# Patient Record
Sex: FEMALE | Race: BLACK OR AFRICAN AMERICAN | NOT HISPANIC OR LATINO | Employment: FULL TIME | URBAN - METROPOLITAN AREA
[De-identification: names, ages, dates, MRNs, and addresses within clinical notes are randomized per-mention and may not be internally consistent; named-entity substitution may affect disease eponyms.]

---

## 2017-01-13 ENCOUNTER — ALLSCRIPTS OFFICE VISIT (OUTPATIENT)
Dept: OTHER | Facility: OTHER | Age: 22
End: 2017-01-13

## 2017-01-17 ENCOUNTER — GENERIC CONVERSION - ENCOUNTER (OUTPATIENT)
Dept: OTHER | Facility: OTHER | Age: 22
End: 2017-01-17

## 2017-01-20 ENCOUNTER — HOSPITAL ENCOUNTER (EMERGENCY)
Facility: HOSPITAL | Age: 22
Discharge: HOME | End: 2017-01-21
Attending: EMERGENCY MEDICINE

## 2017-01-20 VITALS
HEART RATE: 74 BPM | DIASTOLIC BLOOD PRESSURE: 58 MMHG | BODY MASS INDEX: 21.35 KG/M2 | TEMPERATURE: 98.8 F | OXYGEN SATURATION: 100 % | WEIGHT: 136 LBS | HEIGHT: 67 IN | SYSTOLIC BLOOD PRESSURE: 118 MMHG | RESPIRATION RATE: 16 BRPM

## 2017-01-20 DIAGNOSIS — R10.2 VAGINAL PAIN: Primary | ICD-10-CM

## 2017-01-20 LAB
BILIRUB UR QL STRIP: NEGATIVE
CLARITY UR: CLEAR
COLOR UR: YELLOW
COLOR, POC: YELLOW
GLUCOSE UR STRIP-MCNC: NEGATIVE MG/DL
HCG UR QL: NEGATIVE
HGB UR QL STRIP.AUTO: NEGATIVE
KETONES UR STRIP-MCNC: NEGATIVE MG/DL
LEUKOCYTE ESTERASE UR QL STRIP: NEGATIVE
NITRITE UR QL STRIP: NEGATIVE
PH UR STRIP.AUTO: 6 [PH] (ref 4.5–8)
PROT UR STRIP-MCNC: NEGATIVE MG/DL
SP GR UR STRIP.AUTO: >=1.03 (ref 1–1.03)
UROBILINOGEN UR QL STRIP.AUTO: 1 E.U./DL

## 2017-01-20 PROCEDURE — 81025 URINE PREGNANCY TEST: CPT | Performed by: EMERGENCY MEDICINE

## 2017-01-20 PROCEDURE — 81003 URINALYSIS AUTO W/O SCOPE: CPT

## 2017-01-20 PROCEDURE — 81002 URINALYSIS NONAUTO W/O SCOPE: CPT | Performed by: EMERGENCY MEDICINE

## 2017-01-20 RX ORDER — ACYCLOVIR 200 MG/1
400 CAPSULE ORAL 3 TIMES DAILY
Qty: 60 CAPSULE | Refills: 0 | Status: SHIPPED | OUTPATIENT
Start: 2017-01-20 | End: 2018-08-27 | Stop reason: HOSPADM

## 2017-01-20 RX ORDER — LIDOCAINE 40 MG/G
CREAM TOPICAL ONCE
Status: COMPLETED | OUTPATIENT
Start: 2017-01-20 | End: 2017-01-20

## 2017-01-20 RX ORDER — FERROUS SULFATE 325(65) MG
325 TABLET ORAL
COMMUNITY
End: 2021-01-20 | Stop reason: ALTCHOICE

## 2017-01-20 RX ADMIN — LIDOCAINE 1 APPLICATION: 40 CREAM TOPICAL at 21:30

## 2017-01-21 PROCEDURE — 99284 EMERGENCY DEPT VISIT MOD MDM: CPT

## 2017-01-21 RX ORDER — NAPROXEN 500 MG/1
500 TABLET ORAL 2 TIMES DAILY WITH MEALS
Qty: 14 TABLET | Refills: 0 | Status: SHIPPED | OUTPATIENT
Start: 2017-01-21 | End: 2018-08-27 | Stop reason: HOSPADM

## 2017-01-21 RX ORDER — LIDOCAINE 40 MG/G
1 CREAM TOPICAL 2 TIMES DAILY
Qty: 30 G | Refills: 0 | Status: SHIPPED | OUTPATIENT
Start: 2017-01-21 | End: 2018-08-27 | Stop reason: HOSPADM

## 2017-03-08 ENCOUNTER — ALLSCRIPTS OFFICE VISIT (OUTPATIENT)
Dept: OTHER | Facility: OTHER | Age: 22
End: 2017-03-08

## 2017-03-08 DIAGNOSIS — R53.83 OTHER FATIGUE: ICD-10-CM

## 2017-03-08 DIAGNOSIS — D64.9 ANEMIA: ICD-10-CM

## 2017-03-10 ENCOUNTER — TRANSCRIBE ORDERS (OUTPATIENT)
Dept: ADMINISTRATIVE | Facility: HOSPITAL | Age: 22
End: 2017-03-10

## 2017-03-10 DIAGNOSIS — R53.81 OTHER MALAISE AND FATIGUE: Primary | ICD-10-CM

## 2017-03-10 DIAGNOSIS — R53.83 OTHER MALAISE AND FATIGUE: Primary | ICD-10-CM

## 2017-03-10 DIAGNOSIS — R06.83 SNORING: ICD-10-CM

## 2018-01-12 VITALS
HEART RATE: 72 BPM | WEIGHT: 133.38 LBS | SYSTOLIC BLOOD PRESSURE: 110 MMHG | RESPIRATION RATE: 16 BRPM | TEMPERATURE: 98.2 F | HEIGHT: 68 IN | BODY MASS INDEX: 20.21 KG/M2 | DIASTOLIC BLOOD PRESSURE: 56 MMHG

## 2018-01-12 VITALS
WEIGHT: 135 LBS | DIASTOLIC BLOOD PRESSURE: 58 MMHG | TEMPERATURE: 98.7 F | HEIGHT: 65 IN | HEART RATE: 60 BPM | SYSTOLIC BLOOD PRESSURE: 102 MMHG | BODY MASS INDEX: 22.49 KG/M2 | RESPIRATION RATE: 16 BRPM

## 2018-01-12 NOTE — PROGRESS NOTES
Assessment    1  Fatigue (780 79) (R53 83)   2  Anemia (285 9) (D64 9)   3  Snores (786 09) (R06 83)    Plan  Anemia    · (1) IRON SATURATION %, TIBC; Status:Active; Requested for:08Mar2017;    · (1) IRON; Status:Active; Requested for:08Mar2017; Anemia, Fatigue    · (1) CBC/PLT/DIFF; Status:Active; Requested for:08Mar2017;    · (1) COMPREHENSIVE METABOLIC PANEL; Status:Active; Requested for:08Mar2017;    · (1) TSH WITH FT4 REFLEX; Status:Active; Requested for:08Mar2017;   Fatigue, Snores    · *Polysomnography, Sleep Study, Diagnostic; Status:Need Information - Financial  Authorization; Requested for:08Mar2017;   there any other medical conditions or medications that would explain these      symptoms? : No  is the sleep disturbance affecting the patient's ability to function? : moderate  History/Symptoms: : snore  Study Only or Consult : Sleep Study and Consult and F/U with Sleep Specialist  Health Maintenance    · *VB-Depression Screening; Status:Complete;   Done: [id number] 04:34PM    Discussion/Summary    Will check labs ASAP  Will do sleep study  Will call with results  Possible side effects of new medications were reviewed with the patient/guardian today  The treatment plan was reviewed with the patient/guardian  The patient/guardian understands and agrees with the treatment plan      Chief Complaint  patient is feeling fatigue      History of Present Illness  HPI: Pt is here by herself  c/o fatigue for 2 months  Feels very tired during the day  Hard to focus  Sleep 8 hours at night  Snore at night  Hx of anemia  2 years ago Hg 6, got Iron IV and blood transfusion at that time  Was on depo but she did not like it  LMP 3/1/2017, last 4 days  Not bleeding heavy  She takes iron pills during menstrual period  Sometimes lightheaded  Sometimes palpitation  Denies fever, cough, SOB, CP, n/v/abd pain/diarrhea  Denies depression or anxiety  Smoke 1-2 cig per day for 5 years     Social alcohol  Denies drugs  Review of Systems    Constitutional: No fever, no chills, feels well, no tiredness, no recent weight gain or loss  Cardiovascular: no complaints of slow or fast heart rate, no chest pain, no palpitations, no leg claudication or lower extremity edema  Respiratory: no complaints of shortness of breath, no wheezing, no dyspnea on exertion, no orthopnea or PND  Gastrointestinal: no complaints of abdominal pain, no constipation, no nausea or diarrhea, no vomiting, no bloody stools  Musculoskeletal: no complaints of arthralgia, no myalgia, no joint swelling or stiffness, no limb pain or swelling  Active Problems    1  Acute sinus infection (461 9) (J01 90)   2  Anemia (285 9) (D64 9)   3  Contraceptives (V25 02)   4  Deviated nasal septum (470) (J34 2)   5  Menorrhagia (626 2) (N92 0)   6  Urinary frequency (788 41) (R35 0)    Past Medical History    1  History of syphilis (V12 09) (Z86 19)    Family History  Mother    1  Family history of Asthma (V17 5)   2  Family history of Atherosclerosis (V17 49)   3  Family history of Hypertension (V17 49)   4  Family history of Vasovagal Syncope  Maternal Grandmother    5  Family history of Diabetes Mellitus (V18 0)   6  Family history of Hypertension (V17 49)  Maternal Grandfather    7  Family history of Stroke Syndrome (V17 1)    Social History    · Current every day smoker (305 1) (F17 200)   · Social alcohol use (Z78 9)    Surgical History    1  Contraceptives (V25 02)   2  History of Umbilical Hernia Repair    Current Meds   1  Amoxicillin-Pot Clavulanate 875-125 MG Oral Tablet; TAKE 1 TABLET EVERY 12   HOURS DAILY; Therapy: 94VFT3424 to ([facility]:[id number])  Requested for: 01LEL3102; Last   Rx:13Jan2017 Ordered   2  Ferrous Sulfate 325 (65 Fe) MG Oral Tablet; Take 1 tab  Daily; Therapy: 23SUK6668 to (Last Rx:13Jan2017)  Requested for: 35YEL5193 Ordered   3   Promethazine-DM 6 25-15 MG/5ML Oral Syrup; TAKE 5 ML EVERY 4 TO 6 HOURS AS   NEEDED FOR COUGH; Therapy: 16ACD2407 to (Last Rx:13Jan2017)  Requested for: 21WAI3739 Ordered    Allergies    1  No Known Drug Allergies    Vitals   Recorded: 04JWU2750 04:15PM   Temperature 98 7 F   Heart Rate 60   Respiration 16   Systolic 153   Diastolic 58   Height 5 ft 5 in   Weight 135 lb    BMI Calculated 22 47   BSA Calculated 1 67     Physical Exam    Constitutional   General appearance: No acute distress, well appearing and well nourished  Ears, Nose, Mouth, and Throat   External inspection of ears and nose: Normal     Otoscopic examination: Tympanic membranes translucent with normal light reflex  Canals patent without erythema  Nasal mucosa, septum, and turbinates: Normal without edema or erythema  Oropharynx: Normal with no erythema, edema, exudate or lesions  Pulmonary   Respiratory effort: No increased work of breathing or signs of respiratory distress  Auscultation of lungs: Clear to auscultation  Cardiovascular   Auscultation of heart: Normal rate and rhythm, normal S1 and S2, without murmurs  Examination of extremities for edema and/or varicosities: Normal     Carotid pulses: Normal     Abdomen   Abdomen: Non-tender, no masses  Liver and spleen: No hepatomegaly or splenomegaly  Lymphatic   Palpation of lymph nodes in neck: No lymphadenopathy  Musculoskeletal   Gait and station: Normal          Signatures   Electronically signed by :  Baldev Singleton MD; Mar  8 2017  4:35PM EST                       (Author)

## 2018-01-14 NOTE — MISCELLANEOUS
Provider Comments  Provider Comments:   Patient had an appt today 01/17/16 and did not show up  A call was made to pt letting her know of the missed appt  Pt stated that she decided to changed doctor  Signatures   Electronically signed by :  JAKY Siegel; Jan 17 2017 12:01PM EST                       (Author)

## 2018-02-23 ENCOUNTER — TELEPHONE (OUTPATIENT)
Dept: PSYCHIATRY | Facility: CLINIC | Age: 23
End: 2018-02-23

## 2018-08-21 ENCOUNTER — APPOINTMENT (EMERGENCY)
Dept: RADIOLOGY | Facility: HOSPITAL | Age: 23
End: 2018-08-21
Payer: COMMERCIAL

## 2018-08-21 ENCOUNTER — HOSPITAL ENCOUNTER (OUTPATIENT)
Facility: HOSPITAL | Age: 23
Setting detail: OBSERVATION
Discharge: HOME | End: 2018-08-22
Attending: SURGERY | Admitting: SURGERY
Payer: COMMERCIAL

## 2018-08-21 DIAGNOSIS — S00.81XA ABRASION OF FACE, INITIAL ENCOUNTER: ICD-10-CM

## 2018-08-21 DIAGNOSIS — S02.2XXA CLOSED FRACTURE OF NASAL BONE, INITIAL ENCOUNTER: ICD-10-CM

## 2018-08-21 DIAGNOSIS — D64.9 ANEMIA, UNSPECIFIED TYPE: Primary | ICD-10-CM

## 2018-08-21 LAB
BASE EXCESS BLDA CALC-SCNC: -4 MMOL/L (ref -2–3)
BASOPHILS # BLD AUTO: 0.1 THOUSANDS/ΜL (ref 0–0.1)
BASOPHILS NFR BLD AUTO: 1 % (ref 0–1)
CA-I BLD-SCNC: 1.1 MMOL/L (ref 1.12–1.32)
EOSINOPHIL # BLD AUTO: 0.17 THOUSAND/ΜL (ref 0–0.61)
EOSINOPHIL NFR BLD AUTO: 2 % (ref 0–6)
ERYTHROCYTE [DISTWIDTH] IN BLOOD BY AUTOMATED COUNT: 23.2 % (ref 11.6–15.1)
GLUCOSE SERPL-MCNC: 112 MG/DL (ref 65–140)
HCO3 BLDA-SCNC: 20.3 MMOL/L (ref 24–30)
HCT VFR BLD AUTO: 24.8 % (ref 34.8–46.1)
HCT VFR BLD CALC: 27 % (ref 34.8–46.1)
HGB BLD-MCNC: 6.1 G/DL (ref 11.5–15.4)
HGB BLDA-MCNC: 9.2 G/DL (ref 11.5–15.4)
IMM GRANULOCYTES # BLD AUTO: 0.05 THOUSAND/UL (ref 0–0.2)
IMM GRANULOCYTES NFR BLD AUTO: 1 % (ref 0–2)
LYMPHOCYTES # BLD AUTO: 2.73 THOUSANDS/ΜL (ref 0.6–4.47)
LYMPHOCYTES NFR BLD AUTO: 31 % (ref 14–44)
MCH RBC QN AUTO: 15.8 PG (ref 26.8–34.3)
MCHC RBC AUTO-ENTMCNC: 24.6 G/DL (ref 31.4–37.4)
MCV RBC AUTO: 64 FL (ref 82–98)
MONOCYTES # BLD AUTO: 1.03 THOUSAND/ΜL (ref 0.17–1.22)
MONOCYTES NFR BLD AUTO: 12 % (ref 4–12)
NEUTROPHILS # BLD AUTO: 4.73 THOUSANDS/ΜL (ref 1.85–7.62)
NEUTS SEG NFR BLD AUTO: 53 % (ref 43–75)
NRBC BLD AUTO-RTO: 0 /100 WBCS
PCO2 BLD: 21 MMOL/L (ref 21–32)
PCO2 BLD: 34.8 MM HG (ref 42–50)
PH BLD: 7.38 [PH] (ref 7.3–7.4)
PLATELET # BLD AUTO: 747 THOUSANDS/UL (ref 149–390)
PMV BLD AUTO: 10.7 FL (ref 8.9–12.7)
PO2 BLD: 27 MM HG (ref 35–45)
POTASSIUM BLD-SCNC: 3.5 MMOL/L (ref 3.5–5.3)
RBC # BLD AUTO: 3.87 MILLION/UL (ref 3.81–5.12)
SAO2 % BLD FROM PO2: 50 % (ref 95–98)
SODIUM BLD-SCNC: 144 MMOL/L (ref 136–145)
SPECIMEN SOURCE: ABNORMAL
WBC # BLD AUTO: 8.81 THOUSAND/UL (ref 4.31–10.16)

## 2018-08-21 PROCEDURE — 84295 ASSAY OF SERUM SODIUM: CPT

## 2018-08-21 PROCEDURE — 85014 HEMATOCRIT: CPT

## 2018-08-21 PROCEDURE — 99219 PR INITIAL OBSERVATION CARE/DAY 50 MINUTES: CPT | Performed by: SURGERY

## 2018-08-21 PROCEDURE — 70486 CT MAXILLOFACIAL W/O DYE: CPT

## 2018-08-21 PROCEDURE — 82947 ASSAY GLUCOSE BLOOD QUANT: CPT

## 2018-08-21 PROCEDURE — 70450 CT HEAD/BRAIN W/O DYE: CPT

## 2018-08-21 PROCEDURE — 73564 X-RAY EXAM KNEE 4 OR MORE: CPT

## 2018-08-21 PROCEDURE — 85025 COMPLETE CBC W/AUTO DIFF WBC: CPT | Performed by: SURGERY

## 2018-08-21 PROCEDURE — 85730 THROMBOPLASTIN TIME PARTIAL: CPT | Performed by: SURGERY

## 2018-08-21 PROCEDURE — 80048 BASIC METABOLIC PNL TOTAL CA: CPT | Performed by: SURGERY

## 2018-08-21 PROCEDURE — 36415 COLL VENOUS BLD VENIPUNCTURE: CPT | Performed by: ORTHOPAEDIC SURGERY

## 2018-08-21 PROCEDURE — 85610 PROTHROMBIN TIME: CPT | Performed by: SURGERY

## 2018-08-21 PROCEDURE — 82330 ASSAY OF CALCIUM: CPT

## 2018-08-21 PROCEDURE — 84132 ASSAY OF SERUM POTASSIUM: CPT

## 2018-08-21 PROCEDURE — 82803 BLOOD GASES ANY COMBINATION: CPT

## 2018-08-21 PROCEDURE — 72125 CT NECK SPINE W/O DYE: CPT

## 2018-08-21 PROCEDURE — 74177 CT ABD & PELVIS W/CONTRAST: CPT

## 2018-08-21 RX ORDER — OXYCODONE HYDROCHLORIDE 5 MG/1
5 TABLET ORAL EVERY 4 HOURS PRN
Status: DISCONTINUED | OUTPATIENT
Start: 2018-08-21 | End: 2018-08-22 | Stop reason: HOSPADM

## 2018-08-21 RX ORDER — ACETAMINOPHEN 325 MG/1
650 TABLET ORAL EVERY 6 HOURS PRN
Status: DISCONTINUED | OUTPATIENT
Start: 2018-08-21 | End: 2018-08-22 | Stop reason: HOSPADM

## 2018-08-21 RX ADMIN — IOHEXOL 100 ML: 350 INJECTION, SOLUTION INTRAVENOUS at 23:44

## 2018-08-22 ENCOUNTER — TELEPHONE (OUTPATIENT)
Dept: HEMATOLOGY ONCOLOGY | Facility: CLINIC | Age: 23
End: 2018-08-22

## 2018-08-22 VITALS
BODY MASS INDEX: 22.94 KG/M2 | OXYGEN SATURATION: 98 % | HEART RATE: 64 BPM | HEIGHT: 67 IN | TEMPERATURE: 98 F | WEIGHT: 146.16 LBS | SYSTOLIC BLOOD PRESSURE: 127 MMHG | DIASTOLIC BLOOD PRESSURE: 56 MMHG | RESPIRATION RATE: 18 BRPM

## 2018-08-22 DIAGNOSIS — D64.9 ANEMIA, UNSPECIFIED TYPE: Primary | ICD-10-CM

## 2018-08-22 PROBLEM — S00.81XA FACIAL ABRASION: Status: ACTIVE | Noted: 2018-08-22

## 2018-08-22 PROBLEM — M25.562 LEFT KNEE PAIN: Status: ACTIVE | Noted: 2018-08-22

## 2018-08-22 PROBLEM — D50.9 IRON DEFICIENCY ANEMIA: Status: ACTIVE | Noted: 2018-08-22

## 2018-08-22 PROBLEM — S02.2XXA NASAL FRACTURE: Status: ACTIVE | Noted: 2018-08-22

## 2018-08-22 LAB
ABO GROUP BLD: NORMAL
AMPHETAMINES SERPL QL SCN: NEGATIVE
ANION GAP SERPL CALCULATED.3IONS-SCNC: 12 MMOL/L (ref 4–13)
APTT PPP: 30 SECONDS (ref 24–36)
BACTERIA UR QL AUTO: NORMAL /HPF
BARBITURATES UR QL: NEGATIVE
BENZODIAZ UR QL: NEGATIVE
BILIRUB UR QL STRIP: NEGATIVE
BLD GP AB SCN SERPL QL: NEGATIVE
BUN SERPL-MCNC: 8 MG/DL (ref 5–25)
CALCIUM SERPL-MCNC: 8.4 MG/DL (ref 8.3–10.1)
CHLORIDE SERPL-SCNC: 109 MMOL/L (ref 100–108)
CLARITY UR: CLEAR
CO2 SERPL-SCNC: 20 MMOL/L (ref 21–32)
COCAINE UR QL: NEGATIVE
COLOR UR: YELLOW
CREAT SERPL-MCNC: 0.88 MG/DL (ref 0.6–1.3)
ERYTHROCYTE [DISTWIDTH] IN BLOOD BY AUTOMATED COUNT: 22.9 % (ref 11.6–15.1)
FERRITIN SERPL-MCNC: <1 NG/ML (ref 8–388)
GFR SERPL CREATININE-BSD FRML MDRD: 106 ML/MIN/1.73SQ M
GLUCOSE SERPL-MCNC: 113 MG/DL (ref 65–140)
GLUCOSE UR STRIP-MCNC: NEGATIVE MG/DL
HCT VFR BLD AUTO: 23 % (ref 34.8–46.1)
HCT VFR BLD AUTO: 24 % (ref 34.8–46.1)
HCT VFR BLD AUTO: 31.3 % (ref 34.8–46.1)
HGB BLD-MCNC: 5.6 G/DL (ref 11.5–15.4)
HGB BLD-MCNC: 5.9 G/DL (ref 11.5–15.4)
HGB BLD-MCNC: 8.7 G/DL (ref 11.5–15.4)
HGB UR QL STRIP.AUTO: ABNORMAL
INR PPP: 1.16 (ref 0.86–1.17)
IRON SATN MFR SERPL: <1 %
IRON SERPL-MCNC: <5 UG/DL (ref 50–170)
KETONES UR STRIP-MCNC: NEGATIVE MG/DL
LEUKOCYTE ESTERASE UR QL STRIP: NEGATIVE
MCH RBC QN AUTO: 15.7 PG (ref 26.8–34.3)
MCHC RBC AUTO-ENTMCNC: 24.3 G/DL (ref 31.4–37.4)
MCV RBC AUTO: 64 FL (ref 82–98)
METHADONE UR QL: NEGATIVE
NITRITE UR QL STRIP: NEGATIVE
NON-SQ EPI CELLS URNS QL MICRO: NORMAL /HPF
OPIATES UR QL SCN: NEGATIVE
PCP UR QL: NEGATIVE
PH UR STRIP.AUTO: 6.5 [PH] (ref 4.5–8)
PLATELET # BLD AUTO: 572 THOUSANDS/UL (ref 149–390)
PMV BLD AUTO: 10.4 FL (ref 8.9–12.7)
POTASSIUM SERPL-SCNC: 3.4 MMOL/L (ref 3.5–5.3)
PROT UR STRIP-MCNC: NEGATIVE MG/DL
PROTHROMBIN TIME: 14.9 SECONDS (ref 11.8–14.2)
RBC # BLD AUTO: 3.57 MILLION/UL (ref 3.81–5.12)
RBC #/AREA URNS AUTO: NORMAL /HPF
RH BLD: POSITIVE
SODIUM SERPL-SCNC: 141 MMOL/L (ref 136–145)
SP GR UR STRIP.AUTO: <=1.005 (ref 1–1.03)
SPECIMEN EXPIRATION DATE: NORMAL
THC UR QL: NEGATIVE
TIBC SERPL-MCNC: 439 UG/DL (ref 250–450)
UROBILINOGEN UR QL STRIP.AUTO: 0.2 E.U./DL
WBC # BLD AUTO: 7.11 THOUSAND/UL (ref 4.31–10.16)
WBC #/AREA URNS AUTO: NORMAL /HPF

## 2018-08-22 PROCEDURE — 90471 IMMUNIZATION ADMIN: CPT

## 2018-08-22 PROCEDURE — 36415 COLL VENOUS BLD VENIPUNCTURE: CPT | Performed by: ORTHOPAEDIC SURGERY

## 2018-08-22 PROCEDURE — 86920 COMPATIBILITY TEST SPIN: CPT

## 2018-08-22 PROCEDURE — 86850 RBC ANTIBODY SCREEN: CPT | Performed by: SURGERY

## 2018-08-22 PROCEDURE — 85027 COMPLETE CBC AUTOMATED: CPT | Performed by: ORTHOPAEDIC SURGERY

## 2018-08-22 PROCEDURE — 90715 TDAP VACCINE 7 YRS/> IM: CPT | Performed by: ORTHOPAEDIC SURGERY

## 2018-08-22 PROCEDURE — 83550 IRON BINDING TEST: CPT | Performed by: INTERNAL MEDICINE

## 2018-08-22 PROCEDURE — P9021 RED BLOOD CELLS UNIT: HCPCS

## 2018-08-22 PROCEDURE — 86900 BLOOD TYPING SEROLOGIC ABO: CPT | Performed by: SURGERY

## 2018-08-22 PROCEDURE — 80307 DRUG TEST PRSMV CHEM ANLYZR: CPT | Performed by: ORTHOPAEDIC SURGERY

## 2018-08-22 PROCEDURE — 83540 ASSAY OF IRON: CPT | Performed by: INTERNAL MEDICINE

## 2018-08-22 PROCEDURE — 85018 HEMOGLOBIN: CPT | Performed by: SURGERY

## 2018-08-22 PROCEDURE — 85014 HEMATOCRIT: CPT | Performed by: ORTHOPAEDIC SURGERY

## 2018-08-22 PROCEDURE — 99243 OFF/OP CNSLTJ NEW/EST LOW 30: CPT | Performed by: INTERNAL MEDICINE

## 2018-08-22 PROCEDURE — 82728 ASSAY OF FERRITIN: CPT | Performed by: INTERNAL MEDICINE

## 2018-08-22 PROCEDURE — 85014 HEMATOCRIT: CPT | Performed by: SURGERY

## 2018-08-22 PROCEDURE — 99217 PR OBSERVATION CARE DISCHARGE MANAGEMENT: CPT | Performed by: EMERGENCY MEDICINE

## 2018-08-22 PROCEDURE — 85018 HEMOGLOBIN: CPT | Performed by: ORTHOPAEDIC SURGERY

## 2018-08-22 PROCEDURE — 81001 URINALYSIS AUTO W/SCOPE: CPT

## 2018-08-22 PROCEDURE — 86901 BLOOD TYPING SEROLOGIC RH(D): CPT | Performed by: SURGERY

## 2018-08-22 PROCEDURE — 99285 EMERGENCY DEPT VISIT HI MDM: CPT

## 2018-08-22 RX ORDER — OXYCODONE HYDROCHLORIDE 5 MG/1
5 TABLET ORAL EVERY 4 HOURS PRN
Qty: 5 TABLET | Refills: 0 | Status: SHIPPED | OUTPATIENT
Start: 2018-08-22 | End: 2018-08-27 | Stop reason: HOSPADM

## 2018-08-22 RX ORDER — GINSENG 100 MG
1 CAPSULE ORAL 2 TIMES DAILY
Status: DISCONTINUED | OUTPATIENT
Start: 2018-08-22 | End: 2018-08-22 | Stop reason: HOSPADM

## 2018-08-22 RX ADMIN — BACITRACIN ZINC 1 SMALL APPLICATION: 500 OINTMENT TOPICAL at 09:28

## 2018-08-22 RX ADMIN — OXYCODONE HYDROCHLORIDE 5 MG: 5 TABLET ORAL at 14:30

## 2018-08-22 RX ADMIN — ACETAMINOPHEN 650 MG: 325 TABLET, FILM COATED ORAL at 06:42

## 2018-08-22 RX ADMIN — TETANUS TOXOID, REDUCED DIPHTHERIA TOXOID AND ACELLULAR PERTUSSIS VACCINE, ADSORBED 0.5 ML: 5; 2.5; 8; 8; 2.5 SUSPENSION INTRAMUSCULAR at 00:31

## 2018-08-22 RX ADMIN — OXYCODONE HYDROCHLORIDE 5 MG: 5 TABLET ORAL at 09:28

## 2018-08-22 RX ADMIN — BACITRACIN ZINC 1 SMALL APPLICATION: 500 OINTMENT TOPICAL at 19:24

## 2018-08-22 RX ADMIN — IRON SUCROSE 300 MG: 20 INJECTION, SOLUTION INTRAVENOUS at 10:48
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
## 2018-08-22 NOTE — DISCHARGE INSTRUCTIONS
Trauma Discharge Instructions    A Referral has been placed to the Oral Maxillary Facial Doctors on your behalf  - They should contact you to set up an appointment  - If you do not hear from them in the next two days please call there office on Friday to get an appointment  Follow up with our Trauma team is on an as needed basis only  Activity:  - You may resume activity as tolerated  Return to work:    - You are clear to return to work on discharge  Diet:    - You may resume your normal diet  Medications:  - You may resume all of your regular medications, including blood thinners and aspirin, after going home unless otherwise instructed  Please refer to your discharge medication list for further details  - You are encouraged to use non-narcotic pain medications  You may alternate Tylenol and Ibuprofen as instructed on the bottles  Additional Instructions:  - May shower daily and/or bathe normally  - If you have any questions or concerns after discharge please call the office   - Call office or return to ER if fever greater than 101, chills, persistent nausea/vomiting, and/or worsening/uncontrollable pain  Wound care instructions  Cleanse facial abrasions daily with gentle soap and water  Keep wounds moist with antibiotic ointment one to two times daily and as needed      Apply ice to face for swelling and pain as needed
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
## 2018-08-22 NOTE — ASSESSMENT & PLAN NOTE
- Known to patient and has been worked up in past  - Hgb 5 6 without signs of active bleeding   - Transfuse 2u PRBC today  - Hematology consult
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
## 2018-08-22 NOTE — PLAN OF CARE
Problem: DISCHARGE PLANNING  Goal: Discharge to home or other facility with appropriate resources  INTERVENTIONS:  - Identify barriers to discharge w/patient and caregiver  - Arrange for needed discharge resources and transportation as appropriate  - Identify discharge learning needs (meds, wound care, etc )  -     - Refer to Case Management Department for coordinating discharge planning if the patient needs post-hospital services based on physician/advanced practitioner order or complex needs related to functional status, cognitive ability, or social support system  Outcome: Progressing

## 2018-08-22 NOTE — PLAN OF CARE
Problem: SAFETY ADULT  Goal: Patient will remain free of falls  INTERVENTIONS:  - Assess patient frequently for physical needs  -  Identify cognitive and physical deficits and behaviors that affect risk of falls    -  [facility] fall precautions as indicated by assessment   - Educate patient/family on patient safety including physical limitations  - Instruct patient to call for assistance with activity based on assessment  - Modify environment to reduce risk of injury  - Consider OT/PT consult to assist with strengthening/mobility  Outcome: Progressing

## 2018-08-22 NOTE — TELEPHONE ENCOUNTER
Received call from Lan Ghotra in the hospital  This patient is ready to be discharged  She called to start the process of ordering this patient's outpatient Venofer  She confirmed the first dose of Venofer was already given in the hospital   Per Dr Curry Martin note:    "will initiate Venofer 300 mg IV daily x2 days otherwise the patient is being discharged today she will receive the 1st dose here as inpatient and will arrange for outpatient every week for total of 4 doses  3  I ordered iron studies to confirm iron deficiency anemia  4  Follow up as an outpatient in about 4-6 weeks with CBC and iron studies no need for additional workup at this time"    Please schedule the above and call patient with the schedule

## 2018-08-22 NOTE — TELEPHONE ENCOUNTER
PLEASE SET UP PATIENT WITH VENOFER 300 MG WEEKLY TIMES 4 AND F/U WITH DR Riya Nails THEREAFTER WITH CBC AND IRON STUDIES  THANKS
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
## 2018-08-22 NOTE — PROGRESS NOTES
Repeat HGB was 5 9  Repeat FAST exam was negative  Will admit for observation, repeat CBC in the AM, and Hematology consult        Blanche Blanco MD  Surgery, PGY-2

## 2018-08-23 ENCOUNTER — TRANSITIONAL CARE MANAGEMENT (OUTPATIENT)
Dept: FAMILY MEDICINE CLINIC | Facility: CLINIC | Age: 23
End: 2018-08-23

## 2018-08-23 LAB
ABO GROUP BLD BPU: NORMAL
ABO GROUP BLD BPU: NORMAL
BPU ID: NORMAL
BPU ID: NORMAL
CROSSMATCH: NORMAL
CROSSMATCH: NORMAL
UNIT DISPENSE STATUS: NORMAL
UNIT DISPENSE STATUS: NORMAL
UNIT PRODUCT CODE: NORMAL
UNIT PRODUCT CODE: NORMAL
UNIT RH: NORMAL
UNIT RH: NORMAL

## 2018-08-27 ENCOUNTER — OFFICE VISIT (OUTPATIENT)
Dept: FAMILY MEDICINE CLINIC | Facility: CLINIC | Age: 23
End: 2018-08-27
Payer: COMMERCIAL

## 2018-08-27 VITALS
BODY MASS INDEX: 21.63 KG/M2 | RESPIRATION RATE: 16 BRPM | HEIGHT: 67 IN | TEMPERATURE: 98.3 F | WEIGHT: 137.8 LBS | SYSTOLIC BLOOD PRESSURE: 108 MMHG | HEART RATE: 84 BPM | DIASTOLIC BLOOD PRESSURE: 72 MMHG

## 2018-08-27 DIAGNOSIS — Z09 HOSPITAL DISCHARGE FOLLOW-UP: Primary | ICD-10-CM

## 2018-08-27 DIAGNOSIS — T14.8XXA MUSCLE STRAIN: ICD-10-CM

## 2018-08-27 DIAGNOSIS — F32.A DEPRESSION, UNSPECIFIED DEPRESSION TYPE: ICD-10-CM
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
## 2018-08-27 DIAGNOSIS — M25.562 ACUTE PAIN OF LEFT KNEE: ICD-10-CM

## 2018-08-27 DIAGNOSIS — D50.9 IRON DEFICIENCY ANEMIA, UNSPECIFIED IRON DEFICIENCY ANEMIA TYPE: ICD-10-CM

## 2018-08-27 PROBLEM — D64.9 ANEMIA: Status: ACTIVE | Noted: 2017-01-13

## 2018-08-27 PROBLEM — J34.2 DEVIATED NASAL SEPTUM: Status: ACTIVE | Noted: 2017-01-13

## 2018-08-27 PROBLEM — R53.83 FATIGUE: Status: ACTIVE | Noted: 2017-03-08

## 2018-08-27 PROBLEM — R06.83 SNORES: Status: ACTIVE | Noted: 2017-03-08

## 2018-08-27 PROCEDURE — 96127 BRIEF EMOTIONAL/BEHAV ASSMT: CPT | Performed by: FAMILY MEDICINE

## 2018-08-27 PROCEDURE — 99214 OFFICE O/P EST MOD 30 MIN: CPT | Performed by: FAMILY MEDICINE
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
## 2018-09-06 RX ORDER — SODIUM CHLORIDE 9 MG/ML
20 INJECTION, SOLUTION INTRAVENOUS ONCE
Status: COMPLETED | OUTPATIENT
Start: 2018-09-07 | End: 2018-09-07

## 2018-09-07 ENCOUNTER — HOSPITAL ENCOUNTER (OUTPATIENT)
Dept: INFUSION CENTER | Facility: HOSPITAL | Age: 23
Discharge: HOME | End: 2018-09-07
Payer: COMMERCIAL

## 2018-09-07 VITALS
BODY MASS INDEX: 22.21 KG/M2 | HEIGHT: 67 IN | HEART RATE: 59 BPM | WEIGHT: 141.54 LBS | RESPIRATION RATE: 16 BRPM | TEMPERATURE: 98.2 F | SYSTOLIC BLOOD PRESSURE: 122 MMHG | DIASTOLIC BLOOD PRESSURE: 56 MMHG

## 2018-09-07 PROCEDURE — 96365 THER/PROPH/DIAG IV INF INIT: CPT

## 2018-09-07 PROCEDURE — 96366 THER/PROPH/DIAG IV INF ADDON: CPT

## 2018-09-07 RX ADMIN — SODIUM CHLORIDE 20 ML/HR: 0.9 INJECTION, SOLUTION INTRAVENOUS at 13:59

## 2018-09-07 RX ADMIN — IRON SUCROSE 300 MG: 20 INJECTION, SOLUTION INTRAVENOUS at 14:02

## 2018-09-13 RX ORDER — SODIUM CHLORIDE 9 MG/ML
20 INJECTION, SOLUTION INTRAVENOUS ONCE
Status: COMPLETED | OUTPATIENT
Start: 2018-09-15 | End: 2018-09-15

## 2018-09-15 ENCOUNTER — HOSPITAL ENCOUNTER (OUTPATIENT)
Dept: INFUSION CENTER | Facility: HOSPITAL | Age: 23
Discharge: HOME | End: 2018-09-15
Payer: COMMERCIAL

## 2018-09-15 VITALS
HEART RATE: 62 BPM | DIASTOLIC BLOOD PRESSURE: 62 MMHG | SYSTOLIC BLOOD PRESSURE: 98 MMHG | TEMPERATURE: 98.4 F | RESPIRATION RATE: 18 BRPM

## 2018-09-15 PROCEDURE — 96365 THER/PROPH/DIAG IV INF INIT: CPT

## 2018-09-15 PROCEDURE — 96366 THER/PROPH/DIAG IV INF ADDON: CPT

## 2018-09-15 RX ADMIN — IRON SUCROSE 300 MG: 20 INJECTION, SOLUTION INTRAVENOUS at 10:30

## 2018-09-15 RX ADMIN — SODIUM CHLORIDE 20 ML/HR: 0.9 INJECTION, SOLUTION INTRAVENOUS at 10:30

## 2018-09-15 NOTE — PLAN OF CARE
Problem: Potential for Falls  Goal: Patient will remain free of falls  INTERVENTIONS:  - Assess patient frequently for physical needs  -  Identify cognitive and physical deficits and behaviors that affect risk of falls    -  [place] fall precautions as indicated by assessment   - Educate patient/family on patient safety including physical limitations  - Instruct patient to call for assistance with activity based on assessment  - Modify environment to reduce risk of injury  - Consider OT/PT consult to assist with strengthening/mobility   Outcome: Progressing

## 2018-09-20 ENCOUNTER — HOSPITAL ENCOUNTER (OUTPATIENT)
Dept: INFUSION CENTER | Facility: HOSPITAL | Age: 23
Discharge: HOME | End: 2018-09-20

## 2018-09-20 RX ORDER — SODIUM CHLORIDE 9 MG/ML
20 INJECTION, SOLUTION INTRAVENOUS ONCE
Status: COMPLETED | OUTPATIENT
Start: 2018-09-22 | End: 2018-09-22

## 2018-09-22 ENCOUNTER — HOSPITAL ENCOUNTER (OUTPATIENT)
Dept: INFUSION CENTER | Facility: HOSPITAL | Age: 23
Discharge: HOME | End: 2018-09-22
Payer: COMMERCIAL

## 2018-09-22 VITALS
SYSTOLIC BLOOD PRESSURE: 119 MMHG | RESPIRATION RATE: 16 BRPM | DIASTOLIC BLOOD PRESSURE: 62 MMHG | HEART RATE: 62 BPM | TEMPERATURE: 97.6 F

## 2018-09-22 PROCEDURE — 96365 THER/PROPH/DIAG IV INF INIT: CPT

## 2018-09-22 PROCEDURE — 96366 THER/PROPH/DIAG IV INF ADDON: CPT

## 2018-09-22 RX ADMIN — SODIUM CHLORIDE 20 ML/HR: 0.9 INJECTION, SOLUTION INTRAVENOUS at 10:20

## 2018-09-22 RX ADMIN — IRON SUCROSE 300 MG: 20 INJECTION, SOLUTION INTRAVENOUS at 10:20

## 2018-09-22 NOTE — PLAN OF CARE
Problem: Potential for Falls  Goal: Patient will remain free of falls  INTERVENTIONS:  - Assess patient frequently for physical needs  -  Identify cognitive and physical deficits and behaviors that affect risk of falls    -  [place] fall precautions as indicated by assessment   - Educate patient/family on patient safety including physical limitations  - Instruct patient to call for assistance with activity based on assessment  - Modify environment to reduce risk of injury  - Consider OT/PT consult to assist with strengthening/mobility   Outcome: Progressing

## 2018-09-27 RX ORDER — SODIUM CHLORIDE 9 MG/ML
20 INJECTION, SOLUTION INTRAVENOUS ONCE
Status: COMPLETED | OUTPATIENT
Start: 2018-09-29 | End: 2018-09-29

## 2018-09-29 ENCOUNTER — HOSPITAL ENCOUNTER (OUTPATIENT)
Dept: INFUSION CENTER | Facility: HOSPITAL | Age: 23
Discharge: HOME | End: 2018-09-29
Payer: COMMERCIAL

## 2018-09-29 VITALS
RESPIRATION RATE: 16 BRPM | HEART RATE: 73 BPM | SYSTOLIC BLOOD PRESSURE: 118 MMHG | DIASTOLIC BLOOD PRESSURE: 64 MMHG | TEMPERATURE: 97.6 F

## 2018-09-29 PROCEDURE — 96366 THER/PROPH/DIAG IV INF ADDON: CPT

## 2018-09-29 PROCEDURE — 96365 THER/PROPH/DIAG IV INF INIT: CPT

## 2018-09-29 RX ADMIN — SODIUM CHLORIDE 20 ML/HR: 0.9 INJECTION, SOLUTION INTRAVENOUS at 10:59

## 2018-09-29 RX ADMIN — IRON SUCROSE 300 MG: 20 INJECTION, SOLUTION INTRAVENOUS at 10:59

## 2018-10-25 ENCOUNTER — TELEPHONE (OUTPATIENT)
Dept: HEMATOLOGY ONCOLOGY | Facility: CLINIC | Age: 23
End: 2018-10-25

## 2018-10-26 NOTE — TELEPHONE ENCOUNTER
I attempted to reach pt, I don't recall ever speaking to this pt regarding her not having insurance  But I did leave a message asking her to call back at [phone] due to her no showing her apt today, and acknowledging her call yesterday

## 2021-01-15 ENCOUNTER — HOSPITAL ENCOUNTER (EMERGENCY)
Facility: HOSPITAL | Age: 26
Discharge: HOME | End: 2021-01-16
Attending: EMERGENCY MEDICINE | Admitting: EMERGENCY MEDICINE

## 2021-01-15 DIAGNOSIS — D62 ACUTE BLOOD LOSS ANEMIA: Primary | ICD-10-CM

## 2021-01-15 DIAGNOSIS — N93.8 DUB (DYSFUNCTIONAL UTERINE BLEEDING): ICD-10-CM

## 2021-01-15 LAB
ABO GROUP BLD: NORMAL
ANION GAP SERPL CALCULATED.3IONS-SCNC: 5 MMOL/L (ref 4–13)
BASOPHILS # BLD AUTO: 0.07 THOUSANDS/ΜL (ref 0–0.1)
BASOPHILS NFR BLD AUTO: 1 % (ref 0–1)
BILIRUB UR QL STRIP: NEGATIVE
BLD GP AB SCN SERPL QL: NEGATIVE
BUN SERPL-MCNC: 8 MG/DL (ref 5–25)
CALCIUM SERPL-MCNC: 8.9 MG/DL (ref 8.3–10.1)
CHLORIDE SERPL-SCNC: 109 MMOL/L (ref 100–108)
CLARITY UR: CLEAR
CO2 SERPL-SCNC: 24 MMOL/L (ref 21–32)
COLOR UR: YELLOW
CREAT SERPL-MCNC: 0.79 MG/DL (ref 0.6–1.3)
EOSINOPHIL # BLD AUTO: 0.08 THOUSAND/ΜL (ref 0–0.61)
EOSINOPHIL NFR BLD AUTO: 1 % (ref 0–6)
ERYTHROCYTE [DISTWIDTH] IN BLOOD BY AUTOMATED COUNT: 28.6 % (ref 11.6–15.1)
EXT PREG TEST URINE: NEGATIVE
EXT. CONTROL ED NAV: NORMAL
GFR SERPL CREATININE-BSD FRML MDRD: 119 ML/MIN/1.73SQ M
GLUCOSE SERPL-MCNC: 86 MG/DL (ref 65–140)
GLUCOSE UR STRIP-MCNC: NEGATIVE MG/DL
HCT VFR BLD AUTO: 22.1 % (ref 34.8–46.1)
HCT VFR BLD AUTO: 23.8 % (ref 34.8–46.1)
HGB BLD-MCNC: 5.4 G/DL (ref 11.5–15.4)
HGB BLD-MCNC: 6.3 G/DL (ref 11.5–15.4)
HGB UR QL STRIP.AUTO: NEGATIVE
IMM GRANULOCYTES # BLD AUTO: 0.02 THOUSAND/UL (ref 0–0.2)
IMM GRANULOCYTES NFR BLD AUTO: 0 % (ref 0–2)
KETONES UR STRIP-MCNC: NEGATIVE MG/DL
LEUKOCYTE ESTERASE UR QL STRIP: NEGATIVE
LYMPHOCYTES # BLD AUTO: 1.92 THOUSANDS/ΜL (ref 0.6–4.47)
LYMPHOCYTES NFR BLD AUTO: 23 % (ref 14–44)
MCH RBC QN AUTO: 16.2 PG (ref 26.8–34.3)
MCHC RBC AUTO-ENTMCNC: 24.4 G/DL (ref 31.4–37.4)
MCV RBC AUTO: 66 FL (ref 82–98)
MONOCYTES # BLD AUTO: 0.87 THOUSAND/ΜL (ref 0.17–1.22)
MONOCYTES NFR BLD AUTO: 10 % (ref 4–12)
NEUTROPHILS # BLD AUTO: 5.43 THOUSANDS/ΜL (ref 1.85–7.62)
NEUTS SEG NFR BLD AUTO: 65 % (ref 43–75)
NITRITE UR QL STRIP: NEGATIVE
NRBC BLD AUTO-RTO: 0 /100 WBCS
PH UR STRIP.AUTO: 6 [PH] (ref 4.5–8)
PLATELET # BLD AUTO: 825 THOUSANDS/UL (ref 149–390)
PMV BLD AUTO: 10.1 FL (ref 8.9–12.7)
POTASSIUM SERPL-SCNC: 3.7 MMOL/L (ref 3.5–5.3)
PROT UR STRIP-MCNC: NEGATIVE MG/DL
RBC # BLD AUTO: 3.33 MILLION/UL (ref 3.81–5.12)
RH BLD: POSITIVE
SODIUM SERPL-SCNC: 138 MMOL/L (ref 136–145)
SP GR UR STRIP.AUTO: 1.01 (ref 1–1.03)
SPECIMEN EXPIRATION DATE: NORMAL
UROBILINOGEN UR QL STRIP.AUTO: 0.2 E.U./DL
WBC # BLD AUTO: 8.39 THOUSAND/UL (ref 4.31–10.16)

## 2021-01-15 PROCEDURE — 99284 EMERGENCY DEPT VISIT MOD MDM: CPT

## 2021-01-15 PROCEDURE — 86850 RBC ANTIBODY SCREEN: CPT | Performed by: EMERGENCY MEDICINE

## 2021-01-15 PROCEDURE — 85025 COMPLETE CBC W/AUTO DIFF WBC: CPT | Performed by: EMERGENCY MEDICINE

## 2021-01-15 PROCEDURE — 81025 URINE PREGNANCY TEST: CPT | Performed by: EMERGENCY MEDICINE

## 2021-01-15 PROCEDURE — 85018 HEMOGLOBIN: CPT | Performed by: EMERGENCY MEDICINE

## 2021-01-15 PROCEDURE — 80048 BASIC METABOLIC PNL TOTAL CA: CPT | Performed by: EMERGENCY MEDICINE

## 2021-01-15 PROCEDURE — 99285 EMERGENCY DEPT VISIT HI MDM: CPT | Performed by: EMERGENCY MEDICINE

## 2021-01-15 PROCEDURE — 85014 HEMATOCRIT: CPT | Performed by: EMERGENCY MEDICINE

## 2021-01-15 PROCEDURE — 93005 ELECTROCARDIOGRAM TRACING: CPT

## 2021-01-15 PROCEDURE — 86920 COMPATIBILITY TEST SPIN: CPT

## 2021-01-15 PROCEDURE — P9016 RBC LEUKOCYTES REDUCED: HCPCS

## 2021-01-15 PROCEDURE — 86900 BLOOD TYPING SEROLOGIC ABO: CPT | Performed by: EMERGENCY MEDICINE

## 2021-01-15 PROCEDURE — 36430 TRANSFUSION BLD/BLD COMPNT: CPT

## 2021-01-15 PROCEDURE — 36415 COLL VENOUS BLD VENIPUNCTURE: CPT | Performed by: EMERGENCY MEDICINE

## 2021-01-15 PROCEDURE — 81003 URINALYSIS AUTO W/O SCOPE: CPT

## 2021-01-15 PROCEDURE — 86901 BLOOD TYPING SEROLOGIC RH(D): CPT | Performed by: EMERGENCY MEDICINE

## 2021-01-15 NOTE — ED ATTENDING ATTESTATION
1/15/2021  I, Niurka Salazar MD, saw and evaluated the patient  I have discussed the patient with the resident/non-physician practitioner and agree with the resident's/non-physician practitioner's findings, Plan of Care, and MDM as documented in the resident's/non-physician practitioner's note, except where noted  All available labs and Radiology studies were reviewed  I was present for key portions of any procedure(s) performed by the resident/non-physician practitioner and I was immediately available to provide assistance  At this point I agree with the current assessment done in the Emergency Department    I have conducted an independent evaluation of this patient a history and physical is as follows:  Pt concerned she has anemia      On bcp   Has had daily bleeding vaginal for 3 weeks but this has stopped   fatique and weakness  No cp no dysuria   Exam nad  patient's conjunctiva are pink she does not appear pale  Lungs clear heart rrr no m abd soft nt pos bs   Extremities normal  Impression fatigue rule out anemia  ED Course         Critical Care Time  Procedures

## 2021-01-15 NOTE — Clinical Note
Lia Octjordan was seen and treated in our emergency department on 1/15/2021  Diagnosis: Ryan Vinson  may return to school on return date  She may return on this date: 01/17/2021         If you have any questions or concerns, please don't hesitate to call        Ben Reyes,     ______________________________           _______________          _______________  Hospital Representative                              Date                                Time

## 2021-01-15 NOTE — ED PROVIDER NOTES
History  Chief Complaint   Patient presents with    Vaginal Bleeding     pt c/o vaginal bleeding since her last period at the end of december  denies pain, denies known pregnancy  Patient is a 42-year-old female with a past medical history of anemia presents for evaluation of fatigue in the setting of prolonged vaginal bleeding  Patient states that she had been on her menstrual period for the past 3 weeks due to recent medication change  Patient states that today, she started to feel fatigued and lightheaded, and noticed that she was short of breath after walking up stairs  Patient states these are similar to symptoms she had when she was anemic in the past   Patient had required iron transfusions, but does not believe that she required blood transfusions previously  Patient states that she is no longer bleeding now  Denies any chest pain  Denies any recent fevers, chills, chest pain, abdominal pain, nausea or vomiting  History provided by:  Patient   used: No        Prior to Admission Medications   Prescriptions Last Dose Informant Patient Reported? Taking?    Cyanocobalamin (B-12 PO) 1/15/2021 at Unknown time Self Yes Yes   Sig: Take 1 tablet by mouth daily   Multiple Vitamins-Minerals (MULTIVITAL PO) 1/15/2021 at Unknown time Self Yes Yes   Sig: Take 1 tablet by mouth daily   ferrous sulfate 325 (65 Fe) mg tablet 1/15/2021 at Unknown time Self Yes Yes   Sig: Take 325 mg by mouth daily with breakfast      Facility-Administered Medications: None       Past Medical History:   Diagnosis Date    Anemia     Herpes     Syphilis     resolved       Past Surgical History:   Procedure Laterality Date    HERNIA REPAIR      HERNIA REPAIR  2838    umbilical hernia repair - resolved       Family History   Problem Relation Age of Onset    Asthma Mother     Coronary artery disease Mother     Hypertension Mother     Diabetes Maternal Grandmother     Hypertension Maternal Grandmother  Stroke Maternal Grandfather         syndrome     I have reviewed and agree with the history as documented  E-Cigarette/Vaping     E-Cigarette/Vaping Substances     Social History     Tobacco Use    Smoking status: Former Smoker     Packs/day: 0 25     Types: Cigarettes    Smokeless tobacco: Never Used   Substance Use Topics    Alcohol use: Yes     Comment: social    Drug use: No        Review of Systems   Constitutional: Positive for fatigue  Negative for chills and fever  HENT: Negative for congestion  Eyes: Negative for visual disturbance  Respiratory: Negative for cough and shortness of breath  Cardiovascular: Negative for chest pain  Gastrointestinal: Negative for abdominal pain, nausea and vomiting  Genitourinary: Positive for menstrual problem  Negative for dysuria and hematuria  Musculoskeletal: Negative for myalgias  Skin: Negative for wound  Neurological: Positive for light-headedness  All other systems reviewed and are negative  Physical Exam  ED Triage Vitals [01/15/21 1734]   Temperature Pulse Respirations Blood Pressure SpO2   98 4 °F (36 9 °C) 68 16 121/56 100 %      Temp Source Heart Rate Source Patient Position - Orthostatic VS BP Location FiO2 (%)   Oral Monitor Lying Left arm --      Pain Score       5             Orthostatic Vital Signs  Vitals:    01/15/21 2221 01/15/21 2336 01/15/21 2351 01/16/21 0105   BP: 142/58 142/58 136/60 140/61   Pulse: 63 68 70 71   Patient Position - Orthostatic VS: Lying Lying  Sitting       Physical Exam  Vitals signs and nursing note reviewed  Constitutional:       General: She is not in acute distress  Appearance: She is not ill-appearing or toxic-appearing  HENT:      Head: Normocephalic and atraumatic  Mouth/Throat:      Lips: Pink  Mouth: Mucous membranes are moist       Comments: Gums are pink  Eyes:      General: Lids are normal  Vision grossly intact        Extraocular Movements: Extraocular movements intact  Comments: Conjunctiva are pink, not pale   Neck:      Musculoskeletal: Full passive range of motion without pain and neck supple  Cardiovascular:      Rate and Rhythm: Normal rate and regular rhythm  Heart sounds: S1 normal and S2 normal    Pulmonary:      Effort: Pulmonary effort is normal  No respiratory distress  Breath sounds: Normal breath sounds  No wheezing, rhonchi or rales  Abdominal:      Palpations: Abdomen is soft  Tenderness: There is no abdominal tenderness  Skin:     General: Skin is warm and dry  Coloration: Skin is not pale  Neurological:      General: No focal deficit present  Mental Status: She is alert and oriented to person, place, and time           ED Medications  Medications - No data to display    Diagnostic Studies  Results Reviewed     Procedure Component Value Units Date/Time    POCT pregnancy, urine [[id number]]  (Normal) Resulted: 01/15/21 1810    Lab Status: Final result Updated: 01/15/21 2341     EXT PREG TEST UR (Ref: Negative) negative     Control valid    Hemoglobin and hematocrit, blood [[id number]]  (Abnormal) Collected: 01/15/21 2244    Lab Status: Final result Specimen: Blood from Arm, Right Updated: 01/15/21 2310     Hemoglobin 6 3 g/dL      Hematocrit 23 8 %     CBC and differential [[id number]]  (Abnormal) Collected: 01/15/21 1802    Lab Status: Final result Specimen: Blood from Arm, Left Updated: 01/15/21 1829     WBC 8 39 Thousand/uL      RBC 3 33 Million/uL      Hemoglobin 5 4 g/dL      Hematocrit 22 1 %      MCV 66 fL      MCH 16 2 pg      MCHC 24 4 g/dL      RDW 28 6 %      MPV 10 1 fL      Platelets 286 Thousands/uL      nRBC 0 /100 WBCs      Neutrophils Relative 65 %      Immat GRANS % 0 %      Lymphocytes Relative 23 %      Monocytes Relative 10 %      Eosinophils Relative 1 %      Basophils Relative 1 %      Neutrophils Absolute 5 43 Thousands/µL      Immature Grans Absolute 0 02 Thousand/uL      Lymphocytes Absolute 1 92 Thousands/µL      Monocytes Absolute 0 87 Thousand/µL      Eosinophils Absolute 0 08 Thousand/µL      Basophils Absolute 0 07 Thousands/µL     Basic metabolic panel [[id number]]  (Abnormal) Collected: 01/15/21 1802    Lab Status: Final result Specimen: Blood from Arm, Left Updated: 01/15/21 1824     Sodium 138 mmol/L      Potassium 3 7 mmol/L      Chloride 109 mmol/L      CO2 24 mmol/L      ANION GAP 5 mmol/L      BUN 8 mg/dL      Creatinine 0 79 mg/dL      Glucose 86 mg/dL      Calcium 8 9 mg/dL      eGFR 119 ml/min/1 73sq m     Narrative:      [facility] guidelines for Chronic Kidney Disease (CKD):     Stage 1 with normal or high GFR (GFR > 90 mL/min/1 73 square meters)    Stage 2 Mild CKD (GFR = 60-89 mL/min/1 73 square meters)    Stage 3A Moderate CKD (GFR = 45-59 mL/min/1 73 square meters)    Stage 3B Moderate CKD (GFR = 30-44 mL/min/1 73 square meters)    Stage 4 Severe CKD (GFR = 15-29 mL/min/1 73 square meters)    Stage 5 End Stage CKD (GFR <15 mL/min/1 73 square meters)  Note: GFR calculation is accurate only with a steady state creatinine    Urine Macroscopic, POC [[id number]] Collected: 01/15/21 1809    Lab Status: Final result Specimen: Urine Updated: 01/15/21 1810     Color, UA Yellow     Clarity, UA Clear     pH, UA 6 0     Leukocytes, UA Negative     Nitrite, UA Negative     Protein, UA Negative mg/dl      Glucose, UA Negative mg/dl      Ketones, UA Negative mg/dl      Urobilinogen, UA 0 2 E U /dl      Bilirubin, UA Negative     Blood, UA Negative     Specific Gravity, UA 1 010    Narrative:      [facility] RESULT                 No orders to display         Procedures  Procedures      ED Course  ED Course as of Jose Daniel 17 2205   Fri Jose Daniel 15, 2021   1830 Hemoglobin(!!): 5 4   1843 2 units of PRBC's ordered at this time      2314 Will transfuse second unit   Hemoglobin(!!): 6 3   2352 PREGNANCY TEST URINE: negative                             SBIRT 20yo+      Most Recent Value SBIRT (23 yo +)   In order to provide better care to our patients, we are screening all of our patients for alcohol and drug use  Would it be okay to ask you these screening questions? Yes Filed at: 01/16/2021 0141   Initial Alcohol Screen: US AUDIT-C    1  How often do you have a drink containing alcohol?  0 Filed at: 01/16/2021 0141   2  How many drinks containing alcohol do you have on a typical day you are drinking? 0 Filed at: 01/16/2021 0141   3b  FEMALE Any Age, or MALE 65+: How often do you have 4 or more drinks on one occassion? 0 Filed at: 01/16/2021 0141   Audit-C Score  0 Filed at: 01/16/2021 0141   JENNIE: How many times in the past year have you    Used an illegal drug or used a prescription medication for non-medical reasons? Never Filed at: 01/16/2021 0141                MDM  Number of Diagnoses or Management Options  Acute blood loss anemia: new and requires workup  DUB (dysfunctional uterine bleeding): established and improving  Diagnosis management comments: 58-year-old female presents for evaluation of fatigue and lightheadedness after a heavy menstrual period lasting 3 weeks  Patient states that she is no longer bleeding at this time, but did notice today that she felt lightheaded, fatigued, and was short of breath with walking up stairs  Patient states these are similar symptoms to what she experienced last time she was anemic, so she would like to be evaluated  On exam, patient's vitals are stable, and mucous membranes are still pink  Patient does not appear pale at this time  Will evaluate patient with CBC, BMP, and urine pregnancy given prolonged recent uterine bleeding  Patient's initial CBC revealed a hemoglobin of 5 4  At this time, type and screen was sent and 2 units of blood were ordered  Patient's BMP within normal limits, urine pregnancy test was negative  No blood seen on UA  Patient was given 1 unit of packed red blood cells, and an H&H was re-evaluated    Patient is still anemic with a hemoglobin of 6 3  Second unit of packed red blood cells was administered at this time  Of note, patient did complain of burning with infusion of 1st unit of blood  Infusion was stopped, IVs were assessed  Second IV was placed, patient still continued to complain of burning  This infusion was again stopped  A 3rd IV was started in the patient's other arm, patient no longer complaining of burning  Patient showed no signs of anaphylaxis or other concerning symptoms of transfusion reaction  Patient tolerated the infusion without difficulty  Patient feels much better after receiving 2 units of packed red blood cells  Patient was discharged in stable condition with instructions to follow-up with her PCP  Patient given strict ED return precautions  Amount and/or Complexity of Data Reviewed  Clinical lab tests: ordered and reviewed    Patient Progress  Patient progress: stable      Disposition  Final diagnoses:   DUB (dysfunctional uterine bleeding)   Acute blood loss anemia     Time reflects when diagnosis was documented in both MDM as applicable and the Disposition within this note     Time User Action Codes Description Comment    1/15/2021 11:58 PM Ayanna Fairy Add [D64 9] Anemia     1/16/2021  1:06 AM Dimas ClipperDonovanswick Remove [D64 9] Anemia     1/16/2021  1:06 AM Ayanna Fairy Add [N93 8] DUB (dysfunctional uterine bleeding)     1/16/2021  1:07 AM Dimas Clipper Donovan [place] Add [D62] Acute blood loss anemia     1/16/2021  1:07 AM Ayanna Fairy Modify [N93 8] DUB (dysfunctional uterine bleeding)     1/16/2021  1:07 AM Ayanna Fairy Modify [D62] Acute blood loss anemia       ED Disposition     ED Disposition Condition Date/Time Comment    Discharge Stable Sat Jan 16, 2021  1:18 AM [address] discharge to home/self care              Follow-up Information     Follow up With Specialties Details Why Contact Info Additional Information    Hannah Orozco MD Family Medicine Schedule an appointment as soon as possible for a visit in 1 week As needed Norma [address] [facility]  [phone]       [facility] [place] [facility] Emergency [facility] Emergency Medicine Go to  If symptoms worsen [address]  [address] [facility] 64 [facility] Emergency Department, [address], [place], [place], [facility] 108          Discharge Medication List as of 1/16/2021  1:18 AM      CONTINUE these medications which have NOT CHANGED    Details   Cyanocobalamin (B-12 PO) Take 1 tablet by mouth daily, Historical Med      ferrous sulfate 325 (65 Fe) mg tablet Take 325 mg by mouth daily with breakfast, Historical Med      Multiple Vitamins-Minerals (MULTIVITAL PO) Take 1 tablet by mouth daily, Historical Med           No discharge procedures on file  PDMP Review     None           ED Provider  Attending physically available and evaluated [facility]  I managed the patient along with the ED Attending      Electronically Signed by         Cm Cavazos DO  01/17/21 0415

## 2021-01-16 VITALS
DIASTOLIC BLOOD PRESSURE: 61 MMHG | OXYGEN SATURATION: 100 % | RESPIRATION RATE: 16 BRPM | WEIGHT: 145 LBS | TEMPERATURE: 98 F | HEIGHT: 67 IN | BODY MASS INDEX: 22.76 KG/M2 | HEART RATE: 71 BPM | SYSTOLIC BLOOD PRESSURE: 140 MMHG

## 2021-01-20 ENCOUNTER — OFFICE VISIT (OUTPATIENT)
Dept: FAMILY MEDICINE CLINIC | Facility: CLINIC | Age: 26
End: 2021-01-20

## 2021-01-20 VITALS
SYSTOLIC BLOOD PRESSURE: 110 MMHG | WEIGHT: 143 LBS | HEIGHT: 67 IN | RESPIRATION RATE: 14 BRPM | TEMPERATURE: 97.5 F | DIASTOLIC BLOOD PRESSURE: 68 MMHG | BODY MASS INDEX: 22.44 KG/M2

## 2021-01-20 DIAGNOSIS — D64.9 ANEMIA, UNSPECIFIED TYPE: Primary | ICD-10-CM

## 2021-01-20 DIAGNOSIS — N92.0 MENORRHAGIA WITH REGULAR CYCLE: ICD-10-CM

## 2021-01-20 LAB
ATRIAL RATE: 69 BPM
P AXIS: 67 DEGREES
PR INTERVAL: 152 MS
QRS AXIS: 73 DEGREES
QRSD INTERVAL: 98 MS
QT INTERVAL: 408 MS
QTC INTERVAL: 437 MS
T WAVE AXIS: 57 DEGREES
VENTRICULAR RATE: 69 BPM

## 2021-01-20 PROCEDURE — 99214 OFFICE O/P EST MOD 30 MIN: CPT | Performed by: FAMILY MEDICINE

## 2021-01-20 PROCEDURE — 93010 ELECTROCARDIOGRAM REPORT: CPT | Performed by: INTERNAL MEDICINE

## 2021-01-20 NOTE — PROGRESS NOTES
Chief Complaint   Patient presents with    Follow-up     1/15/2021 Acute blood loss anemia      Health Maintenance   Topic Date Due    HIV Screening  04/03/2009    Annual Physical  04/03/2012    Cervical Cancer Screening  04/03/2015    Influenza Vaccine (1) 09/01/2020    Depression Screening PHQ  01/20/2022    BMI: Adult  01/20/2022    DTaP,Tdap,and Td Vaccines (7 - Td) 08/22/2028    HIB Vaccine  Completed    Hepatitis B Vaccine  Completed    IPV Vaccine  Completed    HPV Vaccine  Completed    Pneumococcal Vaccine: Pediatrics (0 to 5 Years) and At-Risk Patients (6 to 59 Years)  Aged Out    Hepatitis A Vaccine  Aged Out    Meningococcal ACWY Vaccine  Aged Out      Assessment/Plan:    Menorrhagia  Refer to [employer]  Iron deficiency anemia  Refer to hem/onc  Diagnoses and all orders for this visit:    Anemia, unspecified type  Comments:  Reviewed ER record  Orders:  -     CBC; Future  -     Ambulatory referral to Hematology / Oncology; Future    Menorrhagia with regular cycle  -     Ambulatory referral to Obstetrics / Gynecology; Future  -     CBC; Future    Other orders  -     Cancel: HIV 1/2 Antigen/Antibody (4th Generation) w Reflex [facility]; Future  -     Cancel: Ambulatory referral to Obstetrics / Gynecology; Future  -     Cancel: influenza vaccine, quadrivalent, 0 5 mL, preservative-free, for adult and pediatric patients 6 mos+ (AFLURIA, FLUARIX, FLULAVAL, FLUZONE)          Subjective:      Patient ID: Raven Hernandez is a 31 y o  female  HPI    Pt is here by herself  Pt states she got heavy period for a while  She has no insurance, so she got birth control pill from an frank  Her last period last for 2 weeks  Bleeding stopped on 1/4/2020  She felt tired and dizzy  Went to ER on 1/15/2021  Hg was 5 4  Got 2u PRBC  Pt felt better and discharged home  Pt had hx of iron deficiency anemia and cannot tolerate oral iron  She had IV iron before          The following portions of the patient's history were reviewed and updated as appropriate: allergies, current medications, past family history, past medical history, past social history, past surgical history and problem list     Review of Systems   Constitutional: Negative for appetite change, chills and fever  HENT: Negative for congestion, ear pain, sinus pain and sore throat  Eyes: Negative for discharge and itching  Respiratory: Negative for apnea, cough, chest tightness, shortness of breath and wheezing  Cardiovascular: Negative for chest pain, palpitations and leg swelling  Gastrointestinal: Negative for abdominal pain, anal bleeding, constipation, diarrhea, nausea and vomiting  Endocrine: Negative for cold intolerance, heat intolerance and polyuria  Genitourinary: Positive for menstrual problem  Negative for difficulty urinating and dysuria  Musculoskeletal: Negative for arthralgias, back pain and myalgias  Skin: Negative for rash  Neurological: Negative for dizziness and headaches  Psychiatric/Behavioral: Negative for agitation  Objective:      /68 (BP Location: Left arm, Patient Position: Sitting, Cuff Size: Adult)   Temp 97 5 °F (36 4 °C) (Tympanic)   Resp 14   Ht 5' 7" (1 702 m)   Wt 64 9 kg (143 lb)   LMP 12/24/2020   BMI 22 40 kg/m²          Physical Exam  Constitutional:       General: She is not in acute distress  Appearance: She is well-developed  HENT:      Head: Normocephalic  Eyes:      General:         Right eye: No discharge  Left eye: No discharge  Conjunctiva/sclera: Conjunctivae normal    Neck:      Musculoskeletal: Normal range of motion  Thyroid: No thyromegaly  Cardiovascular:      Rate and Rhythm: Normal rate and regular rhythm  Heart sounds: Normal heart sounds  No murmur  No friction rub  No gallop  Pulmonary:      Effort: Pulmonary effort is normal  No respiratory distress  Breath sounds: Normal breath sounds   No wheezing or rales  Chest:      Chest wall: No tenderness  Abdominal:      General: Bowel sounds are normal  There is no distension  Palpations: Abdomen is soft  There is no mass  Tenderness: There is no abdominal tenderness  There is no guarding or rebound  Musculoskeletal: Normal range of motion  General: No tenderness or deformity  Lymphadenopathy:      Cervical: No cervical adenopathy  Neurological:      Mental Status: She is alert

## 2021-01-21 ENCOUNTER — TELEPHONE (OUTPATIENT)
Dept: HEMATOLOGY ONCOLOGY | Facility: CLINIC | Age: 26
End: 2021-01-21

## 2021-01-21 NOTE — TELEPHONE ENCOUNTER
New Patient Encounter    New Patient Intake Form   Patient Details:  Tracey Simms  1995  [id number]    Background Information:  [address] starts by opening a telephone encounter and gathering the following information   Who is calling to schedule? If not self, relationship to patient? self   Referring Provider Nelsy Prince   What is the diagnosis? fe def anemia   Is this diagnosis confirmed? Yes   When was the diagnosis? 2018   Is there a confirmed diagnosis from a biopsy/tissue reviewed by pathology? Were outside slides requested? Is patient aware of diagnosis? Yes   Is there a personal history and what kind? Is there a family history and what kind? Reason for visit? History Of   Have you had any imaging or labs done? If so: when, where? yes  [facility]   Are records in [facility]? yes   If patient has a prior history of breast cancer were old records obtained? NA   Was the patient told to bring a disk? no   Does the patient smoke or Vape? no   If yes, how many packs or cartridges per day? Scheduling Information:   Preferred [place]:  [place]     Are there any dates/time the patient cannot be seen? Miscellaneous: pt was seen by Dr Sola Morales as inpt 2018  Multiple cancellations and no show to f/u at that time  Pt was in ER on 1/15/21 had blood transfusion, she will have f/u CBC prior to this appt  PT states she is working with [place] [facility]   After completing the above information, please route to Financial Counselor and the appropriate Nurse Navigator for review

## 2021-02-07 NOTE — PROGRESS NOTES
Assessment/Plan:  Reviewed notes from ER visit and PCP visit   Complete previously ordered CBC  Thyroid study ordered  Follow up with hematologist as ordered  Pelvic ultrasound ordered  Negative UPT today  Start nuva ring today  Insert today and remove in 3 weeks for one week  Repeat  Return to office in 3 months for birth control follow up and annual visit  Diagnoses and all orders for this visit:    Encounter for initial prescription of vaginal ring hormonal contraceptive  -     etonogestrel-ethinyl estradiol (NUVARING) 0 12-0 015 MG/24HR vaginal ring; Insert vaginally and leave in place for 3 consecutive weeks, then remove for 1 week  Possible exposure to STD  -     Chlamydia/GC amplified DNA by PCR  -     Cancel: Chlamydia/GC amplified DNA by PCR    Irregular menses  -     US pelvis complete non OB; Future  -     TSH, 3rd generation; Future  -     T4, free; Future  -     POCT urine HCG  -     etonogestrel-ethinyl estradiol (NUVARING) 0 12-0 015 MG/24HR vaginal ring; Insert vaginally and leave in place for 3 consecutive weeks, then remove for 1 week  Subjective:      Patient ID: Neena Vernon is a 31 y o  female  Neena Vernon is a 31 y o  female who is here today for a problem visit   Patient had a 3 week menses that ended 1/4/2021  She then bled x 2 days around 1/28/ 2021 with light flow  She was evaluated in the ER on 1/15/2021  UPT negative  Initial H/H was 5 4 / 22 1  She was transfused with 1 unit of PRBC's with H/H increasing to 6 3/23  8  Transfused with second unit of PRBC's  Improvement of symptoms and discharged home  Followed with PCP Dr Alex Jiménez on 1/21/2021  She was referred here and to heme/onc  Due for repeat CBC  Typically monthly menses x 5-7 days with mod to heavy flow  Birth control pill started 6/2020 x 6 months  Irregular menses while on RAFFI  Complaint with RAFFI   Discontinued on own as she felt her menses "was lasting too long" and wanted a trial off to see what would happen  Orlando Treadwell is sexually active with male partner of 4 months  "Sometimes" condom use  Pregnancy would be acceptable  Normal vaginal discharge  [facility] STI testing  Last coitus 1/29/2021  The following portions of the patient's history were reviewed and updated as appropriate: allergies, current medications, past family history, past medical history, past social history, past surgical history and problem list     Review of Systems   Constitutional: Negative  Eyes: Negative for visual disturbance  Respiratory: Negative for chest tightness and shortness of breath  Cardiovascular: Negative for chest pain, palpitations and leg swelling  Gastrointestinal: Negative for abdominal pain, constipation, diarrhea, nausea and vomiting  Genitourinary: Positive for menstrual problem  Negative for difficulty urinating, dyspareunia, dysuria, frequency, pelvic pain, vaginal bleeding, vaginal discharge and vaginal pain  Musculoskeletal: Negative for back pain  Skin: Negative  Neurological: Negative for weakness, light-headedness and headaches  Hematological: Does not bruise/bleed easily  Psychiatric/Behavioral: Negative  All other systems reviewed and are negative  Objective:      /80 (BP Location: Left arm, Patient Position: Sitting, Cuff Size: Standard)   Ht 5' 7" (1 702 m)   Wt 64 9 kg (143 lb)   LMP 12/19/2020 Comment: lasted until January 8th  BMI 22 40 kg/m²          Physical Exam  Vitals signs and nursing note reviewed  Exam conducted with a chaperone present  Constitutional:       Appearance: Normal appearance  She is well-developed  Genitourinary:     General: Normal vulva  Labia:         Right: No rash, tenderness, lesion or injury  Left: No rash, tenderness, lesion or injury  Urethra: No prolapse, urethral pain, urethral swelling or urethral lesion  Vagina: No signs of injury and foreign body   Bleeding (light) present  No vaginal discharge, erythema, tenderness, lesions or prolapsed vaginal walls  Cervix: No cervical motion tenderness, discharge or friability  Uterus: Normal        Adnexa: Right adnexa normal and left adnexa normal         Right: No mass, tenderness or fullness  Left: No mass, tenderness or fullness  Rectum: No external hemorrhoid  Musculoskeletal: Normal range of motion  Lymphadenopathy:      Lower Body: No right inguinal adenopathy  No left inguinal adenopathy  Skin:     General: Skin is warm and dry  Neurological:      Mental Status: She is alert and oriented to person, place, and time

## 2021-02-08 ENCOUNTER — OFFICE VISIT (OUTPATIENT)
Dept: OBGYN CLINIC | Facility: CLINIC | Age: 26
End: 2021-02-08

## 2021-02-08 VITALS
SYSTOLIC BLOOD PRESSURE: 128 MMHG | DIASTOLIC BLOOD PRESSURE: 80 MMHG | HEIGHT: 67 IN | WEIGHT: 143 LBS | BODY MASS INDEX: 22.44 KG/M2

## 2021-02-08 DIAGNOSIS — N92.6 IRREGULAR MENSES: ICD-10-CM

## 2021-02-08 DIAGNOSIS — Z20.2 POSSIBLE EXPOSURE TO STD: ICD-10-CM

## 2021-02-08 DIAGNOSIS — Z30.015 ENCOUNTER FOR INITIAL PRESCRIPTION OF VAGINAL RING HORMONAL CONTRACEPTIVE: Primary | ICD-10-CM

## 2021-02-08 LAB — SL AMB POCT URINE HCG: NORMAL

## 2021-02-08 PROCEDURE — 81025 URINE PREGNANCY TEST: CPT | Performed by: NURSE PRACTITIONER

## 2021-02-08 PROCEDURE — 87491 CHLMYD TRACH DNA AMP PROBE: CPT | Performed by: NURSE PRACTITIONER

## 2021-02-08 PROCEDURE — 87591 N.GONORRHOEAE DNA AMP PROB: CPT | Performed by: NURSE PRACTITIONER

## 2021-02-08 PROCEDURE — 99203 OFFICE O/P NEW LOW 30 MIN: CPT | Performed by: NURSE PRACTITIONER

## 2021-02-08 RX ORDER — ETONOGESTREL AND ETHINYL ESTRADIOL 11.7; 2.7 MG/1; MG/1
INSERT, EXTENDED RELEASE VAGINAL
Qty: 1 EACH | Refills: 2 | Status: SHIPPED | OUTPATIENT
Start: 2021-02-08

## 2021-02-08 NOTE — PATIENT INSTRUCTIONS
Reviewed notes from ER visit and PCP visit   Complete previously ordered CBC  Thyroid study ordered  Follow up with hematologist as ordered  Pelvic ultrasound ordered  Negative UPT today  Start nuva ring today  Insert today and remove in 3 weeks for one week  Repeat  Return to office in 3 months for birth control follow up and annual visit

## 2021-02-09 ENCOUNTER — DOCUMENTATION (OUTPATIENT)
Dept: HEMATOLOGY ONCOLOGY | Facility: MEDICAL CENTER | Age: 26
End: 2021-02-09

## 2021-02-10 ENCOUNTER — OFFICE VISIT (OUTPATIENT)
Dept: HEMATOLOGY ONCOLOGY | Facility: CLINIC | Age: 26
End: 2021-02-10

## 2021-02-10 VITALS
BODY MASS INDEX: 22.66 KG/M2 | RESPIRATION RATE: 16 BRPM | HEIGHT: 67 IN | HEART RATE: 46 BPM | WEIGHT: 144.4 LBS | DIASTOLIC BLOOD PRESSURE: 72 MMHG | OXYGEN SATURATION: 99 % | SYSTOLIC BLOOD PRESSURE: 114 MMHG | TEMPERATURE: 98.6 F

## 2021-02-10 DIAGNOSIS — D64.9 ANEMIA, UNSPECIFIED TYPE: Primary | ICD-10-CM

## 2021-02-10 DIAGNOSIS — D50.9 IRON DEFICIENCY ANEMIA, UNSPECIFIED IRON DEFICIENCY ANEMIA TYPE: ICD-10-CM

## 2021-02-10 LAB
C TRACH DNA SPEC QL NAA+PROBE: NEGATIVE
N GONORRHOEA DNA SPEC QL NAA+PROBE: NEGATIVE

## 2021-02-10 PROCEDURE — 99244 OFF/OP CNSLTJ NEW/EST MOD 40: CPT | Performed by: PHYSICIAN ASSISTANT

## 2021-02-10 RX ORDER — SODIUM CHLORIDE 9 MG/ML
20 INJECTION, SOLUTION INTRAVENOUS ONCE
Status: CANCELLED | OUTPATIENT
Start: 2021-02-17
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
## 2021-02-22 ENCOUNTER — DOCUMENTATION (OUTPATIENT)
Dept: HEMATOLOGY ONCOLOGY | Facility: CLINIC | Age: 26
End: 2021-02-22

## 2021-02-22 ENCOUNTER — HOSPITAL ENCOUNTER (OUTPATIENT)
Dept: INFUSION CENTER | Facility: HOSPITAL | Age: 26
Discharge: HOME | End: 2021-02-22
Attending: INTERNAL MEDICINE

## 2021-02-22 NOTE — PROGRESS NOTES
recvd email from [employer] that pt has no ins  cked promise shows inactive called pt to see if she has ins got her voicemail left her a message to call me back

## 2021-02-22 NOTE — PROGRESS NOTES
Pt called me back & sd that she applied for ma  She submitted the last bit of info that they requested about 3 weeks ago   I told her about the plan we are par with & she thanked me for the info

## 2021-02-25 ENCOUNTER — HOSPITAL ENCOUNTER (OUTPATIENT)
Dept: INFUSION CENTER | Facility: HOSPITAL | Age: 26
Discharge: HOME | End: 2021-02-25
Attending: INTERNAL MEDICINE

## 2021-02-25 VITALS
OXYGEN SATURATION: 99 % | SYSTOLIC BLOOD PRESSURE: 110 MMHG | TEMPERATURE: 97.7 F | RESPIRATION RATE: 18 BRPM | HEART RATE: 71 BPM | DIASTOLIC BLOOD PRESSURE: 60 MMHG

## 2021-02-25 DIAGNOSIS — D50.9 IRON DEFICIENCY ANEMIA, UNSPECIFIED IRON DEFICIENCY ANEMIA TYPE: Primary | ICD-10-CM

## 2021-02-25 LAB
FERRITIN SERPL-MCNC: 3 NG/ML (ref 8–388)
IRON SATN MFR SERPL: 4 %
IRON SERPL-MCNC: 15 UG/DL (ref 50–170)
TIBC SERPL-MCNC: 410 UG/DL (ref 250–450)

## 2021-02-25 PROCEDURE — 82728 ASSAY OF FERRITIN: CPT

## 2021-02-25 PROCEDURE — 36415 COLL VENOUS BLD VENIPUNCTURE: CPT

## 2021-02-25 PROCEDURE — 96366 THER/PROPH/DIAG IV INF ADDON: CPT

## 2021-02-25 PROCEDURE — 83540 ASSAY OF IRON: CPT

## 2021-02-25 PROCEDURE — 83550 IRON BINDING TEST: CPT

## 2021-02-25 PROCEDURE — 96365 THER/PROPH/DIAG IV INF INIT: CPT

## 2021-02-25 RX ORDER — SODIUM CHLORIDE 9 MG/ML
20 INJECTION, SOLUTION INTRAVENOUS ONCE
Status: COMPLETED | OUTPATIENT
Start: 2021-02-25 | End: 2021-02-25

## 2021-02-25 RX ORDER — SODIUM CHLORIDE 9 MG/ML
20 INJECTION, SOLUTION INTRAVENOUS ONCE
Status: CANCELLED | OUTPATIENT
Start: 2021-03-01

## 2021-02-25 RX ADMIN — IRON SUCROSE 300 MG: 20 INJECTION, SOLUTION INTRAVENOUS at 08:59

## 2021-02-25 RX ADMIN — SODIUM CHLORIDE 20 ML/HR: 0.9 INJECTION, SOLUTION INTRAVENOUS at 08:59

## 2021-02-25 NOTE — PLAN OF CARE
Problem: Potential for Falls  Goal: Patient will remain free of falls  Description: INTERVENTIONS:  - Assess patient frequently for physical needs  -  Identify cognitive and physical deficits and behaviors that affect risk of falls    -  [place] fall precautions as indicated by assessment   - Educate patient/family on patient safety including physical limitations  - Instruct patient to call for assistance with activity based on assessment  - Modify environment to reduce risk of injury  - Consider OT/PT consult to assist with strengthening/mobility  Outcome: Progressing

## 2021-03-01 ENCOUNTER — HOSPITAL ENCOUNTER (OUTPATIENT)
Dept: INFUSION CENTER | Facility: HOSPITAL | Age: 26
Discharge: HOME | End: 2021-03-01
Attending: INTERNAL MEDICINE

## 2021-03-01 VITALS — HEART RATE: 61 BPM | SYSTOLIC BLOOD PRESSURE: 105 MMHG | RESPIRATION RATE: 18 BRPM | DIASTOLIC BLOOD PRESSURE: 48 MMHG

## 2021-03-01 DIAGNOSIS — D50.9 IRON DEFICIENCY ANEMIA, UNSPECIFIED IRON DEFICIENCY ANEMIA TYPE: Primary | ICD-10-CM

## 2021-03-01 PROCEDURE — 96365 THER/PROPH/DIAG IV INF INIT: CPT

## 2021-03-01 PROCEDURE — 96366 THER/PROPH/DIAG IV INF ADDON: CPT

## 2021-03-01 RX ORDER — SODIUM CHLORIDE 9 MG/ML
20 INJECTION, SOLUTION INTRAVENOUS ONCE
Status: CANCELLED | OUTPATIENT
Start: 2021-03-08

## 2021-03-01 RX ORDER — SODIUM CHLORIDE 9 MG/ML
20 INJECTION, SOLUTION INTRAVENOUS ONCE
Status: COMPLETED | OUTPATIENT
Start: 2021-03-01 | End: 2021-03-01

## 2021-03-01 RX ADMIN — SODIUM CHLORIDE 20 ML/HR: 0.9 INJECTION, SOLUTION INTRAVENOUS at 13:57

## 2021-03-01 RX ADMIN — IRON SUCROSE 300 MG: 20 INJECTION, SOLUTION INTRAVENOUS at 13:57

## 2021-03-01 NOTE — PLAN OF CARE
Problem: Potential for Falls  Goal: Patient will remain free of falls  Description: INTERVENTIONS:  - Assess patient frequently for physical needs  -  Identify cognitive and physical deficits and behaviors that affect risk of falls    -  [place] fall precautions as indicated by assessment   - Educate patient/family on patient safety including physical limitations  - Instruct patient to call for assistance with activity based on assessment  - Modify environment to reduce risk of injury  - Consider OT/PT consult to assist with strengthening/mobility  Outcome: Progressing

## 2021-03-08 ENCOUNTER — HOSPITAL ENCOUNTER (OUTPATIENT)
Dept: INFUSION CENTER | Facility: HOSPITAL | Age: 26
End: 2021-03-08
Attending: INTERNAL MEDICINE

## 2021-03-15 ENCOUNTER — HOSPITAL ENCOUNTER (OUTPATIENT)
Dept: INFUSION CENTER | Facility: HOSPITAL | Age: 26
Discharge: HOME | End: 2021-03-15
Attending: INTERNAL MEDICINE

## 2021-03-15 VITALS
RESPIRATION RATE: 18 BRPM | SYSTOLIC BLOOD PRESSURE: 117 MMHG | HEART RATE: 92 BPM | DIASTOLIC BLOOD PRESSURE: 66 MMHG | TEMPERATURE: 97.2 F

## 2021-03-15 DIAGNOSIS — D50.9 IRON DEFICIENCY ANEMIA, UNSPECIFIED IRON DEFICIENCY ANEMIA TYPE: Primary | ICD-10-CM

## 2021-03-15 PROCEDURE — 96366 THER/PROPH/DIAG IV INF ADDON: CPT

## 2021-03-15 PROCEDURE — 96365 THER/PROPH/DIAG IV INF INIT: CPT

## 2021-03-15 RX ORDER — SODIUM CHLORIDE 9 MG/ML
20 INJECTION, SOLUTION INTRAVENOUS ONCE
Status: COMPLETED | OUTPATIENT
Start: 2021-03-15 | End: 2021-03-15

## 2021-03-15 RX ORDER — SODIUM CHLORIDE 9 MG/ML
20 INJECTION, SOLUTION INTRAVENOUS ONCE
Status: CANCELLED | OUTPATIENT
Start: 2021-03-16

## 2021-03-15 RX ADMIN — IRON SUCROSE 300 MG: 20 INJECTION, SOLUTION INTRAVENOUS at 10:48

## 2021-03-15 RX ADMIN — SODIUM CHLORIDE 20 ML/HR: 0.9 INJECTION, SOLUTION INTRAVENOUS at 10:48

## 2021-03-15 NOTE — PLAN OF CARE
Problem: Potential for Falls  Goal: Patient will remain free of falls  Description: INTERVENTIONS:  - Assess patient frequently for physical needs  -  Identify cognitive and physical deficits and behaviors that affect risk of falls    -  [place] fall precautions as indicated by assessment   - Educate patient/family on patient safety including physical limitations  - Instruct patient to call for assistance with activity based on assessment  - Modify environment to reduce risk of injury  - Consider OT/PT consult to assist with strengthening/mobility  Outcome: Progressing

## 2021-03-29 ENCOUNTER — HOSPITAL ENCOUNTER (OUTPATIENT)
Dept: INFUSION CENTER | Facility: HOSPITAL | Age: 26
Discharge: HOME | End: 2021-03-29
Attending: INTERNAL MEDICINE

## 2021-03-29 VITALS
SYSTOLIC BLOOD PRESSURE: 128 MMHG | TEMPERATURE: 98.2 F | DIASTOLIC BLOOD PRESSURE: 56 MMHG | HEART RATE: 64 BPM | OXYGEN SATURATION: 99 % | RESPIRATION RATE: 18 BRPM

## 2021-03-29 DIAGNOSIS — D50.9 IRON DEFICIENCY ANEMIA, UNSPECIFIED IRON DEFICIENCY ANEMIA TYPE: Primary | ICD-10-CM

## 2021-03-29 PROCEDURE — 96365 THER/PROPH/DIAG IV INF INIT: CPT

## 2021-03-29 RX ORDER — SODIUM CHLORIDE 9 MG/ML
20 INJECTION, SOLUTION INTRAVENOUS ONCE
Status: COMPLETED | OUTPATIENT
Start: 2021-03-29 | End: 2021-03-29

## 2021-03-29 RX ORDER — SODIUM CHLORIDE 9 MG/ML
20 INJECTION, SOLUTION INTRAVENOUS ONCE
Status: CANCELLED | OUTPATIENT
Start: 2021-03-30

## 2021-03-29 RX ADMIN — SODIUM CHLORIDE 20 ML/HR: 9 INJECTION, SOLUTION INTRAVENOUS at 14:45

## 2021-03-29 RX ADMIN — IRON SUCROSE 300 MG: 20 INJECTION, SOLUTION INTRAVENOUS at 14:46

## 2021-04-19 ENCOUNTER — TELEPHONE (OUTPATIENT)
Dept: HEMATOLOGY ONCOLOGY | Facility: CLINIC | Age: 26
End: 2021-04-19

## 2021-04-19 NOTE — TELEPHONE ENCOUNTER
Patient states that she was to have 8 iron infusions but state she only had 4 and would luke to be scheduled for the remaining 4 treatments   Best call back  [phone]

## 2021-04-20 DIAGNOSIS — D50.9 IRON DEFICIENCY ANEMIA, UNSPECIFIED IRON DEFICIENCY ANEMIA TYPE: Primary | ICD-10-CM

## 2021-04-20 RX ORDER — SODIUM CHLORIDE 9 MG/ML
20 INJECTION, SOLUTION INTRAVENOUS ONCE
Status: CANCELLED | OUTPATIENT
Start: 2021-04-26

## 2021-04-20 NOTE — TELEPHONE ENCOUNTER
Reviewed message with Joycelyn she will place new orders for patient  Lm on vm advising patient on new orders also provided her with infusion phone number [phone] to schedule for days and times that are best for her

## 2021-04-23 ENCOUNTER — DOCUMENTATION (OUTPATIENT)
Dept: HEMATOLOGY ONCOLOGY | Facility: CLINIC | Age: 26
End: 2021-04-23

## 2021-04-23 NOTE — PROGRESS NOTES
recvd email from [employer] wanting to know if this pt has any ins coverage     Checked promise shows inactive  Called pt to find out if she ever applied for ma & I got her voicemail left her a message to call me back  Emailed this to Inder

## 2021-04-26 DIAGNOSIS — D50.9 IRON DEFICIENCY ANEMIA, UNSPECIFIED IRON DEFICIENCY ANEMIA TYPE: Primary | ICD-10-CM

## 2021-04-26 RX ORDER — SODIUM CHLORIDE 9 MG/ML
20 INJECTION, SOLUTION INTRAVENOUS ONCE
Status: CANCELLED | OUTPATIENT
Start: 2021-04-28

## 2021-04-28 ENCOUNTER — HOSPITAL ENCOUNTER (OUTPATIENT)
Dept: INFUSION CENTER | Facility: HOSPITAL | Age: 26
Discharge: HOME | End: 2021-04-28
Attending: INTERNAL MEDICINE

## 2021-04-28 VITALS
DIASTOLIC BLOOD PRESSURE: 51 MMHG | TEMPERATURE: 98.7 F | SYSTOLIC BLOOD PRESSURE: 115 MMHG | RESPIRATION RATE: 18 BRPM | HEART RATE: 57 BPM

## 2021-04-28 DIAGNOSIS — D50.9 IRON DEFICIENCY ANEMIA, UNSPECIFIED IRON DEFICIENCY ANEMIA TYPE: Primary | ICD-10-CM

## 2021-04-28 PROCEDURE — 96366 THER/PROPH/DIAG IV INF ADDON: CPT

## 2021-04-28 PROCEDURE — 96365 THER/PROPH/DIAG IV INF INIT: CPT

## 2021-04-28 RX ORDER — SODIUM CHLORIDE 9 MG/ML
20 INJECTION, SOLUTION INTRAVENOUS ONCE
Status: COMPLETED | OUTPATIENT
Start: 2021-04-28 | End: 2021-04-28

## 2021-04-28 RX ORDER — SODIUM CHLORIDE 9 MG/ML
20 INJECTION, SOLUTION INTRAVENOUS ONCE
Status: CANCELLED | OUTPATIENT
Start: 2021-05-05

## 2021-04-28 RX ADMIN — IRON SUCROSE 300 MG: 20 INJECTION, SOLUTION INTRAVENOUS at 12:56

## 2021-04-28 RX ADMIN — SODIUM CHLORIDE 20 ML/HR: 0.9 INJECTION, SOLUTION INTRAVENOUS at 12:55

## 2021-04-28 NOTE — PLAN OF CARE
Problem: Potential for Falls  Goal: Patient will remain free of falls  Description: INTERVENTIONS:  - Assess patient frequently for physical needs  -  Identify cognitive and physical deficits and behaviors that affect risk of falls    -  [place] fall precautions as indicated by assessment   - Educate patient/family on patient safety including physical limitations  - Instruct patient to call for assistance with activity based on assessment  - Modify environment to reduce risk of injury  - Consider OT/PT consult to assist with strengthening/mobility  Outcome: Progressing

## 2021-05-05 ENCOUNTER — HOSPITAL ENCOUNTER (OUTPATIENT)
Dept: INFUSION CENTER | Facility: HOSPITAL | Age: 26
Discharge: HOME | End: 2021-05-05
Attending: INTERNAL MEDICINE

## 2021-05-05 VITALS — TEMPERATURE: 98.3 F

## 2021-05-05 DIAGNOSIS — D50.9 IRON DEFICIENCY ANEMIA, UNSPECIFIED IRON DEFICIENCY ANEMIA TYPE: Primary | ICD-10-CM

## 2021-05-05 PROCEDURE — 96366 THER/PROPH/DIAG IV INF ADDON: CPT

## 2021-05-05 PROCEDURE — 96365 THER/PROPH/DIAG IV INF INIT: CPT

## 2021-05-05 RX ORDER — SODIUM CHLORIDE 9 MG/ML
20 INJECTION, SOLUTION INTRAVENOUS ONCE
Status: CANCELLED | OUTPATIENT
Start: 2021-05-12

## 2021-05-05 RX ORDER — SODIUM CHLORIDE 9 MG/ML
20 INJECTION, SOLUTION INTRAVENOUS ONCE
Status: COMPLETED | OUTPATIENT
Start: 2021-05-05 | End: 2021-05-05

## 2021-05-05 RX ADMIN — IRON SUCROSE 300 MG: 20 INJECTION, SOLUTION INTRAVENOUS at 13:21

## 2021-05-05 RX ADMIN — SODIUM CHLORIDE 20 ML/HR: 0.9 INJECTION, SOLUTION INTRAVENOUS at 13:21

## 2021-05-05 NOTE — PLAN OF CARE
Problem: Potential for Falls  Goal: Patient will remain free of falls  Description: INTERVENTIONS:  - Assess patient frequently for physical needs  -  Identify cognitive and physical deficits and behaviors that affect risk of falls    -  [place] fall precautions as indicated by assessment   - Educate patient/family on patient safety including physical limitations  - Instruct patient to call for assistance with activity based on assessment  - Modify environment to reduce risk of injury  - Consider OT/PT consult to assist with strengthening/mobility  Outcome: Progressing

## 2021-05-17 DIAGNOSIS — D50.9 IRON DEFICIENCY ANEMIA, UNSPECIFIED IRON DEFICIENCY ANEMIA TYPE: Primary | ICD-10-CM

## 2021-05-17 RX ORDER — SODIUM CHLORIDE 9 MG/ML
20 INJECTION, SOLUTION INTRAVENOUS ONCE
Status: CANCELLED | OUTPATIENT
Start: 2021-05-19

## 2021-05-19 ENCOUNTER — HOSPITAL ENCOUNTER (OUTPATIENT)
Dept: INFUSION CENTER | Facility: HOSPITAL | Age: 26
Discharge: HOME | End: 2021-05-19
Attending: INTERNAL MEDICINE

## 2021-05-19 VITALS — TEMPERATURE: 98 F

## 2021-05-19 DIAGNOSIS — D50.9 IRON DEFICIENCY ANEMIA, UNSPECIFIED IRON DEFICIENCY ANEMIA TYPE: Primary | ICD-10-CM

## 2021-05-19 PROCEDURE — 96365 THER/PROPH/DIAG IV INF INIT: CPT

## 2021-05-19 PROCEDURE — 96366 THER/PROPH/DIAG IV INF ADDON: CPT

## 2021-05-19 RX ORDER — SODIUM CHLORIDE 9 MG/ML
20 INJECTION, SOLUTION INTRAVENOUS ONCE
Status: CANCELLED | OUTPATIENT
Start: 2021-05-26

## 2021-05-19 RX ORDER — SODIUM CHLORIDE 9 MG/ML
20 INJECTION, SOLUTION INTRAVENOUS ONCE
Status: COMPLETED | OUTPATIENT
Start: 2021-05-19 | End: 2021-05-19

## 2021-05-19 RX ADMIN — SODIUM CHLORIDE 20 ML/HR: 0.9 INJECTION, SOLUTION INTRAVENOUS at 13:10

## 2021-05-19 RX ADMIN — IRON SUCROSE 300 MG: 20 INJECTION, SOLUTION INTRAVENOUS at 13:13

## 2021-05-19 NOTE — PLAN OF CARE
Problem: Potential for Falls  Goal: Patient will remain free of falls  Description: INTERVENTIONS:  - Assess patient frequently for physical needs  -  Identify cognitive and physical deficits and behaviors that affect risk of falls    -  [place] fall precautions as indicated by assessment   - Educate patient/family on patient safety including physical limitations  - Instruct patient to call for assistance with activity based on assessment  - Modify environment to reduce risk of injury  - Consider OT/PT consult to assist with strengthening/mobility  Outcome: Progressing

## 2021-06-08 ENCOUNTER — TELEPHONE (OUTPATIENT)
Dept: HEMATOLOGY ONCOLOGY | Facility: CLINIC | Age: 26
End: 2021-06-08

## 2021-06-09 ENCOUNTER — TELEPHONE (OUTPATIENT)
Dept: HEMATOLOGY ONCOLOGY | Facility: CLINIC | Age: 26
End: 2021-06-09

## 2022-10-06 ENCOUNTER — HOSPITAL ENCOUNTER (OUTPATIENT)
Facility: HOSPITAL | Age: 27
Setting detail: OBSERVATION
Discharge: HOME | End: 2022-10-07
Attending: EMERGENCY MEDICINE | Admitting: OBSTETRICS & GYNECOLOGY
Payer: COMMERCIAL

## 2022-10-06 DIAGNOSIS — D64.9 ANEMIA: Primary | ICD-10-CM

## 2022-10-06 DIAGNOSIS — N92.0 MENORRHAGIA: ICD-10-CM

## 2022-10-06 DIAGNOSIS — N83.201 RIGHT OVARIAN CYST: ICD-10-CM

## 2022-10-06 DIAGNOSIS — D50.9 IRON DEFICIENCY ANEMIA: ICD-10-CM

## 2022-10-06 LAB
ABO GROUP BLD: NORMAL
ALBUMIN SERPL BCP-MCNC: 4.2 G/DL (ref 3.5–5)
ALP SERPL-CCNC: 47 U/L (ref 34–104)
ALT SERPL W P-5'-P-CCNC: 8 U/L (ref 7–52)
ANION GAP SERPL CALCULATED.3IONS-SCNC: 7 MMOL/L (ref 4–13)
APTT PPP: 29 SECONDS (ref 23–37)
AST SERPL W P-5'-P-CCNC: 15 U/L (ref 13–39)
BASOPHILS # BLD AUTO: 0.07 THOUSANDS/ΜL (ref 0–0.1)
BASOPHILS NFR BLD AUTO: 1 % (ref 0–1)
BILIRUB SERPL-MCNC: 0.39 MG/DL (ref 0.2–1)
BLD GP AB SCN SERPL QL: NEGATIVE
BUN SERPL-MCNC: 10 MG/DL (ref 5–25)
CALCIUM SERPL-MCNC: 8.9 MG/DL (ref 8.4–10.2)
CARDIAC TROPONIN I PNL SERPL HS: <2 NG/L
CHLORIDE SERPL-SCNC: 107 MMOL/L (ref 96–108)
CO2 SERPL-SCNC: 24 MMOL/L (ref 21–32)
CREAT SERPL-MCNC: 0.73 MG/DL (ref 0.6–1.3)
EOSINOPHIL # BLD AUTO: 0.11 THOUSAND/ΜL (ref 0–0.61)
EOSINOPHIL NFR BLD AUTO: 2 % (ref 0–6)
ERYTHROCYTE [DISTWIDTH] IN BLOOD BY AUTOMATED COUNT: 26.3 % (ref 11.6–15.1)
GFR SERPL CREATININE-BSD FRML MDRD: 112 ML/MIN/1.73SQ M
GLUCOSE SERPL-MCNC: 115 MG/DL (ref 65–140)
HCT VFR BLD AUTO: 19.4 % (ref 34.8–46.1)
HGB BLD-MCNC: 4.7 G/DL (ref 11.5–15.4)
IMM GRANULOCYTES # BLD AUTO: 0.03 THOUSAND/UL (ref 0–0.2)
IMM GRANULOCYTES NFR BLD AUTO: 1 % (ref 0–2)
INR PPP: 1.13 (ref 0.84–1.19)
LDH SERPL-CCNC: 193 U/L (ref 140–271)
LYMPHOCYTES # BLD AUTO: 1.58 THOUSANDS/ΜL (ref 0.6–4.47)
LYMPHOCYTES NFR BLD AUTO: 25 % (ref 14–44)
MCH RBC QN AUTO: 15.3 PG (ref 26.8–34.3)
MCHC RBC AUTO-ENTMCNC: 24.2 G/DL (ref 31.4–37.4)
MCV RBC AUTO: 63 FL (ref 82–98)
MONOCYTES # BLD AUTO: 0.52 THOUSAND/ΜL (ref 0.17–1.22)
MONOCYTES NFR BLD AUTO: 8 % (ref 4–12)
NEUTROPHILS # BLD AUTO: 4.01 THOUSANDS/ΜL (ref 1.85–7.62)
NEUTS SEG NFR BLD AUTO: 63 % (ref 43–75)
NRBC BLD AUTO-RTO: 1 /100 WBCS
PLATELET # BLD AUTO: 732 THOUSANDS/UL (ref 149–390)
PMV BLD AUTO: 10.2 FL (ref 8.9–12.7)
POTASSIUM SERPL-SCNC: 3.6 MMOL/L (ref 3.5–5.3)
PROT SERPL-MCNC: 7.2 G/DL (ref 6.4–8.4)
PROTHROMBIN TIME: 14.8 SECONDS (ref 11.6–14.5)
RBC # BLD AUTO: 3.08 MILLION/UL (ref 3.81–5.12)
RH BLD: POSITIVE
SODIUM SERPL-SCNC: 138 MMOL/L (ref 135–147)
SPECIMEN EXPIRATION DATE: NORMAL
WBC # BLD AUTO: 6.32 THOUSAND/UL (ref 4.31–10.16)

## 2022-10-06 PROCEDURE — 86920 COMPATIBILITY TEST SPIN: CPT

## 2022-10-06 PROCEDURE — 85610 PROTHROMBIN TIME: CPT

## 2022-10-06 PROCEDURE — 36415 COLL VENOUS BLD VENIPUNCTURE: CPT

## 2022-10-06 PROCEDURE — 93005 ELECTROCARDIOGRAM TRACING: CPT

## 2022-10-06 PROCEDURE — 86901 BLOOD TYPING SEROLOGIC RH(D): CPT

## 2022-10-06 PROCEDURE — 36430 TRANSFUSION BLD/BLD COMPNT: CPT

## 2022-10-06 PROCEDURE — 86850 RBC ANTIBODY SCREEN: CPT

## 2022-10-06 PROCEDURE — 85025 COMPLETE CBC W/AUTO DIFF WBC: CPT | Performed by: EMERGENCY MEDICINE

## 2022-10-06 PROCEDURE — 85730 THROMBOPLASTIN TIME PARTIAL: CPT

## 2022-10-06 PROCEDURE — 99285 EMERGENCY DEPT VISIT HI MDM: CPT | Performed by: EMERGENCY MEDICINE

## 2022-10-06 PROCEDURE — 86900 BLOOD TYPING SEROLOGIC ABO: CPT

## 2022-10-06 PROCEDURE — P9016 RBC LEUKOCYTES REDUCED: HCPCS

## 2022-10-06 PROCEDURE — 83615 LACTATE (LD) (LDH) ENZYME: CPT

## 2022-10-06 PROCEDURE — 80053 COMPREHEN METABOLIC PANEL: CPT | Performed by: EMERGENCY MEDICINE

## 2022-10-06 PROCEDURE — NC001 PR NO CHARGE: Performed by: OBSTETRICS & GYNECOLOGY

## 2022-10-06 PROCEDURE — 99285 EMERGENCY DEPT VISIT HI MDM: CPT

## 2022-10-06 PROCEDURE — 84484 ASSAY OF TROPONIN QUANT: CPT | Performed by: EMERGENCY MEDICINE

## 2022-10-06 RX ORDER — FAMOTIDINE 10 MG/ML
20 INJECTION, SOLUTION INTRAVENOUS ONCE
Status: COMPLETED | OUTPATIENT
Start: 2022-10-06 | End: 2022-10-06

## 2022-10-06 RX ADMIN — FAMOTIDINE 20 MG: 10 INJECTION, SOLUTION INTRAVENOUS at 22:31

## 2022-10-06 NOTE — LETTER
[facility]  [address]  [address] [address]  Dept: [phone]    October 7, 2022     Patient: Stefani Santizo   YOB: 1995   Date of Visit: 10/6/2022       To Whom it May Concern:    Stefani Santizo is under my professional care  She was seen in the hospital from 10/6/2022   to 10/07/22  She may return to school on 10/9/2022 without limitations  If you have any questions or concerns, please don't hesitate to call           Sincerely,          Roxanna Gu MD

## 2022-10-06 NOTE — LETTER
[facility]  [address]  [address] [facility] [address]  Dept: [phone]    October 7, 2022     Patient: Malcolm Morley   YOB: 1995   Date of Visit: 10/6/2022       To Whom it May Concern:    Malcolm Morley is under my professional care  She was seen in the hospital from 10/6/2022   to 10/07/22  She may return to work on 10/9/22 without limitations  If you have any questions or concerns, please don't hesitate to call           Sincerely,          Brandon Mcqueen MD

## 2022-10-07 ENCOUNTER — APPOINTMENT (OUTPATIENT)
Dept: ULTRASOUND IMAGING | Facility: HOSPITAL | Age: 27
End: 2022-10-07
Payer: COMMERCIAL

## 2022-10-07 VITALS
DIASTOLIC BLOOD PRESSURE: 68 MMHG | TEMPERATURE: 98.4 F | SYSTOLIC BLOOD PRESSURE: 110 MMHG | OXYGEN SATURATION: 98 % | HEART RATE: 73 BPM | RESPIRATION RATE: 18 BRPM

## 2022-10-07 LAB
ATRIAL RATE: 73 BPM
BILIRUB UR QL STRIP: NEGATIVE
CLARITY UR: NORMAL
COLOR UR: NORMAL
ERYTHROCYTE [DISTWIDTH] IN BLOOD BY AUTOMATED COUNT: 27.8 % (ref 11.6–15.1)
ERYTHROCYTE [DISTWIDTH] IN BLOOD BY AUTOMATED COUNT: 28.5 % (ref 11.6–15.1)
GLUCOSE UR STRIP-MCNC: NEGATIVE MG/DL
HCT VFR BLD AUTO: 20.9 % (ref 34.8–46.1)
HCT VFR BLD AUTO: 27 % (ref 34.8–46.1)
HGB BLD-MCNC: 5.5 G/DL (ref 11.5–15.4)
HGB BLD-MCNC: 7.3 G/DL (ref 11.5–15.4)
HGB UR QL STRIP.AUTO: NEGATIVE
KETONES UR STRIP-MCNC: NEGATIVE MG/DL
LEUKOCYTE ESTERASE UR QL STRIP: NEGATIVE
MCH RBC QN AUTO: 17.4 PG (ref 26.8–34.3)
MCH RBC QN AUTO: 18.5 PG (ref 26.8–34.3)
MCHC RBC AUTO-ENTMCNC: 26.3 G/DL (ref 31.4–37.4)
MCHC RBC AUTO-ENTMCNC: 27 G/DL (ref 31.4–37.4)
MCV RBC AUTO: 66 FL (ref 82–98)
MCV RBC AUTO: 68 FL (ref 82–98)
NITRITE UR QL STRIP: NEGATIVE
P AXIS: 69 DEGREES
PH UR STRIP.AUTO: 5.5 [PH]
PLATELET # BLD AUTO: 636 THOUSANDS/UL (ref 149–390)
PLATELET # BLD AUTO: 667 THOUSANDS/UL (ref 149–390)
PMV BLD AUTO: 9.8 FL (ref 8.9–12.7)
PMV BLD AUTO: 9.8 FL (ref 8.9–12.7)
PR INTERVAL: 142 MS
PROT UR STRIP-MCNC: NEGATIVE MG/DL
QRS AXIS: 74 DEGREES
QRSD INTERVAL: 86 MS
QT INTERVAL: 420 MS
QTC INTERVAL: 462 MS
RBC # BLD AUTO: 3.17 MILLION/UL (ref 3.81–5.12)
RBC # BLD AUTO: 3.95 MILLION/UL (ref 3.81–5.12)
SP GR UR STRIP.AUTO: 1.01 (ref 1–1.03)
T WAVE AXIS: 72 DEGREES
UROBILINOGEN UR STRIP-ACNC: <2 MG/DL
VENTRICULAR RATE: 73 BPM
WBC # BLD AUTO: 5.77 THOUSAND/UL (ref 4.31–10.16)
WBC # BLD AUTO: 6.74 THOUSAND/UL (ref 4.31–10.16)

## 2022-10-07 PROCEDURE — 85027 COMPLETE CBC AUTOMATED: CPT | Performed by: OBSTETRICS & GYNECOLOGY

## 2022-10-07 PROCEDURE — 81003 URINALYSIS AUTO W/O SCOPE: CPT

## 2022-10-07 PROCEDURE — 93010 ELECTROCARDIOGRAM REPORT: CPT | Performed by: INTERNAL MEDICINE

## 2022-10-07 PROCEDURE — 76830 TRANSVAGINAL US NON-OB: CPT

## 2022-10-07 PROCEDURE — 87491 CHLMYD TRACH DNA AMP PROBE: CPT

## 2022-10-07 PROCEDURE — 87591 N.GONORRHOEAE DNA AMP PROB: CPT

## 2022-10-07 PROCEDURE — P9016 RBC LEUKOCYTES REDUCED: HCPCS

## 2022-10-07 PROCEDURE — 76856 US EXAM PELVIC COMPLETE: CPT

## 2022-10-07 RX ORDER — HYDROXYZINE HYDROCHLORIDE 25 MG/1
25 TABLET, FILM COATED ORAL EVERY 6 HOURS PRN
Refills: 0
Start: 2022-10-07 | End: 2022-10-11

## 2022-10-07 RX ORDER — HYDROXYZINE HYDROCHLORIDE 25 MG/1
25 TABLET, FILM COATED ORAL EVERY 6 HOURS PRN
Status: DISCONTINUED | OUTPATIENT
Start: 2022-10-07 | End: 2022-10-07 | Stop reason: HOSPADM

## 2022-10-07 RX ADMIN — HYDROXYZINE HYDROCHLORIDE 25 MG: 25 TABLET, FILM COATED ORAL at 01:55

## 2022-10-07 NOTE — PLAN OF CARE
Problem: Potential for Falls  Goal: Patient will remain free of falls  Description: INTERVENTIONS:  - Educate patient/family on patient safety including physical limitations  - Instruct patient to call for assistance with activity   - Consult OT/PT to assist with strengthening/mobility   - Keep Call bell within reach  - Keep bed low and locked with side rails adjusted as appropriate  - Keep care items and personal belongings within reach  - Initiate and maintain comfort rounds  - Make Fall Risk Sign visible to staff  - Offer Toileting every 2 Hours, in advance of need  - Initiate/Maintain alarm  - Obtain necessary fall risk management equipment  - Apply yellow socks and bracelet for high fall risk patients  - Consider moving patient to room near nurses station  Outcome: Progressing     Problem: PAIN - ADULT  Goal: Verbalizes/displays adequate comfort level or baseline comfort level  Description: Interventions:  - Encourage patient to monitor pain and request assistance  - Assess pain using appropriate pain scale  - Administer analgesics based on type and severity of pain and evaluate response  - Implement non-pharmacological measures as appropriate and evaluate response  - Consider cultural and social influences on pain and pain management  - Notify physician/advanced practitioner if interventions unsuccessful or patient reports new pain  Outcome: Progressing     Problem: INFECTION - ADULT  Goal: Absence or prevention of progression during hospitalization  Description: INTERVENTIONS:  - Assess and monitor for signs and symptoms of infection  - Monitor lab/diagnostic results  - Monitor all insertion sites, i e  indwelling lines, tubes, and drains  - Monitor endotracheal if appropriate and nasal secretions for changes in amount and color  - [place] appropriate cooling/warming therapies per order  - Administer medications as ordered  - Instruct and encourage patient and family to use good hand hygiene technique  - Identify and instruct in appropriate isolation precautions for identified infection/condition  Outcome: Progressing  Goal: Absence of fever/infection during neutropenic period  Description: INTERVENTIONS:  - Monitor WBC    Outcome: Progressing     Problem: SAFETY ADULT  Goal: Patient will remain free of falls  Description: INTERVENTIONS:  - Educate patient/family on patient safety including physical limitations  - Instruct patient to call for assistance with activity   - Consult OT/PT to assist with strengthening/mobility   - Keep Call bell within reach  - Keep bed low and locked with side rails adjusted as appropriate  - Keep care items and personal belongings within reach  - Initiate and maintain comfort rounds  - Make Fall Risk Sign visible to staff  - Offer Toileting every 2 Hours, in advance of need  - Initiate/Maintain alarm  - Obtain necessary fall risk management equipment  - Apply yellow socks and bracelet for high fall risk patients  - Consider moving patient to room near nurses station  Outcome: Progressing  Goal: Maintain or return to baseline ADL function  Description: INTERVENTIONS:  -  Assess patient's ability to carry out ADLs; assess patient's baseline for ADL function and identify physical deficits which impact ability to perform ADLs (bathing, care of mouth/teeth, toileting, grooming, dressing, etc )  - Assess/evaluate cause of self-care deficits   - Assess range of motion  - Assess patient's mobility; develop plan if impaired  - Assess patient's need for assistive devices and provide as appropriate  - Encourage maximum independence but intervene and supervise when necessary  - Involve family in performance of ADLs  - Assess for home care needs following discharge   - Consider OT consult to assist with ADL evaluation and planning for discharge  - Provide patient education as appropriate  Outcome: Progressing  Goal: Maintains/Returns to pre admission functional level  Description: INTERVENTIONS:  - Perform BMAT or MOVE assessment daily    - Set and communicate daily mobility goal to care team and patient/family/caregiver  - Collaborate with rehabilitation services on mobility goals if consulted  - Perform Range of Motion 3 times a day  - Reposition patient every 2 hours  - Dangle patient 3 times a day  - Stand patient 3 times a day  - Ambulate patient 3 times a day  - Out of bed to chair 3 times a day   - Out of bed for meals 3 times a day  - Out of bed for toileting  - Record patient progress and toleration of activity level   Outcome: Progressing     Problem: DISCHARGE PLANNING  Goal: Discharge to home or other facility with appropriate resources  Description: INTERVENTIONS:  - Identify barriers to discharge w/patient and caregiver  - Arrange for needed discharge resources and transportation as appropriate  - Identify discharge learning needs (meds, wound care, etc )  - Arrange for interpretive services to assist at discharge as needed  - Refer to Case Management Department for coordinating discharge planning if the patient needs post-hospital services based on physician/advanced practitioner order or complex needs related to functional status, cognitive ability, or social support system  Outcome: Progressing     Problem: Knowledge Deficit  Goal: Patient/family/caregiver demonstrates understanding of disease process, treatment plan, medications, and discharge instructions  Description: Complete learning assessment and assess knowledge base    Interventions:  - Provide teaching at level of understanding  - Provide teaching via preferred learning methods  Outcome: Progressing no concerns

## 2022-10-07 NOTE — PROGRESS NOTES
Progress Note - OB/GYN  Ann Moreno 28 y o  female MRN: [id number]  Unit/Bed#: S -64 Encounter: [id number]    Assessment and [address] has previously been a patient of Ibrahim Dose, currently admitted with hgb 4 7 in the setting of chronic anemia  Menorrhagia  Assessment & Plan  Pt has hx of menorrhagia and EUGENE  Previously on OCP's and Depo  Questionable hx of DVT (not documented but mom claims this occurred while pt was taking OCP's)  Pt needs follow up in clinic to establish care and discuss longterm options (Consider IUD outpt)    Anemia  Assessment & Plan  Hgb 4 7 on arrival in ED  Hx of multiple ED visits for anemia with Hgb between 5-6  2u PRBC crossed  Pt not currently bleeding   Presented with fatigue, dyspnea on exertion, chest pain and pre-syncope, now improved  Pt has hx of EUGENE, consider dose of Venofer while inpatient    4 7 --> 1 U pRBC --> 5 5 this AM--> 1 U pRBC --> 4hr post-transfusion CBC      Disposition    - Pending continued symptomatic relief, infusion of second unit      Subjective/Objective     Chief Complaint: Chronic anemia    Subjective:    Ann Moreno is currently admitted for treatment of chronic anemia, not currently bleeding  She reports feeling well overnight  Her difficulty with ambulation and chest pain have resolved since she's received her first unit of blood  Denies other pain  Patient is currently voiding and passing flatus, no BM while admitted  She is tolerating PO, and denies nausea or vomitting  Patient denies fever, chills, chest pain, shortness of breath, or calf tenderness  She is recovering well and is stable         Vitals:   /58 (BP Location: Right arm)   Pulse 66   Temp 98 4 °F (36 9 °C) (Oral)   Resp 18   LMP 09/24/2022 (Exact Date)   SpO2 96%       Intake/Output Summary (Last 24 hours) at 10/7/2022 0841  Last data filed at 10/6/2022 2339  Gross per 24 hour   Intake 360 ml   Output --   Net 360 ml Invasive Devices  Timeline    Peripheral Intravenous Line  Duration           Peripheral IV 10/06/22 Left Antecubital <1 day                Physical Exam:   GEN: Jeanna Wilson appears well, sleeping comfortably before waking easily, pleasant and cooperative   CARDIO: RRR, no murmurs or rubs  RESP:  CTAB, no wheezes or rales   ABDOMEN: soft, no tenderness, no distention  EXTREMITIES: SCDs on, non tender, no erythema      Labs:     Hemoglobin   Date Value Ref Range Status   10/07/2022 5 5 (LL) 11 5 - 15 4 g/dL Preliminary     Comment: This result has been called to Tracy Carrington by Ryan Davis on 10/07/2022 08:11:14, and has been read back  10/06/2022 4 7 (LL) 11 5 - 15 4 g/dL Final     WBC   Date Value Ref Range Status   10/07/2022 5 77 4 31 - 10 16 Thousand/uL Preliminary   10/06/2022 6 32 4 31 - 10 16 Thousand/uL Final     Platelets   Date Value Ref Range Status   10/07/2022 636 (H) 149 - 390 Thousands/uL Preliminary   10/06/2022 732 (H) 149 - 390 Thousands/uL Final     Creatinine   Date Value Ref Range Status   10/06/2022 0 73 0 60 - 1 30 mg/dL Final     Comment:     Standardized to IDMS reference method   01/15/2021 0 79 0 60 - 1 30 mg/dL Final     Comment:     Standardized to IDMS reference method     AST   Date Value Ref Range Status   10/06/2022 15 13 - 39 U/L Final     Comment:     Specimen collection should occur prior to Sulfasalazine administration due to the potential for falsely depressed results  ALT   Date Value Ref Range Status   10/06/2022 8 7 - 52 U/L Final     Comment:     Specimen collection should occur prior to Sulfasalazine administration due to the potential for falsely depressed results             Padma Alamo  10/7/2022

## 2022-10-07 NOTE — UTILIZATION REVIEW
Initial Clinical Review    Admission: Date/Time/Statement:   Admission Orders (From admission, onward)     Ordered        10/06/22 2210  Place in Observation  Once                      Orders Placed This Encounter   Procedures    Place in Observation     Standing Status:   Standing     Number of Occurrences:   1     Order Specific Question:   Level of Care     Answer:   Med Surg [16]     ED Arrival Information     Expected   -    Arrival   10/6/2022 18:58    Acuity   Urgent            Means of arrival   Walk-In    Escorted by   Family Member    Service   OB/GYN    Admission type   Emergency            Arrival complaint   Dizziness            Chief Complaint   Patient presents with    Medical Problem     Pt thinks her "iron is really low", pt states she felt dizzy and faint in the shower aprox 1600  Hx of anemia        Initial Presentation: 28 y o  female with hx iron deficiency anemia-receives venofer, menorrhagia -previously on OCP's and depo, possible DVT after OCP use who presents to ED from home with fatigue, BOLDEN, chest pain, presyncope x several weeks  Pt started her period on 9/22 and her menses was longer than normal and heavier with the passage of clots  Menses lasted 7-8 days   PE WNL   Labs - Hgb 4 7  Pt given IV famotidine in ED  Pt admitted as OBS with anemia, menorrhagia  Plan - Transfuse 2 U PRBC, monitor CBC, consider IV venofer while in house   F/U as outpt todetermine long term options   Date: 10/7  Hgb 5 5 s/p 1 U PRBC  Plan - transfuse 2nd unit PRBC , obtain CBC 4hrs after transfusion   ( Pt requested 2nd unit not be given overnight last night )  Difficulty with ambulation and chest pain have resolved since she's received her first unit of blood      ED Triage Vitals   Temperature Pulse Respirations Blood Pressure SpO2   10/06/22 1902 10/06/22 1902 10/06/22 1902 10/06/22 1902 10/06/22 1902   98 5 °F (36 9 °C) 76 16 123/58 100 %      Temp Source Heart Rate Source Patient Position - Orthostatic VS BP Location FiO2 (%)   10/06/22 1902 10/06/22 1902 10/07/22 0500 10/07/22 0500 --   Oral Monitor Lying Left arm       Pain Score       10/07/22 0300       No Pain          Wt Readings from Last 1 Encounters:   02/10/21 65 5 kg (144 lb 6 4 oz)     Additional Vital Signs:   Date/Time Temp Pulse Resp BP MAP (mmHg) SpO2   10/07/22 0700 98 4 °F (36 9 °C) 66 18 120/58 83 96 %   10/07/22 0500 98 4 °F (36 9 °C) 84 18 113/51 74 78 % Abnormal    10/06/22 2338 98 8 °F (37 1 °C) 84 16 107/66 -- 98 %   10/06/22 2245 99 °F (37 2 °C) 87 16 112/56 -- 99 %   10/06/22 2227 98 8 °F (37 1 °C) 88 18 110/52 -- 100 %       Pertinent Labs/Diagnostic Test Results:    10/6 ECG-Interpretation: normal     Rate:     ECG rate:  73     ECG rate assessment: normal     Rhythm:     Rhythm: sinus rhythm     Ectopy:     US pelvis complete w transvaginal    (Results Pending)         Results from last 7 days   Lab Units 10/07/22  0528 10/06/22  1909   WBC Thousand/uL 5 77 6 32   HEMOGLOBIN g/dL 5 5* 4 7*   HEMATOCRIT % 20 9* 19 4*   PLATELETS Thousands/uL 636* 732*   NEUTROS ABS Thousands/µL  --  4 01         Results from last 7 days   Lab Units 10/06/22  1909   SODIUM mmol/L 138   POTASSIUM mmol/L 3 6   CHLORIDE mmol/L 107   CO2 mmol/L 24   ANION GAP mmol/L 7   BUN mg/dL 10   CREATININE mg/dL 0 73   EGFR ml/min/1 73sq m 112   CALCIUM mg/dL 8 9     Results from last 7 days   Lab Units 10/06/22  1909   AST U/L 15   ALT U/L 8   ALK PHOS U/L 47   TOTAL PROTEIN g/dL 7 2   ALBUMIN g/dL 4 2   TOTAL BILIRUBIN mg/dL 0 39         Results from last 7 days   Lab Units 10/06/22  1909   GLUCOSE RANDOM mg/dL 115               Results from last 7 days   Lab Units 10/06/22  1909   HS TNI 0HR ng/L <2         Results from last 7 days   Lab Units 10/06/22  1909   PROTIME seconds 14 8*   INR  1 13   PTT seconds 29         ED Treatment:   Medication Administration from 10/06/2022 1858 to 10/07/2022 0031       Date/Time Order Dose Route Action     10/06/2022 2231 Famotidine (PF) (PEPCID) injection 20 mg 20 mg Intravenous Given        Past Medical History:   Diagnosis Date    Anemia     Herpes     Syphilis     resolved     Present on Admission:   Anemia   Menorrhagia      Admitting Diagnosis: Menorrhagia [N92 0]  Dizziness [R42]  Anemia [D64 9]  Age/Sex: 28 y o  female  Admission Orders:  Scheduled Medications:     Continuous IV Infusions:     PRN Meds:  hydrOXYzine HCL, 25 mg, Oral, Q6H PRN x1 10/7        None    Network Utilization Review Department  ATTENTION: Please call with any questions or concerns to [phone] and carefully listen to the prompts so that you are directed to the right person  All voicemails are confidential   [facility] all requests for admission clinical reviews, approved or denied determinations and any other requests to dedicated fax number below belonging to the campus where the patient is receiving treatment   List of dedicated fax numbers for the Facilities:  [address] DENIALS (Administrative/Medical Necessity) [phone]   [address] (Maternity/NICU/Pediatrics) [phone]   [address]  [address] [address] [place] [address] Chirag Nune[address] [facility] [phone]

## 2022-10-07 NOTE — ED PROVIDER NOTES
History  Chief Complaint   Patient presents with    Medical Problem     Pt thinks her "iron is really low", pt states she felt dizzy and faint in the shower aprox 1600  Hx of anemia      26-year-old female history of iron deficiency anemia from menorrhagia presenting with fatigue and presyncope  Patient states for the past few weeks she has been very tired  She feels somewhat similar to when she had iron deficiency anemia in the past   This morning, he woke up late and still felt tired, went take a shower and felt like she was going to pass out  For the past few weeks has been having some shortness of breath that is possibly exertional in nature  Some vague chest pain which is also possibly exertional in nature  LMP was 2 weeks ago, a few days late, pretty heavy needing to change fully soaked pad every hour for the 1st 2 days  Some concern that this might have been an early pregnancy lost   Has been having some vague epigastric pain from spicy meals  No bloody or black tarry stools  No urinary symptoms  Prior to Admission Medications   Prescriptions Last Dose Informant Patient Reported? Taking? Cyanocobalamin (B-12 PO)  Self Yes No   Sig: Take 1 tablet by mouth daily   Multiple Vitamins-Minerals (MULTIVITAL PO)  Self Yes No   Sig: Take 1 tablet by mouth daily   etonogestrel-ethinyl estradiol (NUVARING) 0 12-0 015 MG/24HR vaginal ring  Self No No   Sig: Insert vaginally and leave in place for 3 consecutive weeks, then remove for 1 week        Facility-Administered Medications: None       Past Medical History:   Diagnosis Date    Anemia     Herpes     Syphilis     resolved       Past Surgical History:   Procedure Laterality Date    HERNIA REPAIR      HERNIA REPAIR  [id number]    umbilical hernia repair - resolved       Family History   Problem Relation Age of Onset    Asthma Mother     Coronary artery disease Mother     Hypertension Mother     Diabetes Maternal Grandmother     Hypertension Maternal Grandmother     Stroke Maternal Grandfather         syndrome    No Known Problems Brother     No Known Problems Brother      I have reviewed and agree with the history as documented  E-Cigarette/Vaping    E-Cigarette Use Current Some Day User     Comments couple times a week      E-Cigarette/Vaping Substances    Nicotine Yes     THC Yes     CBD Yes     Flavoring No     Other No     Unknown No      Social History     Tobacco Use    Smoking status: Former Smoker     Packs/day: 0 25     Types: Cigarettes    Smokeless tobacco: Never Used   Vaping Use    Vaping Use: Some days    Substances: Nicotine, THC, CBD   Substance Use Topics    Alcohol use: Yes    Drug use: Yes     Types: Marijuana        Review of Systems   Constitutional: Positive for activity change and fatigue  Negative for fever and unexpected weight change  HENT: Negative for postnasal drip and rhinorrhea  Eyes: Negative for visual disturbance  Respiratory: Positive for shortness of breath  Negative for cough and chest tightness  Cardiovascular: Positive for chest pain  Negative for palpitations and leg swelling  Gastrointestinal: Negative for abdominal pain, anal bleeding, blood in stool, constipation, diarrhea, nausea and vomiting  Genitourinary: Positive for menstrual problem  Negative for dysuria and hematuria  Skin: Negative for color change and wound  Allergic/Immunologic: Negative for immunocompromised state  Neurological: Positive for light-headedness  Negative for dizziness and syncope  Psychiatric/Behavioral: Negative for dysphoric mood  The patient is not nervous/anxious  All other systems reviewed and are negative        Physical Exam  ED Triage Vitals [10/06/22 1902]   Temperature Pulse Respirations Blood Pressure SpO2   98 5 °F (36 9 °C) 76 16 123/58 100 %      Temp Source Heart Rate Source Patient Position - Orthostatic VS BP Location FiO2 (%)   Oral Monitor -- -- --      Pain Score       -- Orthostatic Vital Signs  Vitals:    10/06/22 1902 10/06/22 2227 10/06/22 2245 10/06/22 2338   BP: 123/58 110/52 112/56 107/66   Pulse: 76 88 87 84       Physical Exam  Vitals and nursing note reviewed  Exam conducted with a chaperone present (Female chaperone present for NIRU)  Constitutional:       Appearance: She is ill-appearing  She is not toxic-appearing or diaphoretic  HENT:      Head: Normocephalic and atraumatic  Right Ear: External ear normal       Left Ear: External ear normal       Nose: Nose normal       Mouth/Throat:      Mouth: Mucous membranes are moist    Eyes:      General: No scleral icterus  Extraocular Movements: Extraocular movements intact  Conjunctiva/sclera: Conjunctivae normal    Cardiovascular:      Rate and Rhythm: Normal rate and regular rhythm  Pulses: Normal pulses  Radial pulses are 2+ on the right side  Dorsalis pedis pulses are 2+ on the right side and 2+ on the left side  Heart sounds: Normal heart sounds, S1 normal and S2 normal  No murmur heard  Pulmonary:      Effort: Pulmonary effort is normal  No respiratory distress  Breath sounds: Normal breath sounds  No stridor  No wheezing  Abdominal:      General: Bowel sounds are normal       Palpations: Abdomen is soft  Tenderness: There is no abdominal tenderness  Genitourinary:     Rectum: Normal  Guaiac result negative  Musculoskeletal:         General: Normal range of motion  Cervical back: Normal range of motion  Right lower leg: No edema  Left lower leg: No edema  Skin:     General: Skin is warm and dry  Coloration: Skin is not jaundiced  Neurological:      General: No focal deficit present  Mental Status: She is alert and oriented to person, place, and time     Psychiatric:         Mood and Affect: Mood normal          ED Medications  Medications   Famotidine (PF) (PEPCID) injection 20 mg (20 mg Intravenous Given 10/6/22 2231) Diagnostic Studies  Results Reviewed     Procedure Component Value Units Date/Time    LD,Blood [[id number]]  (Normal) Collected: 10/06/22 1909    Lab Status: Final result Specimen: Blood from Arm, Left Updated: 10/06/22 2124      U/L     Protime-INR [[id number]]  (Abnormal) Collected: 10/06/22 1909    Lab Status: Final result Specimen: Blood from Arm, Left Updated: 10/06/22 2115     Protime 14 8 seconds      INR 1 13    APTT [[id number]]  (Normal) Collected: 10/06/22 1909    Lab Status: Final result Specimen: Blood from Arm, Left Updated: 10/06/22 2115     PTT 29 seconds     UA w Reflex to Microscopic w Reflex to Culture [[id number]]     Lab Status: No result Specimen: Urine     POCT pregnancy, urine [[id number]]     Lab Status: No result     Chlamydia/GC amplified DNA by PCR [[id number]]     Lab Status: No result     HS Troponin 0hr (reflex protocol) [[id number]]  (Normal) Collected: 10/06/22 1909    Lab Status: Final result Specimen: Blood from Arm, Left Updated: 10/06/22 1954     hs TnI 0hr <2 ng/L     CBC and differential [[id number]]  (Abnormal) Collected: 10/06/22 1909    Lab Status: Final result Specimen: Blood from Arm, Left Updated: 10/06/22 1949     WBC 6 32 Thousand/uL      RBC 3 08 Million/uL      Hemoglobin 4 7 g/dL      Hematocrit 19 4 %      MCV 63 fL      MCH 15 3 pg      MCHC 24 2 g/dL      RDW 26 3 %      MPV 10 2 fL      Platelets 741 Thousands/uL      nRBC 1 /100 WBCs      Neutrophils Relative 63 %      Immat GRANS % 1 %      Lymphocytes Relative 25 %      Monocytes Relative 8 %      Eosinophils Relative 2 %      Basophils Relative 1 %      Neutrophils Absolute 4 01 Thousands/µL      Immature Grans Absolute 0 03 Thousand/uL      Lymphocytes Absolute 1 58 Thousands/µL      Monocytes Absolute 0 52 Thousand/µL      Eosinophils Absolute 0 11 Thousand/µL      Basophils Absolute 0 07 Thousands/µL     Narrative: This is an appended report    These results have been appended to a previously verified report      Comprehensive metabolic panel [[id number]] Collected: 10/06/22 1909    Lab Status: Final result Specimen: Blood from Arm, Left Updated: 10/06/22 1947     Sodium 138 mmol/L      Potassium 3 6 mmol/L      Chloride 107 mmol/L      CO2 24 mmol/L      ANION GAP 7 mmol/L      BUN 10 mg/dL      Creatinine 0 73 mg/dL      Glucose 115 mg/dL      Calcium 8 9 mg/dL      AST 15 U/L      ALT 8 U/L      Alkaline Phosphatase 47 U/L      Total Protein 7 2 g/dL      Albumin 4 2 g/dL      Total Bilirubin 0 39 mg/dL      eGFR 112 ml/min/1 73sq m     Narrative:      [place] guidelines for Chronic Kidney Disease (CKD):     Stage 1 with normal or high GFR (GFR > 90 mL/min/1 73 square meters)    Stage 2 Mild CKD (GFR = 60-89 mL/min/1 73 square meters)    Stage 3A Moderate CKD (GFR = 45-59 mL/min/1 73 square meters)    Stage 3B Moderate CKD (GFR = 30-44 mL/min/1 73 square meters)    Stage 4 Severe CKD (GFR = 15-29 mL/min/1 73 square meters)    Stage 5 End Stage CKD (GFR <15 mL/min/1 73 square meters)  Note: GFR calculation is accurate only with a steady state creatinine                 US pelvis complete w transvaginal    (Results Pending)         Procedures  Procedures      ED Course  ED Course as of 10/07/22 0015   Thu Oct 06, 2022   1952 Hemoglobin(!!): 4 7   2019 Platelet Count(!): 669   2019 hs TnI 0hr: <2   2019 Comprehensive metabolic panel  wnl                                       MDM    Disposition  Final diagnoses:   Menorrhagia   Anemia     Time reflects when diagnosis was documented in both MDM as applicable and the Disposition within this note     Time User Action Codes Description Comment    10/6/2022 10:17 PM Kassi Phalen Add [N92 0] Menorrhagia     10/6/2022 10:17 PM Kassi Phalen Add [D64 9] Anemia     10/6/2022 10:17 PM Kassi Phalen Modify [N92 0] Menorrhagia     10/6/2022 10:17 PM Kassi Phalen Modify [D64 9] Anemia       ED Disposition     ED Disposition Admit    Condition   Stable    Date/Time   Thu Oct 6, 2022 10:18 PM    Comment   Case was discussed with Dr Dmitriy Lock and the patient's admission status was agreed to be Admission Status: observation status to the service of Dr Veronika Anderson   Follow-up Information     Follow up With Specialties Details Why Contact Info Additional [address] [facility] [place] [facility] Follow up Follow up with Dr Justino Patel [address] [facility] [id number] [address], [address] [place] [address], [address] [place] [address], [id number] [phone]          Patient's Medications   Discharge Prescriptions    No medications on file     No discharge procedures on file  PDMP Review     None           ED Provider  Attending physically available and evaluated [facility]  I managed the patient along with the ED Attending      Electronically Signed by

## 2022-10-07 NOTE — PLAN OF CARE
Problem: Potential for Falls  Goal: Patient will remain free of falls  Description: INTERVENTIONS:  - Educate patient/family on patient safety including physical limitations  - Instruct patient to call for assistance with activity   - Consult OT/PT to assist with strengthening/mobility   - Keep Call bell within reach  - Keep bed low and locked with side rails adjusted as appropriate  - Keep care items and personal belongings within reach  - Initiate and maintain comfort rounds  - Apply yellow socks and bracelet for high fall risk patients  - Consider moving patient to room near nurses station  Outcome: Progressing     Problem: PAIN - ADULT  Goal: Verbalizes/displays adequate comfort level or baseline comfort level  Description: Interventions:  - Encourage patient to monitor pain and request assistance  - Assess pain using appropriate pain scale  - Administer analgesics based on type and severity of pain and evaluate response  - Implement non-pharmacological measures as appropriate and evaluate response  - Consider cultural and social influences on pain and pain management  - Notify physician/advanced practitioner if interventions unsuccessful or patient reports new pain  Outcome: Progressing     Problem: INFECTION - ADULT  Goal: Absence or prevention of progression during hospitalization  Description: INTERVENTIONS:  - Assess and monitor for signs and symptoms of infection  - Monitor lab/diagnostic results  - Monitor all insertion sites, i e  indwelling lines, tubes, and drains  - Monitor endotracheal if appropriate and nasal secretions for changes in amount and color  - [place] appropriate cooling/warming therapies per order  - Administer medications as ordered  - Instruct and encourage patient and family to use good hand hygiene technique  - Identify and instruct in appropriate isolation precautions for identified infection/condition  Outcome: Progressing  Goal: Absence of fever/infection during neutropenic period  Description: INTERVENTIONS:  - Monitor WBC    Outcome: Progressing     Problem: SAFETY ADULT  Goal: Patient will remain free of falls  Description: INTERVENTIONS:  - Educate patient/family on patient safety including physical limitations  - Instruct patient to call for assistance with activity   - Consult OT/PT to assist with strengthening/mobility   - Keep Call bell within reach  - Keep bed low and locked with side rails adjusted as appropriate  - Keep care items and personal belongings within reach  - Initiate and maintain comfort rounds  - Make Fall Risk Sign visible to staff  - Apply yellow socks and bracelet for high fall risk patients  - Consider moving patient to room near nurses station  Outcome: Progressing  Goal: Maintain or return to baseline ADL function  Description: INTERVENTIONS:  -  Assess patient's ability to carry out ADLs; assess patient's baseline for ADL function and identify physical deficits which impact ability to perform ADLs (bathing, care of mouth/teeth, toileting, grooming, dressing, etc )  - Assess/evaluate cause of self-care deficits   - Assess range of motion  - Assess patient's mobility; develop plan if impaired  - Assess patient's need for assistive devices and provide as appropriate  - Encourage maximum independence but intervene and supervise when necessary  - Involve family in performance of ADLs  - Assess for home care needs following discharge   - Consider OT consult to assist with ADL evaluation and planning for discharge  - Provide patient education as appropriate  Outcome: Progressing  Goal: Maintains/Returns to pre admission functional level  Description: INTERVENTIONS:  - Perform BMAT or MOVE assessment daily    - Set and communicate daily mobility goal to care team and patient/family/caregiver     - Collaborate with rehabilitation services on mobility goals if consulted  - Out of bed for toileting  - Record patient progress and toleration of activity level Outcome: Progressing     Problem: DISCHARGE PLANNING  Goal: Discharge to home or other facility with appropriate resources  Description: INTERVENTIONS:  - Identify barriers to discharge w/patient and caregiver  - Arrange for needed discharge resources and transportation as appropriate  - Identify discharge learning needs (meds, wound care, etc )  - Arrange for interpretive services to assist at discharge as needed  - Refer to Case Management Department for coordinating discharge planning if the patient needs post-hospital services based on physician/advanced practitioner order or complex needs related to functional status, cognitive ability, or social support system  Outcome: Progressing     Problem: Knowledge Deficit  Goal: Patient/family/caregiver demonstrates understanding of disease process, treatment plan, medications, and discharge instructions  Description: Complete learning assessment and assess knowledge base    Interventions:  - Provide teaching at level of understanding  - Provide teaching via preferred learning methods  Outcome: Progressing

## 2022-10-07 NOTE — ED PROVIDER NOTES
History  Chief Complaint   Patient presents with    Medical Problem     Pt thinks her "iron is really low", pt states she felt dizzy and faint in the shower aprox 1600  Hx of anemia      71-year-old female history of iron deficiency anemia from menorrhagia presenting with fatigue and presyncope  Patient states for the past few weeks she has been very tired  She feels somewhat similar to when she had iron deficiency anemia in the past   This morning, he woke up late and still felt tired, went take a shower and felt like she was going to pass out  For the past few weeks has been having some shortness of breath that is possibly exertional in nature  Some vague chest pain which is also possibly exertional in nature  LMP was 2 weeks ago, a few days late, pretty heavy needing to change fully soaked pad every hour for the 1st 2 days  Some vague epigastric pain after spicy meal   Denies bloody or black tarry stool  Prior to Admission Medications   Prescriptions Last Dose Informant Patient Reported? Taking? Cyanocobalamin (B-12 PO)  Self Yes No   Sig: Take 1 tablet by mouth daily   Multiple Vitamins-Minerals (MULTIVITAL PO)  Self Yes No   Sig: Take 1 tablet by mouth daily   etonogestrel-ethinyl estradiol (NUVARING) 0 12-0 015 MG/24HR vaginal ring  Self No No   Sig: Insert vaginally and leave in place for 3 consecutive weeks, then remove for 1 week        Facility-Administered Medications: None       Past Medical History:   Diagnosis Date    Anemia     Herpes     Syphilis     resolved       Past Surgical History:   Procedure Laterality Date    HERNIA REPAIR      HERNIA REPAIR  [id number]    umbilical hernia repair - resolved       Family History   Problem Relation Age of Onset    Asthma Mother     Coronary artery disease Mother     Hypertension Mother     Diabetes Maternal Grandmother     Hypertension Maternal Grandmother     Stroke Maternal Grandfather         syndrome    No Known Problems Brother     No Known Problems Brother      I have reviewed and agree with the history as documented  E-Cigarette/Vaping    E-Cigarette Use Current Some Day User     Comments couple times a week      E-Cigarette/Vaping Substances    Nicotine Yes     THC Yes     CBD Yes     Flavoring No     Other No     Unknown No      Social History     Tobacco Use    Smoking status: Former Smoker     Packs/day: 0 25     Types: Cigarettes    Smokeless tobacco: Never Used   Vaping Use    Vaping Use: Some days    Substances: Nicotine, THC, CBD   Substance Use Topics    Alcohol use: Yes    Drug use: Yes     Types: Marijuana        Review of Systems   Constitutional: Positive for activity change and fatigue  Negative for fever and unexpected weight change  HENT: Negative for postnasal drip and rhinorrhea  Eyes: Negative for visual disturbance  Respiratory: Positive for shortness of breath  Negative for cough and chest tightness  Cardiovascular: Positive for chest pain  Negative for palpitations and leg swelling  Gastrointestinal: Negative for abdominal pain, blood in stool, constipation, diarrhea, nausea and vomiting  Genitourinary: Negative for dysuria and hematuria  Skin: Negative for color change and wound  Allergic/Immunologic: Negative for immunocompromised state  Neurological: Positive for weakness and light-headedness  Negative for dizziness and syncope  Hematological: Does not bruise/bleed easily  Psychiatric/Behavioral: Negative for dysphoric mood  The patient is not nervous/anxious  All other systems reviewed and are negative        Physical Exam  ED Triage Vitals [10/06/22 1902]   Temperature Pulse Respirations Blood Pressure SpO2   98 5 °F (36 9 °C) 76 16 123/58 100 %      Temp Source Heart Rate Source Patient Position - Orthostatic VS BP Location FiO2 (%)   Oral Monitor -- -- --      Pain Score       --             Orthostatic Vital Signs  Vitals:    10/06/22 1902 10/06/22 2227 10/06/22 2245 10/06/22 2338   BP: 123/58 110/52 112/56 107/66   Pulse: 76 88 87 84       Physical Exam  Vitals and nursing note reviewed  Exam conducted with a chaperone present (Female chaperone present for narendra)  Constitutional:       Appearance: She is ill-appearing  She is not toxic-appearing or diaphoretic  HENT:      Head: Normocephalic and atraumatic  Right Ear: External ear normal       Left Ear: External ear normal       Nose: Nose normal       Mouth/Throat:      Mouth: Mucous membranes are moist    Eyes:      General: No scleral icterus  Extraocular Movements: Extraocular movements intact  Conjunctiva/sclera: Conjunctivae normal    Cardiovascular:      Rate and Rhythm: Normal rate and regular rhythm  Pulses: Normal pulses  Radial pulses are 2+ on the right side  Dorsalis pedis pulses are 2+ on the right side and 2+ on the left side  Heart sounds: Normal heart sounds, S1 normal and S2 normal  No murmur heard  Pulmonary:      Effort: Pulmonary effort is normal  No respiratory distress  Breath sounds: Normal breath sounds  No stridor  No wheezing  Abdominal:      General: Bowel sounds are normal       Palpations: Abdomen is soft  Tenderness: There is no abdominal tenderness  There is no right CVA tenderness, left CVA tenderness, guarding or rebound  Genitourinary:     Rectum: Normal  Guaiac result negative  Musculoskeletal:         General: Normal range of motion  Cervical back: Normal range of motion  Right lower leg: No edema  Left lower leg: No edema  Skin:     General: Skin is warm and dry  Coloration: Skin is not jaundiced  Neurological:      General: No focal deficit present  Mental Status: She is alert and oriented to person, place, and time     Psychiatric:         Mood and Affect: Mood normal          ED Medications  Medications   Famotidine (PF) (PEPCID) injection 20 mg (20 mg Intravenous Given 10/6/22 2231)       Diagnostic Studies  Results Reviewed     Procedure Component Value Units Date/Time    LD,Blood [[id number]]  (Normal) Collected: 10/06/22 1909    Lab Status: Final result Specimen: Blood from Arm, Left Updated: 10/06/22 2124      U/L     Protime-INR [[id number]]  (Abnormal) Collected: 10/06/22 1909    Lab Status: Final result Specimen: Blood from Arm, Left Updated: 10/06/22 2115     Protime 14 8 seconds      INR 1 13    APTT [[id number]]  (Normal) Collected: 10/06/22 1909    Lab Status: Final result Specimen: Blood from Arm, Left Updated: 10/06/22 2115     PTT 29 seconds     UA w Reflex to Microscopic w Reflex to Culture [[id number]]     Lab Status: No result Specimen: Urine     POCT pregnancy, urine [[id number]]     Lab Status: No result     Chlamydia/GC amplified DNA by PCR [[id number]]     Lab Status: No result     HS Troponin 0hr (reflex protocol) [[id number]]  (Normal) Collected: 10/06/22 1909    Lab Status: Final result Specimen: Blood from Arm, Left Updated: 10/06/22 1954     hs TnI 0hr <2 ng/L     CBC and differential [[id number]]  (Abnormal) Collected: 10/06/22 1909    Lab Status: Final result Specimen: Blood from Arm, Left Updated: 10/06/22 1949     WBC 6 32 Thousand/uL      RBC 3 08 Million/uL      Hemoglobin 4 7 g/dL      Hematocrit 19 4 %      MCV 63 fL      MCH 15 3 pg      MCHC 24 2 g/dL      RDW 26 3 %      MPV 10 2 fL      Platelets 128 Thousands/uL      nRBC 1 /100 WBCs      Neutrophils Relative 63 %      Immat GRANS % 1 %      Lymphocytes Relative 25 %      Monocytes Relative 8 %      Eosinophils Relative 2 %      Basophils Relative 1 %      Neutrophils Absolute 4 01 Thousands/µL      Immature Grans Absolute 0 03 Thousand/uL      Lymphocytes Absolute 1 58 Thousands/µL      Monocytes Absolute 0 52 Thousand/µL      Eosinophils Absolute 0 11 Thousand/µL      Basophils Absolute 0 07 Thousands/µL     Narrative: This is an appended report  These results have been appended to a previously verified report  Comprehensive metabolic panel [[id number]] Collected: 10/06/22 1909    Lab Status: Final result Specimen: Blood from Arm, Left Updated: 10/06/22 1947     Sodium 138 mmol/L      Potassium 3 6 mmol/L      Chloride 107 mmol/L      CO2 24 mmol/L      ANION GAP 7 mmol/L      BUN 10 mg/dL      Creatinine 0 73 mg/dL      Glucose 115 mg/dL      Calcium 8 9 mg/dL      AST 15 U/L      ALT 8 U/L      Alkaline Phosphatase 47 U/L      Total Protein 7 2 g/dL      Albumin 4 2 g/dL      Total Bilirubin 0 39 mg/dL      eGFR 112 ml/min/1 73sq m     Narrative:      Mini guidelines for Chronic Kidney Disease (CKD):     Stage 1 with normal or high GFR (GFR > 90 mL/min/1 73 square meters)    Stage 2 Mild CKD (GFR = 60-89 mL/min/1 73 square meters)    Stage 3A Moderate CKD (GFR = 45-59 mL/min/1 73 square meters)    Stage 3B Moderate CKD (GFR = 30-44 mL/min/1 73 square meters)    Stage 4 Severe CKD (GFR = 15-29 mL/min/1 73 square meters)    Stage 5 End Stage CKD (GFR <15 mL/min/1 73 square meters)  Note: GFR calculation is accurate only with a steady state creatinine                 US pelvis complete w transvaginal    (Results Pending)         Procedures  ECG 12 Lead Documentation Only    Date/Time: 10/6/2022 7:15 PM  Performed by: Neil Zaman MD  Authorized by: Neil Zaman MD     ECG reviewed by me, the ED Provider: yes    Patient location:  ED  Previous ECG:     Previous ECG:  Compared to current    Similarity:  No change    Comparison to cardiac monitor: Yes    Interpretation:     Interpretation: normal    Rate:     ECG rate:  73    ECG rate assessment: normal    Rhythm:     Rhythm: sinus rhythm    Ectopy:     Ectopy: none    QRS:     QRS axis:  Normal    QRS intervals:  Normal  Conduction:     Conduction: normal    ST segments:     ST segments:  Normal  T waves:     T waves: normal            ED Course  ED Course as of 10/07/22 0041   Thu Oct 06, 2022   1952 Hemoglobin(!!): 4 7 2019 Platelet Count(!): 559   2019 hs TnI 0hr: <2   2019 Comprehensive metabolic panel  wnl                                       MDM  Number of Diagnoses or Management Options  Anemia: new and requires workup  Menorrhagia: established and worsening  Diagnosis management comments: CBC done at triage shows hemoglobin of 4 6  Likely from the menorrhagia vs possible early preg loss  Although she has some vague acid reflux pain, no sign of GI bleed  Will transfuse 2 units of PRBC than have patient observed overnight by OBGYN who can do any further care or evaluation for her complaints  Amount and/or Complexity of Data Reviewed  Clinical lab tests: ordered and reviewed  Tests in the medicine section of CPT®: reviewed and ordered  Decide to obtain previous medical records or to obtain history from someone other than the patient: yes  Review and summarize past medical records: yes        Disposition  Final diagnoses:   Menorrhagia   Anemia     Time reflects when diagnosis was documented in both MDM as applicable and the Disposition within this note     Time User Action Codes Description Comment    10/6/2022 10:17 PM Saralee Sear Add [N92 0] Menorrhagia     10/6/2022 10:17 PM Saralee Sear Add [D64 9] Anemia     10/6/2022 10:17 PM Saralee Sear Modify [N92 0] Menorrhagia     10/6/2022 10:17 PM Saralee Sear Modify [D64 9] Anemia       ED Disposition     ED Disposition   Admit    Condition   Stable    Date/Time   Thu Oct 6, 2022 10:18 PM    Comment   Case was discussed with Dr Nelson Jarvis and the patient's admission status was agreed to be Admission Status: observation status to the service of Dr Dana Chapa              Follow-up Information     Follow up With Specialties Details Why 2 [facility] Follow up Follow up with Dr Terra Dixon [address]  [address]  [facility], [address], [address] [place], [place], [address] [phone]          Current Discharge Medication List      CONTINUE these medications which have NOT CHANGED    Details   Cyanocobalamin (B-12 PO) Take 1 tablet by mouth daily      etonogestrel-ethinyl estradiol (NUVARING) 0 12-0 015 MG/24HR vaginal ring Insert vaginally and leave in place for 3 consecutive weeks, then remove for 1 week  Qty: 1 each, Refills: 2    Associated Diagnoses: Irregular menses; Encounter for initial prescription of vaginal ring hormonal contraceptive      Multiple Vitamins-Minerals (MULTIVITAL PO) Take 1 tablet by mouth daily           No discharge procedures on file  PDMP Review     None           ED Provider  Attending physically available and evaluated [facility]  I managed the patient along with the ED Attending      Electronically Signed by         Dionte Stanley MD  10/07/22 3127

## 2022-10-07 NOTE — H&P
H&P Exam - Gynecology   [facility] 28 y o  female MRN: [id number]  Unit/Bed#: S -12 Encounter: [id number]    Assessment: [facility] is a 28 y o  female who presents with symptomatic anemia    Plan:  Menorrhagia  Assessment & Plan  Pt has hx of menorrhagia and EUGENE  Previously on OCP's and Depo  Questionable hx of DVT (not documented but mom claims this occurred while pt was taking OCP's)  Pt needs follow up in clinic to establish care and discuss longterm options (Consider IUD outpt)    Anemia  Assessment & Plan  Hgb 4 7 on arrival in ED  Hx of multiple ED visits for anemia with Hgb between 5-6  2u PRBC  Pt not currently bleeding   Presented with fatigue, dyspnea on exertion, chest pain and pre-syncope which has since improved  Pt has hx of EUGENE, consider dose of Venofer while inpatient      Discussed case and plan w/ Dr Espinoza Deluna    History of Present Illness   HPI:  [facility] is a 28 y o  female G0 who presents with fatigue, dyspnea on exertion, chest pain, and pre syncope  This has been going on for a few weeks  Pt started her period on 9/22 and her menses was longer than normal and heavier with the passage of clots  Menses lasted 7-8 days ans was very heavy in the beginning  She states she started her menses at age 14 and menses have been regular but heavy in nature  She states she will usually go through a box of pads during her week long menses  She reports her mom also had heavy periods and had an ablation performed  Pt states that she previously was on depo provera and OCP's  She states when she came off of OCP's years ago she had very heavy bleeding afterwards  She was diagnosed with iron deficiency anemia and was following with hematology and receiving IV Venofer infusions  Her medical history is significant for possible DVT in her late teens after OCP use  Her family hx is significant for breast cancer in her maternal aunt(dx'd age 62)        Review of Systems Constitutional: Negative for chills and fever  Respiratory: Positive for shortness of breath  Negative for cough and wheezing  Cardiovascular: Positive for chest pain  Gastrointestinal: Negative for abdominal pain, nausea and vomiting  Genitourinary: Negative for dysuria, hematuria, urgency, vaginal bleeding and vaginal discharge  Neurological: Positive for light-headedness  Negative for dizziness, weakness and headaches         Historical Information   Past Medical History:   Diagnosis Date    Anemia     Herpes     Syphilis     resolved     Past Surgical History:   Procedure Laterality Date    HERNIA REPAIR      HERNIA REPAIR  3259    umbilical hernia repair - resolved     OB History    Para Term  AB Living   0 0 0 0 0 0   SAB IAB Ectopic Multiple Live Births   0 0 0 0 0     Family History   Problem Relation Age of Onset    Asthma Mother     Coronary artery disease Mother     Hypertension Mother     Diabetes Maternal Grandmother     Hypertension Maternal Grandmother     Stroke Maternal Grandfather         syndrome    No Known Problems Brother     No Known Problems Brother      Social History   Social History     Substance and Sexual Activity   Alcohol Use Yes     Social History     Substance and Sexual Activity   Drug Use Yes    Types: Marijuana     Social History     Tobacco Use   Smoking Status Former Smoker    Packs/day: 0 25    Types: Cigarettes   Smokeless Tobacco Never Used   Marijuana socially     Meds/Allergies   Medications Prior to Admission   Medication    Cyanocobalamin (B-12 PO)    etonogestrel-ethinyl estradiol (NUVARING) 0 12-0 015 MG/24HR vaginal ring    Multiple Vitamins-Minerals (MULTIVITAL PO)     No Known Allergies    Objective   /66   Pulse 84   Temp 98 8 °F (37 1 °C) (Oral)   Resp 16   LMP 2022 (Exact Date)   SpO2 98%       Intake/Output Summary (Last 24 hours) at 10/7/2022 0051  Last data filed at 10/6/2022 2339  Gross per 24 hour Intake 360 ml   Output --   Net 360 ml       Invasive Devices: Invasive Devices  Timeline    Peripheral Intravenous Line  Duration           Peripheral IV 10/06/22 Left Antecubital <1 day                Physical Exam  Vitals reviewed  Constitutional:       Appearance: Normal appearance  Cardiovascular:      Rate and Rhythm: Normal rate and regular rhythm  Pulses: Normal pulses  Heart sounds: No murmur heard  No friction rub  No gallop  Pulmonary:      Effort: Pulmonary effort is normal  No respiratory distress  Breath sounds: Normal breath sounds  No wheezing  Abdominal:      General: There is no distension  Palpations: Abdomen is soft  Tenderness: There is no abdominal tenderness  Musculoskeletal:         General: No swelling or tenderness  Skin:     General: Skin is warm and dry  Neurological:      Mental Status: She is alert and oriented to person, place, and time           Lab Results:   Admission on 10/06/2022   Component Date Value    WBC 10/06/2022 6 32     RBC 10/06/2022 3 08 (A)    Hemoglobin 10/06/2022 4 7 (A)    Hematocrit 10/06/2022 19 4 (A)    MCV 10/06/2022 63 (A)    MCH 10/06/2022 15 3 (A)    MCHC 10/06/2022 24 2 (A)    RDW 10/06/2022 26 3 (A)    MPV 10/06/2022 10 2     Platelets 97/95/7352 732 (A)    nRBC 10/06/2022 1     Neutrophils Relative 10/06/2022 63     Immat GRANS % 10/06/2022 1     Lymphocytes Relative 10/06/2022 25     Monocytes Relative 10/06/2022 8     Eosinophils Relative 10/06/2022 2     Basophils Relative 10/06/2022 1     Neutrophils Absolute 10/06/2022 4 01     Immature Grans Absolute 10/06/2022 0 03     Lymphocytes Absolute 10/06/2022 1 58     Monocytes Absolute 10/06/2022 0 52     Eosinophils Absolute 10/06/2022 0 11     Basophils Absolute 10/06/2022 0 07     Sodium 10/06/2022 138     Potassium 10/06/2022 3 6     Chloride 10/06/2022 107     CO2 10/06/2022 24     ANION GAP 10/06/2022 7     BUN 10/06/2022 10  Creatinine 10/06/2022 0 73     Glucose 10/06/2022 115     Calcium 10/06/2022 8 9     AST 10/06/2022 15     ALT 10/06/2022 8     Alkaline Phosphatase 10/06/2022 47     Total Protein 10/06/2022 7 2     Albumin 10/06/2022 4 2     Total Bilirubin 10/06/2022 0 39     eGFR 10/06/2022 112     hs TnI 0hr 10/06/2022 <2     Ventricular Rate 10/06/2022 73     Atrial Rate 10/06/2022 73     IN Interval 10/06/2022 142     QRSD Interval 10/06/2022 86     QT Interval 10/06/2022 420     QTC Interval 10/06/2022 462     P Axis 10/06/2022 69     QRS Axis 10/06/2022 74     T Wave Axis 10/06/2022 72     ABO Grouping 10/06/2022 O     Rh Factor 10/06/2022 Positive     Antibody Screen 10/06/2022 Negative     Specimen Expiration Date 10/06/2022 [id number]     Unit Product Code 10/06/2022 [id number]     Unit Number 10/06/2022 [id number]     Unit ABO 10/06/2022 O     Unit RH 10/06/2022 NEG     Crossmatch 10/06/2022 Compatible     Unit Dispense Status 10/06/2022 Crossmatched     Unit Product Volume 10/06/2022 350     Unit Product Code 10/06/2022 [id number]     Unit Number 10/06/2022 [id number]     Unit ABO 10/06/2022 O     Unit RH 10/06/2022 NEG     Crossmatch 10/06/2022 Compatible     Unit Dispense Status 10/06/2022 Issued     Unit Product Volume 10/06/2022 250     Protime 10/06/2022 14 8 (A)    INR 10/06/2022 1 13     LD 10/06/2022 193     PTT 10/06/2022 29       Imaging: I have personally reviewed pertinent reports  EKG, Pathology, and Other Studies: I have personally reviewed pertinent reports        Code Status: Full Code  Advance Directive and Living Will:      Power of :    POLST:      Timbo Campbell  10/7/2022  12:51 AM

## 2022-10-07 NOTE — ASSESSMENT & PLAN NOTE
Hgb 4 7 on arrival in ED  Hx of multiple ED visits for anemia with Hgb between 5-6  2u PRBC crossed  Pt not currently bleeding   Presented with fatigue, dyspnea on exertion, chest pain and pre-syncope, now improved  Pt has hx of EUGENE, consider dose of Venofer while inpatient    4 7 --> 1 U pRBC --> 5 5 this AM--> 1 U pRBC --> 4hr post-transfusion CBC

## 2022-10-07 NOTE — QUICK NOTE
Late entry due to pt care  Pt received one unit of blood overnight  She was due for a second unit but requested that it not be given overnight  She is due for blood work in the AM and will reassess need for second unit of blood      /66   Pulse 84   Temp 98 8 °F (37 1 °C) (Oral)   Resp 16   LMP 09/24/2022 (Exact Date)   SpO2 98%     Marshia Formica  10/07/22
Reviewed hgb trend with patient, 4 7 --> 1 U pRBC --> 5 5 this AM--> 1 U pRBC --> 7 3  Patient denies chest pain, SOB, lightheadedness/dizziness, and weakness  Discussed again short-term options for management of heavy vaginal bleeding, including POPs and Depo, prior to outpatient follow up and likely IUD insertion  Reviewed that the patient had her period 2 weeks ago and will likely have another in 2 weeks  Reiterated that she should notify us with any additional heavy bleeding, and that heavy bleeding for her could be life-threatening  We recommend very short interval follow-up in the office  Additional heme/onc referral placed      D/w Dr Lucrecia Coulter, PGY3
numerical 0-10

## 2022-10-07 NOTE — DISCHARGE INSTRUCTIONS
Ovarian Cyst   WHAT YOU NEED TO KNOW:   An ovarian cyst is a fluid-filled sac that grows in or on an ovary  You have 2 ovaries, 1 on each side of your uterus  They are small, about the shape of an almond  Ovarian cysts are common in women who have regular monthly cycles  During your monthly cycle, eggs are released from the ovaries  The cyst usually contains fluid but may sometimes have blood or tissue in it  Most ovarian cysts are harmless and go away without treatment in a few months  Some cysts can grow large, cause pain, or break open  DISCHARGE INSTRUCTIONS:   Call your local emergency number (911 in the [address] [facility]) if:   You have severe pain with fever and vomiting  You have sudden, severe abdominal pain  You are too weak, faint, or dizzy to stand up  You are breathing very quickly  Call your doctor if:   Your periods are early, late, or more painful than usual     You have questions or concerns about your condition or care  Medicines: You may need any of the following:  Birth control pills  may help control your monthly cycle, prevent cysts, or cause them to shrink  Acetaminophen  decreases pain and fever  It is available without a doctor's order  Ask how much to take and how often to take it  Follow directions  Read the labels of all other medicines you are using to see if they also contain acetaminophen, or ask your doctor or pharmacist  Acetaminophen can cause liver damage if not taken correctly  Do not use more than 4 grams (4,000 milligrams) total of acetaminophen in one day  NSAIDs , such as ibuprofen, help decrease swelling, pain, and fever  This medicine is available with or without a doctor's order  NSAIDs can cause stomach bleeding or kidney problems in certain people  If you take blood thinner medicine, always ask your healthcare provider if NSAIDs are safe for you  Always read the medicine label and follow directions  Prescription pain medicine  may be given   Ask your healthcare provider how to take this medicine safely  Some prescription pain medicines contain acetaminophen  Do not take other medicines that contain acetaminophen without talking to your healthcare provider  Too much acetaminophen may cause liver damage  Prescription pain medicine may cause constipation  Ask your healthcare provider how to prevent or treat constipation  Take your medicine as directed  Contact your healthcare provider if you think your medicine is not helping or if you have side effects  Tell him or her if you are allergic to any medicine  Keep a list of the medicines, vitamins, and herbs you take  Include the amounts, and when and why you take them  Bring the list or the pill bottles to follow-up visits  Carry your medicine list with you in case of an emergency  Manage ovarian cysts: You can manage a current cyst and help healthcare providers find future cysts early  Apply heat to decrease pain and cramping from a cyst   Sit in a warm bath, or place a heating pad (turned on low) on your abdomen  Do this for 15 to 20 minutes every hour for comfort  Get regular pelvic exams or Pap smears  This will help providers find any new ovarian cysts  Tell your healthcare provider about any unusual changes in your monthly cycle  Follow up with your doctor or gynecologist as directed:  Write down your questions so you remember to ask them during your visits  © Copyright [employer] 2022 Information is for End User's use only and may not be sold, redistributed or otherwise used for commercial purposes  All illustrations and images included in CareNotes® are the copyrighted property of [employer]  or Igor Duarte  The above information is an  only  It is not intended as medical advice for individual conditions or treatments  Talk to your doctor, nurse or pharmacist before following any medical regimen to see if it is safe and effective for you

## 2022-10-07 NOTE — ASSESSMENT & PLAN NOTE
Pt has hx of menorrhagia and EUGENE  Previously on OCP's and Depo  Questionable hx of DVT (not documented but mom claims this occurred while pt was taking OCP's)    Pt needs follow up in clinic to establish care and discuss longterm options (Consider IUD outpt)

## 2022-10-08 LAB
ABO GROUP BLD BPU: NORMAL
ABO GROUP BLD BPU: NORMAL
BPU ID: NORMAL
BPU ID: NORMAL
C TRACH DNA SPEC QL NAA+PROBE: NEGATIVE
CROSSMATCH: NORMAL
CROSSMATCH: NORMAL
N GONORRHOEA DNA SPEC QL NAA+PROBE: NEGATIVE
UNIT DISPENSE STATUS: NORMAL
UNIT DISPENSE STATUS: NORMAL
UNIT PRODUCT CODE: NORMAL
UNIT PRODUCT CODE: NORMAL
UNIT PRODUCT VOLUME: 250 ML
UNIT PRODUCT VOLUME: 350 ML
UNIT RH: NORMAL
UNIT RH: NORMAL

## 2022-10-10 ENCOUNTER — TRANSITIONAL CARE MANAGEMENT (OUTPATIENT)
Dept: FAMILY MEDICINE CLINIC | Facility: CLINIC | Age: 27
End: 2022-10-10

## 2022-10-10 ENCOUNTER — TELEPHONE (OUTPATIENT)
Dept: HEMATOLOGY ONCOLOGY | Facility: CLINIC | Age: 27
End: 2022-10-10

## 2022-10-10 DIAGNOSIS — D50.9 IRON DEFICIENCY ANEMIA, UNSPECIFIED IRON DEFICIENCY ANEMIA TYPE: Primary | ICD-10-CM

## 2022-10-10 NOTE — TELEPHONE ENCOUNTER
Scheduling Appointment SEND TO [facility]    Who Is Calling to Schedule  Patient   Doctor Dr Bernarda Colon   Date and Time 10/11 3PM   Reason for scheduling appointment F/u Iron deficiency anemia (saw Dr Amniata Araujo 2021)   Patient verbalized understanding   Yes

## 2022-10-11 ENCOUNTER — TELEPHONE (OUTPATIENT)
Dept: HEMATOLOGY ONCOLOGY | Facility: CLINIC | Age: 27
End: 2022-10-11

## 2022-10-11 ENCOUNTER — OFFICE VISIT (OUTPATIENT)
Dept: HEMATOLOGY ONCOLOGY | Facility: CLINIC | Age: 27
End: 2022-10-11
Payer: COMMERCIAL

## 2022-10-11 ENCOUNTER — APPOINTMENT (OUTPATIENT)
Dept: LAB | Facility: CLINIC | Age: 27
End: 2022-10-11
Payer: COMMERCIAL

## 2022-10-11 VITALS
RESPIRATION RATE: 16 BRPM | WEIGHT: 133.5 LBS | TEMPERATURE: 98 F | DIASTOLIC BLOOD PRESSURE: 80 MMHG | HEART RATE: 62 BPM | SYSTOLIC BLOOD PRESSURE: 130 MMHG | OXYGEN SATURATION: 97 % | BODY MASS INDEX: 20.91 KG/M2

## 2022-10-11 DIAGNOSIS — D50.0 IRON DEFICIENCY ANEMIA DUE TO CHRONIC BLOOD LOSS: ICD-10-CM

## 2022-10-11 DIAGNOSIS — D50.9 IRON DEFICIENCY ANEMIA, UNSPECIFIED IRON DEFICIENCY ANEMIA TYPE: Primary | ICD-10-CM

## 2022-10-11 DIAGNOSIS — N92.0 MENORRHAGIA WITH REGULAR CYCLE: ICD-10-CM

## 2022-10-11 DIAGNOSIS — D50.9 IRON DEFICIENCY ANEMIA, UNSPECIFIED IRON DEFICIENCY ANEMIA TYPE: ICD-10-CM

## 2022-10-11 LAB
FERRITIN SERPL-MCNC: 5 NG/ML (ref 8–388)
IRON SATN MFR SERPL: 3 % (ref 15–50)
IRON SERPL-MCNC: 13 UG/DL (ref 50–170)
TIBC SERPL-MCNC: 433 UG/DL (ref 250–450)

## 2022-10-11 PROCEDURE — 82728 ASSAY OF FERRITIN: CPT

## 2022-10-11 PROCEDURE — 83550 IRON BINDING TEST: CPT

## 2022-10-11 PROCEDURE — 36415 COLL VENOUS BLD VENIPUNCTURE: CPT

## 2022-10-11 PROCEDURE — 83540 ASSAY OF IRON: CPT

## 2022-10-11 PROCEDURE — 99214 OFFICE O/P EST MOD 30 MIN: CPT | Performed by: INTERNAL MEDICINE

## 2022-10-11 RX ORDER — SODIUM CHLORIDE 9 MG/ML
20 INJECTION, SOLUTION INTRAVENOUS ONCE
Status: CANCELLED | OUTPATIENT
Start: 2022-10-14

## 2022-10-11 NOTE — ED ATTENDING ATTESTATION
10/6/2022  Gregory FAULKNER MD, saw and evaluated the patient  I have discussed the patient with the resident/non-physician practitioner and agree with the resident's/non-physician practitioner's findings, Plan of Care, and MDM as documented in the resident's/non-physician practitioner's note, except where noted  All available labs and Radiology studies were reviewed  I was present for key portions of any procedure(s) performed by the resident/non-physician practitioner and I was immediately available to provide assistance  At this point I agree with the current assessment done in the Emergency Department  I have conducted an independent evaluation of this patient a history and physical is as follows:    27 y/o presenting to ED with anemia  H/o anemia  Has heavy periods  Presyncopal at home  Has had cp on and off for months  No sob       Agree with work up, will need transfusion, ob/gyn eval      ED Course         Critical Care Time  Procedures
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
## 2022-10-11 NOTE — TELEPHONE ENCOUNTER
While we try to accommodate patient requests, our priority is to schedule treatment according to Doctor's orders and site availability  1  Does the Provider use the intake sheet or checkout note? NO  2  What would be a preferred day of the week that would work best for your infusion appointment? TUESDAY  3  Do you prefer mornings or afternoons for your appointments? EARLY AS POSSIBLE - PATIENT GOES TO SCHOOL  4  Are there any days or dates that do not work for your schedule, including any upcoming vacations? NO  5  We are going to try our best to schedule you at the infusion center closest to your home  In the event that we are unable to what would be your next preferred infusion site or sites? 1  [place]  2  [place]  3  [place]    6  Do you have transportation to take you to all of your appointments? 7   Would you like the infusion center to draw labs from your port? (disregard if patient doesn't have a port or need labs for infusion appointment)

## 2022-10-11 NOTE — TELEPHONE ENCOUNTER
The first tuesday morning I have is not until 11/8 and it's at 8:30 am, let me know if the patient would take that appointment!

## 2022-10-12 NOTE — TELEPHONE ENCOUNTER
Good morning, I just got off the phone with patient due to [facility] not having the availability sooner for iron infusions  Patient is willing to travel for a sooner appointment at a different site  Patient will need an 8AM appointment from Mon-Wed due to school  Thanks!

## 2022-10-12 NOTE — TELEPHONE ENCOUNTER
Please schedule patient at [facility] till 11/1 due to [facility] not having the availability till 11/8, then coordinate with [facility] to schedule the rest        Thank you!

## 2022-10-12 NOTE — TELEPHONE ENCOUNTER
Left a detailed message with my teams number to return my call  Patient was made aware of [facility] first available in the morning, waiting for patient to return my call to see if she would like to schedule for date provided or schedule at another infusion site

## 2022-10-13 ENCOUNTER — OFFICE VISIT (OUTPATIENT)
Dept: OBGYN CLINIC | Facility: CLINIC | Age: 27
End: 2022-10-13

## 2022-10-13 ENCOUNTER — OFFICE VISIT (OUTPATIENT)
Dept: FAMILY MEDICINE CLINIC | Facility: CLINIC | Age: 27
End: 2022-10-13
Payer: COMMERCIAL

## 2022-10-13 VITALS
HEART RATE: 56 BPM | HEIGHT: 67 IN | BODY MASS INDEX: 20.94 KG/M2 | WEIGHT: 133.4 LBS | SYSTOLIC BLOOD PRESSURE: 104 MMHG | TEMPERATURE: 98.2 F | OXYGEN SATURATION: 100 % | DIASTOLIC BLOOD PRESSURE: 74 MMHG | RESPIRATION RATE: 16 BRPM

## 2022-10-13 VITALS
DIASTOLIC BLOOD PRESSURE: 69 MMHG | HEART RATE: 77 BPM | HEIGHT: 67 IN | WEIGHT: 133.4 LBS | SYSTOLIC BLOOD PRESSURE: 116 MMHG | BODY MASS INDEX: 20.94 KG/M2

## 2022-10-13 DIAGNOSIS — N92.0 MENORRHAGIA WITH REGULAR CYCLE: ICD-10-CM

## 2022-10-13 DIAGNOSIS — N83.201 RIGHT OVARIAN CYST: ICD-10-CM

## 2022-10-13 DIAGNOSIS — Z12.4 CERVICAL CANCER SCREENING: ICD-10-CM

## 2022-10-13 DIAGNOSIS — D50.0 IRON DEFICIENCY ANEMIA DUE TO CHRONIC BLOOD LOSS: ICD-10-CM

## 2022-10-13 DIAGNOSIS — D50.9 IRON DEFICIENCY ANEMIA, UNSPECIFIED IRON DEFICIENCY ANEMIA TYPE: Primary | ICD-10-CM

## 2022-10-13 DIAGNOSIS — N92.1 MENORRHAGIA WITH IRREGULAR CYCLE: ICD-10-CM

## 2022-10-13 DIAGNOSIS — N92.0 MENORRHAGIA WITH REGULAR CYCLE: Primary | ICD-10-CM

## 2022-10-13 DIAGNOSIS — Z09 HOSPITAL DISCHARGE FOLLOW-UP: Primary | ICD-10-CM

## 2022-10-13 LAB — SL AMB POCT URINE HCG: NEGATIVE

## 2022-10-13 PROCEDURE — 81025 URINE PREGNANCY TEST: CPT | Performed by: OBSTETRICS & GYNECOLOGY

## 2022-10-13 PROCEDURE — G0145 SCR C/V CYTO,THINLAYER,RESCR: HCPCS | Performed by: PATHOLOGY

## 2022-10-13 PROCEDURE — 1111F DSCHRG MED/CURRENT MED MERGE: CPT | Performed by: FAMILY MEDICINE

## 2022-10-13 PROCEDURE — 99496 TRANSJ CARE MGMT HIGH F2F 7D: CPT | Performed by: FAMILY MEDICINE

## 2022-10-13 PROCEDURE — G0124 SCREEN C/V THIN LAYER BY MD: HCPCS | Performed by: PATHOLOGY

## 2022-10-13 PROCEDURE — 58300 INSERT INTRAUTERINE DEVICE: CPT | Performed by: OBSTETRICS & GYNECOLOGY

## 2022-10-13 PROCEDURE — 99213 OFFICE O/P EST LOW 20 MIN: CPT | Performed by: OBSTETRICS & GYNECOLOGY

## 2022-10-13 RX ORDER — SODIUM CHLORIDE 9 MG/ML
20 INJECTION, SOLUTION INTRAVENOUS ONCE
Status: CANCELLED | OUTPATIENT
Start: 2022-10-17

## 2022-10-13 NOTE — PROGRESS NOTES
Chief Complaint   Patient presents with   • Transition of Care Management     Discharged 10/07/22  Health Maintenance   Topic Date Due   • Hepatitis C Screening  Never done   • COVID-19 Vaccine (1) Never done   • HIV Screening  Never done   • Annual Physical  Never done   • Cervical Cancer Screening  Never done   • Influenza Vaccine (1) Never done   • Depression Screening  10/13/2023   • BMI: Adult  10/13/2023   • DTaP,Tdap,and Td Vaccines (7 - Td or Tdap) 08/22/2028   • HIB Vaccine  Completed   • Hepatitis B Vaccine  Completed   • IPV Vaccine  Completed   • HPV Vaccine  Completed   • Pneumococcal Vaccine: Pediatrics (0 to 5 Years) and At-Risk Patients (6 to 59 Years)  Aged Out   • Hepatitis A Vaccine  Aged Out   • Meningococcal ACWY Vaccine  Aged Out     Assessment & Plan     1  Hospital discharge follow-up  Comments:  Reviewed hospital records  2  Menorrhagia with irregular cycle  Assessment & Plan:  FU OBGYN  Got mirena IUD this morning  Will check TSH  Orders:  -     TSH, 3rd generation with Free T4 reflex; Future    3  Iron deficiency anemia due to chronic blood loss  Assessment & Plan:  FU hem/onc  Plan to get IV iron weekly for 8 weeks  Depression Screening and Follow-up Plan: Patient was screened for depression during today's encounter  They screened negative with a PHQ-2 score of 0  Subjective     Transitional Care Management Review:   German Pittman is a 28 y o  female here for TCM follow up  During the TCM phone call patient stated:  TCM Call     Date and time call was made  10/10/2022  1:31 PM    Patient was hospitialized at  [address] [place] [address]    Date of Admission  10/06/22    Date of discharge  10/07/22    Diagnosis  Anemia    Disposition  Home    Were the patients medications reviewed and updated  No    Current Symptoms  None      TCM Call     Post hospital issues  None    Should patient be enrolled in anticoag monitoring? No    Scheduled for follow up?   Yes Did you obtain your prescribed medications  Yes    Do you need help managing your prescriptions or medications  No    Is transportation to your appointment needed  No    I have advised the patient to call PCP with any new or worsening symptoms  [address] [place] [employer] members    Support System  Family    Do you have social support  Yes, as much as I need    Are you recieving any outpatient services  No    Are you recieving home care services  No    Are you using any community resources  No    Current waiver services  No    Have you fallen in the last 12 months  No    Interperter language line needed  No    Counseling  Patient        HPI     Pt is here by herself  Was in hospital 10/6-10/7/2022 for anemia  Pt had heavy menses for a while  Hg was 4 7 on admission  Got 2u PRBC  Hg was 7 3 on discharge  Discharged home  FU OBGYN  Got Mirena IUD this morning  FU hem/onc  Plan to get venofer weekly for 8 weeks again  Pt feels ok today  Deneis fever  Denies dizzy, Sob, CP, n/v/diarrhea  Mother has thyroid disease  Review of Systems   Constitutional: Negative for appetite change, chills and fever  HENT: Negative for congestion, ear pain, sinus pain and sore throat  Eyes: Negative for discharge and itching  Respiratory: Negative for apnea, cough, chest tightness, shortness of breath and wheezing  Cardiovascular: Negative for chest pain, palpitations and leg swelling  Gastrointestinal: Negative for abdominal pain, anal bleeding, constipation, diarrhea, nausea and vomiting  Endocrine: Negative for cold intolerance, heat intolerance and polyuria  Genitourinary: Negative for difficulty urinating and dysuria  Musculoskeletal: Negative for arthralgias, back pain and myalgias  Skin: Negative for rash  Neurological: Negative for dizziness and headaches  Psychiatric/Behavioral: Negative for agitation         Objective     /74 (BP Location: Left arm, Patient Position: Sitting, Cuff Size: Adult)   Pulse 56   Temp 98 2 °F (36 8 °C) (Tympanic)   Resp 16   Ht 5' 7" (1 702 m)   Wt 60 5 kg (133 lb 6 4 oz)   LMP 09/24/2022 (Exact Date)   SpO2 100%   BMI 20 89 kg/m²      Physical Exam  Vitals reviewed  Constitutional:       Appearance: Normal appearance  HENT:      Head: Normocephalic and atraumatic  Eyes:      General:         Right eye: No discharge  Left eye: No discharge  Conjunctiva/sclera: Conjunctivae normal    Neck:      Vascular: No carotid bruit  Cardiovascular:      Rate and Rhythm: Normal rate and regular rhythm  Heart sounds: Normal heart sounds  No murmur heard  No friction rub  No gallop  Pulmonary:      Effort: Pulmonary effort is normal  No respiratory distress  Breath sounds: Normal breath sounds  No wheezing or rales  Abdominal:      General: Bowel sounds are normal  There is no distension  Palpations: Abdomen is soft  Tenderness: There is no abdominal tenderness  Musculoskeletal:         General: No swelling, tenderness or deformity  Normal range of motion  Cervical back: Normal range of motion and neck supple  No muscular tenderness  Lymphadenopathy:      Cervical: No cervical adenopathy  Neurological:      Mental Status: She is alert     Psychiatric:         Mood and Affect: Mood normal        Rod Matt MD

## 2022-10-13 NOTE — PROGRESS NOTES
Subjective:     Xiao Boo is a 28 y o  [place] female who presents for ER follow up for menorrhagia and anemia  She presented to the ED with symptomatic anemia with a Hgb of 4 7 and received 2u PRBC  She was not bleeding when she presented to the ED but had recently finished a heavy period that lasted 7-8 days  She has always had a heavy period and has previously been on birth control to attempt to control this using OCP's and depo  She is currently not on any birth control  She has a hx of DVT thought to be provoked by OCP use as a teenager  She follow with hematology for iron deficiency anemia for which she is receiving Venofer infusions  She had a pelvic ultrasound performed in the ED which was unremarkable except for a benign right ovarian cyst     Objective:    Vitals: Last menstrual period 09/24/2022, not currently breastfeeding  There is no height or weight on file to calculate BMI  Physical Exam  Vitals reviewed  Exam conducted with a chaperone present  Constitutional:       Appearance: Normal appearance  Cardiovascular:      Rate and Rhythm: Normal rate and regular rhythm  Pulmonary:      Effort: Pulmonary effort is normal  No respiratory distress  Abdominal:      General: There is no distension  Palpations: Abdomen is soft  Genitourinary:     Labia:         Right: No rash, tenderness or lesion  Left: No rash, tenderness or lesion  Vagina: No vaginal discharge, erythema, tenderness or bleeding  Cervix: No cervical motion tenderness, discharge, friability or lesion  Uterus: Normal        Adnexa: Right adnexa normal and left adnexa normal    Neurological:      Mental Status: She is alert  Iud insertions    Date/Time: 10/13/2022 10:12 AM  Performed by: Sis Alford MD  Authorized by:  Sis Alford MD     Other Assisting Provider: Yes (comment)    Verbal consent obtained?: Yes    Written consent obtained?: Yes    Risks and benefits: Risks, benefits and alternatives were discussed    Consent given by:  Patient  Patient states understanding of procedure being performed: Yes    Patient's understanding of procedure matches consent: Yes    Procedure consent matches procedure scheduled: Yes    Relevant documents present and verified: Yes    Test results available and properly labeled: Yes    Site marked: Yes    Radiology Images displayed and confirmed  If images not available, report reviewed: Yes    Patient identity confirmed:  Verbally with patient  Procedure:     Pelvic exam performed: yes      Negative GC/chlamydia test: yes      Negative urine pregnancy test: yes      Cervix cleaned and prepped: yes      Speculum placed in vagina: yes      Uterus sounded: yes      Uterus sound depth (cm):  9    IUD inserted with no complications: yes      IUD type:  Mirena    Strings trimmed: yes    Post-procedure:     Patient tolerated procedure well: yes      Patient will follow up after next period: yes          Assessment/Plan:  Menorrhagia  Pap smear collected at this visit  Treatment options were discussed with the patient to manage her menorrhagia as it has been contributing to her severe anemia  In the setting of a hx of DVT she does not qualify for estrogen hormone therapy  We discussed the risks and benefits of progesterone only options including, Micronor, Depo-Provera, Nexplanon, and Mirena IUD  After discussion pt opted to proceed with Mirena IUD that was placed without difficult  Will plan to follow up in 1 month for string check  Right ovarian cyst  Ultrasound showed ORADS-2 4 9cm ovarian cyst with recommended follow-up in 8-12 weeks  Pt given lab slip for repeat pelvic ultrasound in January 2023        Elizabeth Keith  10/13/2022  9:26 AM

## 2022-10-17 ENCOUNTER — HOSPITAL ENCOUNTER (OUTPATIENT)
Dept: INFUSION CENTER | Facility: HOSPITAL | Age: 27
Discharge: HOME | End: 2022-10-17
Attending: INTERNAL MEDICINE
Payer: COMMERCIAL

## 2022-10-17 VITALS
TEMPERATURE: 97.6 F | OXYGEN SATURATION: 98 % | SYSTOLIC BLOOD PRESSURE: 107 MMHG | DIASTOLIC BLOOD PRESSURE: 72 MMHG | HEART RATE: 76 BPM

## 2022-10-17 DIAGNOSIS — D50.9 IRON DEFICIENCY ANEMIA, UNSPECIFIED IRON DEFICIENCY ANEMIA TYPE: ICD-10-CM

## 2022-10-17 DIAGNOSIS — N92.0 MENORRHAGIA WITH REGULAR CYCLE: Primary | ICD-10-CM

## 2022-10-17 PROCEDURE — 96365 THER/PROPH/DIAG IV INF INIT: CPT

## 2022-10-17 RX ORDER — SODIUM CHLORIDE 9 MG/ML
20 INJECTION, SOLUTION INTRAVENOUS ONCE
Status: CANCELLED | OUTPATIENT
Start: 2022-10-20

## 2022-10-17 RX ORDER — SODIUM CHLORIDE 9 MG/ML
20 INJECTION, SOLUTION INTRAVENOUS ONCE
Status: COMPLETED | OUTPATIENT
Start: 2022-10-17 | End: 2022-10-17

## 2022-10-17 RX ADMIN — SODIUM CHLORIDE 20 ML/HR: 0.9 INJECTION, SOLUTION INTRAVENOUS at 09:18

## 2022-10-17 RX ADMIN — IRON SUCROSE 300 MG: 20 INJECTION, SOLUTION INTRAVENOUS at 09:19

## 2022-10-20 ENCOUNTER — TELEPHONE (OUTPATIENT)
Dept: OBGYN CLINIC | Facility: CLINIC | Age: 27
End: 2022-10-20

## 2022-10-20 NOTE — TELEPHONE ENCOUNTER
Called PT and explained Dr stated results are not in yet but when they are Dr or staff will contact PT

## 2022-10-21 ENCOUNTER — TELEPHONE (OUTPATIENT)
Dept: LABOR AND DELIVERY | Facility: HOSPITAL | Age: 27
End: 2022-10-21

## 2022-10-21 LAB
LAB AP GYN PRIMARY INTERPRETATION: NORMAL
Lab: NORMAL
PATH INTERP SPEC-IMP: NORMAL

## 2022-10-21 NOTE — TELEPHONE ENCOUNTER
Attempted to call pt regarding pap smear result  Left voicemail  If she calls back inform her that pap smear was normal and she will need a repeat in 3 years       Milind Luciano  10/21/22

## 2022-10-25 ENCOUNTER — HOSPITAL ENCOUNTER (OUTPATIENT)
Dept: INFUSION CENTER | Facility: HOSPITAL | Age: 27
Discharge: HOME | End: 2022-10-25
Attending: INTERNAL MEDICINE
Payer: COMMERCIAL

## 2022-10-25 VITALS
TEMPERATURE: 98.5 F | RESPIRATION RATE: 16 BRPM | SYSTOLIC BLOOD PRESSURE: 121 MMHG | HEART RATE: 72 BPM | DIASTOLIC BLOOD PRESSURE: 58 MMHG | OXYGEN SATURATION: 100 %

## 2022-10-25 DIAGNOSIS — N92.0 MENORRHAGIA WITH REGULAR CYCLE: Primary | ICD-10-CM

## 2022-10-25 DIAGNOSIS — D50.9 IRON DEFICIENCY ANEMIA, UNSPECIFIED IRON DEFICIENCY ANEMIA TYPE: ICD-10-CM

## 2022-10-25 RX ORDER — SODIUM CHLORIDE 9 MG/ML
20 INJECTION, SOLUTION INTRAVENOUS ONCE
Status: COMPLETED | OUTPATIENT
Start: 2022-10-25 | End: 2022-10-25

## 2022-10-25 RX ORDER — SODIUM CHLORIDE 9 MG/ML
20 INJECTION, SOLUTION INTRAVENOUS ONCE
Status: CANCELLED | OUTPATIENT
Start: 2022-10-31

## 2022-10-25 RX ADMIN — SODIUM CHLORIDE 20 ML/HR: 0.9 INJECTION, SOLUTION INTRAVENOUS at 08:55

## 2022-10-25 RX ADMIN — IRON SUCROSE 300 MG: 20 INJECTION, SOLUTION INTRAVENOUS at 09:10

## 2022-10-25 NOTE — PROGRESS NOTES
Pt tolerated venofer without difficulty  No s/s reaction noted  Next appt confirmed    Left ambulatory declining AVS

## 2022-11-01 DIAGNOSIS — N92.0 MENORRHAGIA WITH REGULAR CYCLE: ICD-10-CM

## 2022-11-01 DIAGNOSIS — D50.9 IRON DEFICIENCY ANEMIA, UNSPECIFIED IRON DEFICIENCY ANEMIA TYPE: Primary | ICD-10-CM

## 2022-11-01 RX ORDER — SODIUM CHLORIDE 9 MG/ML
20 INJECTION, SOLUTION INTRAVENOUS ONCE
Status: CANCELLED | OUTPATIENT
Start: 2022-11-03

## 2022-11-03 ENCOUNTER — HOSPITAL ENCOUNTER (OUTPATIENT)
Dept: INFUSION CENTER | Facility: HOSPITAL | Age: 27
End: 2022-11-03
Attending: INTERNAL MEDICINE

## 2022-11-03 VITALS
SYSTOLIC BLOOD PRESSURE: 107 MMHG | HEART RATE: 72 BPM | RESPIRATION RATE: 18 BRPM | DIASTOLIC BLOOD PRESSURE: 64 MMHG | TEMPERATURE: 97.1 F | OXYGEN SATURATION: 100 %

## 2022-11-03 DIAGNOSIS — N92.0 MENORRHAGIA WITH REGULAR CYCLE: Primary | ICD-10-CM

## 2022-11-03 DIAGNOSIS — D50.9 IRON DEFICIENCY ANEMIA, UNSPECIFIED IRON DEFICIENCY ANEMIA TYPE: ICD-10-CM

## 2022-11-03 RX ORDER — SODIUM CHLORIDE 9 MG/ML
20 INJECTION, SOLUTION INTRAVENOUS ONCE
Status: CANCELLED | OUTPATIENT
Start: 2022-11-10

## 2022-11-03 RX ORDER — SODIUM CHLORIDE 9 MG/ML
20 INJECTION, SOLUTION INTRAVENOUS ONCE
Status: COMPLETED | OUTPATIENT
Start: 2022-11-03 | End: 2022-11-03

## 2022-11-03 RX ADMIN — IRON SUCROSE 300 MG: 20 INJECTION, SOLUTION INTRAVENOUS at 13:50

## 2022-11-03 RX ADMIN — SODIUM CHLORIDE 20 ML/HR: 0.9 INJECTION, SOLUTION INTRAVENOUS at 13:48

## 2022-11-15 DIAGNOSIS — N92.0 MENORRHAGIA WITH REGULAR CYCLE: ICD-10-CM

## 2022-11-15 DIAGNOSIS — D50.9 IRON DEFICIENCY ANEMIA, UNSPECIFIED IRON DEFICIENCY ANEMIA TYPE: Primary | ICD-10-CM

## 2022-11-15 RX ORDER — SODIUM CHLORIDE 9 MG/ML
20 INJECTION, SOLUTION INTRAVENOUS ONCE
Status: CANCELLED | OUTPATIENT
Start: 2022-11-17

## 2022-11-17 ENCOUNTER — OFFICE VISIT (OUTPATIENT)
Dept: OBGYN CLINIC | Facility: CLINIC | Age: 27
End: 2022-11-17

## 2022-11-17 ENCOUNTER — HOSPITAL ENCOUNTER (OUTPATIENT)
Dept: INFUSION CENTER | Facility: HOSPITAL | Age: 27
End: 2022-11-17
Attending: INTERNAL MEDICINE

## 2022-11-17 VITALS
OXYGEN SATURATION: 97 % | RESPIRATION RATE: 17 BRPM | SYSTOLIC BLOOD PRESSURE: 108 MMHG | TEMPERATURE: 96.3 F | DIASTOLIC BLOOD PRESSURE: 55 MMHG | HEART RATE: 74 BPM

## 2022-11-17 VITALS
BODY MASS INDEX: 23.02 KG/M2 | DIASTOLIC BLOOD PRESSURE: 59 MMHG | HEART RATE: 57 BPM | WEIGHT: 147 LBS | SYSTOLIC BLOOD PRESSURE: 115 MMHG

## 2022-11-17 DIAGNOSIS — D50.9 IRON DEFICIENCY ANEMIA, UNSPECIFIED IRON DEFICIENCY ANEMIA TYPE: Primary | ICD-10-CM

## 2022-11-17 DIAGNOSIS — D50.9 IRON DEFICIENCY ANEMIA, UNSPECIFIED IRON DEFICIENCY ANEMIA TYPE: ICD-10-CM

## 2022-11-17 DIAGNOSIS — N92.0 MENORRHAGIA WITH REGULAR CYCLE: Primary | ICD-10-CM

## 2022-11-17 DIAGNOSIS — Z30.431 IUD CHECK UP: Primary | ICD-10-CM

## 2022-11-17 DIAGNOSIS — N92.0 MENORRHAGIA WITH REGULAR CYCLE: ICD-10-CM

## 2022-11-17 RX ORDER — SODIUM CHLORIDE 9 MG/ML
20 INJECTION, SOLUTION INTRAVENOUS ONCE
Status: CANCELLED | OUTPATIENT
Start: 2022-11-24

## 2022-11-17 RX ORDER — SODIUM CHLORIDE 9 MG/ML
20 INJECTION, SOLUTION INTRAVENOUS ONCE
Status: COMPLETED | OUTPATIENT
Start: 2022-11-17 | End: 2022-11-17

## 2022-11-17 RX ADMIN — IRON SUCROSE 300 MG: 20 INJECTION, SOLUTION INTRAVENOUS at 13:53

## 2022-11-17 RX ADMIN — SODIUM CHLORIDE 20 ML/HR: 0.9 INJECTION, SOLUTION INTRAVENOUS at 13:52

## 2022-11-17 NOTE — PROGRESS NOTES
Subjective:     Jocelyn Rodarte is a 28 y o  [place] female who presents for IUD string check  IUD was placed on 10/21 for heavy menses requiring blood transfusions  Objective:    Vitals: not currently breastfeeding  There is no height or weight on file to calculate BMI  Physical Exam  Exam conducted with a chaperone present  Genitourinary:     Labia:         Right: No rash, tenderness or lesion  Left: No rash, tenderness or lesion  Vagina: Bleeding present  Cervix: No cervical motion tenderness, discharge, friability or erythema  Comments: IUD string visualized at the os  Pt experiencing spotting        Assessment/Plan:  IUD strings visualized  Pt reports daily spotting and occasional light bleeding but no heavy bleeding  Discussed spotting can last up to 3-6 months but will likely resolve after her next cycle  In terms of cramping can take high dose motrin when experiencing severe cramps, this will likely resolve in the next few weeks  Can follow up in 3-6 months or sooner if needed             Shae Plascencia MD  11/17/2022  10:46 AM

## 2022-11-17 NOTE — PROGRESS NOTES
Patient tolerated IV venofer without issue  Patient L hand at IV site slightly swollen, patient states it happens when she gets IVs and it is OK, refused intervention  Patient denies pain at site  Next appointment confirmed, AVS declined

## 2022-11-22 DIAGNOSIS — N92.0 MENORRHAGIA WITH REGULAR CYCLE: ICD-10-CM

## 2022-11-22 DIAGNOSIS — D50.9 IRON DEFICIENCY ANEMIA, UNSPECIFIED IRON DEFICIENCY ANEMIA TYPE: Primary | ICD-10-CM

## 2022-11-22 RX ORDER — SODIUM CHLORIDE 9 MG/ML
20 INJECTION, SOLUTION INTRAVENOUS ONCE
Status: CANCELLED | OUTPATIENT
Start: 2022-11-29

## 2022-11-29 ENCOUNTER — HOSPITAL ENCOUNTER (OUTPATIENT)
Dept: INFUSION CENTER | Facility: HOSPITAL | Age: 27
Discharge: HOME | End: 2022-11-29
Attending: INTERNAL MEDICINE

## 2022-11-29 VITALS
SYSTOLIC BLOOD PRESSURE: 116 MMHG | TEMPERATURE: 96.1 F | OXYGEN SATURATION: 98 % | DIASTOLIC BLOOD PRESSURE: 56 MMHG | HEART RATE: 69 BPM | RESPIRATION RATE: 18 BRPM

## 2022-11-29 DIAGNOSIS — D50.9 IRON DEFICIENCY ANEMIA, UNSPECIFIED IRON DEFICIENCY ANEMIA TYPE: Primary | ICD-10-CM

## 2022-11-29 DIAGNOSIS — N92.0 MENORRHAGIA WITH REGULAR CYCLE: ICD-10-CM

## 2022-11-29 RX ORDER — SODIUM CHLORIDE 9 MG/ML
20 INJECTION, SOLUTION INTRAVENOUS ONCE
Status: COMPLETED | OUTPATIENT
Start: 2022-11-29 | End: 2022-11-29

## 2022-11-29 RX ORDER — SODIUM CHLORIDE 9 MG/ML
20 INJECTION, SOLUTION INTRAVENOUS ONCE
OUTPATIENT
Start: 2022-12-06

## 2022-11-29 RX ADMIN — SODIUM CHLORIDE 20 ML/HR: 0.9 INJECTION, SOLUTION INTRAVENOUS at 13:31

## 2022-11-29 RX ADMIN — IRON SUCROSE 300 MG: 20 INJECTION, SOLUTION INTRAVENOUS at 13:31

## 2022-11-29 NOTE — PROGRESS NOTES
Pt with venofer infiltrate to RFA approx one hour into 90 min venofer dose  Large swollen area noted around RFA PIV  Pt denies pain at the area  PIV removed and ice pack applied  New IV site attempted without success by another RN  Pt does not wish to continue with tx today  Declining further IV sticks  Next appt confirmed  AVS declined  Left ambulatory in stable condition

## 2022-12-07 ENCOUNTER — HOSPITAL ENCOUNTER (OUTPATIENT)
Dept: RADIOLOGY | Facility: HOSPITAL | Age: 27
Discharge: HOME | End: 2022-12-07

## 2022-12-07 DIAGNOSIS — N83.201 RIGHT OVARIAN CYST: ICD-10-CM

## 2022-12-09 ENCOUNTER — TELEPHONE (OUTPATIENT)
Dept: LABOR AND DELIVERY | Facility: HOSPITAL | Age: 27
End: 2022-12-09

## 2022-12-12 ENCOUNTER — TELEPHONE (OUTPATIENT)
Dept: OBGYN CLINIC | Facility: CLINIC | Age: 27
End: 2022-12-12

## 2022-12-12 DIAGNOSIS — N92.0 MENORRHAGIA WITH REGULAR CYCLE: ICD-10-CM

## 2022-12-12 DIAGNOSIS — D50.9 IRON DEFICIENCY ANEMIA, UNSPECIFIED IRON DEFICIENCY ANEMIA TYPE: Primary | ICD-10-CM

## 2022-12-12 RX ORDER — SODIUM CHLORIDE 9 MG/ML
20 INJECTION, SOLUTION INTRAVENOUS ONCE
Status: CANCELLED | OUTPATIENT
Start: 2022-12-14

## 2022-12-12 NOTE — TELEPHONE ENCOUNTER
----- Message from Yana Seals MD sent at 12/9/2022  3:06 PM EST -----  Hello! I tried to call the patient but she didn't answer; could someone please call her to let her know her recent U/S showed:  1) Resolution of her right ovarian cyst and   2) Her IUD is in correct place    Please instruct her to call back with questions  Thanks!

## 2022-12-14 ENCOUNTER — HOSPITAL ENCOUNTER (OUTPATIENT)
Dept: INFUSION CENTER | Facility: HOSPITAL | Age: 27
Discharge: HOME | End: 2022-12-14
Attending: INTERNAL MEDICINE

## 2022-12-14 VITALS
HEART RATE: 75 BPM | RESPIRATION RATE: 16 BRPM | TEMPERATURE: 96.8 F | SYSTOLIC BLOOD PRESSURE: 125 MMHG | DIASTOLIC BLOOD PRESSURE: 81 MMHG

## 2022-12-14 DIAGNOSIS — D50.9 IRON DEFICIENCY ANEMIA, UNSPECIFIED IRON DEFICIENCY ANEMIA TYPE: Primary | ICD-10-CM

## 2022-12-14 DIAGNOSIS — N92.0 MENORRHAGIA WITH REGULAR CYCLE: ICD-10-CM

## 2022-12-14 RX ORDER — SODIUM CHLORIDE 9 MG/ML
20 INJECTION, SOLUTION INTRAVENOUS ONCE
Status: COMPLETED | OUTPATIENT
Start: 2022-12-14 | End: 2022-12-14

## 2022-12-14 RX ORDER — SODIUM CHLORIDE 9 MG/ML
20 INJECTION, SOLUTION INTRAVENOUS ONCE
Status: CANCELLED | OUTPATIENT
Start: 2022-12-21

## 2022-12-14 RX ADMIN — SODIUM CHLORIDE 20 ML/HR: 0.9 INJECTION, SOLUTION INTRAVENOUS at 11:38

## 2022-12-14 RX ADMIN — IRON SUCROSE 300 MG: 20 INJECTION, SOLUTION INTRAVENOUS at 11:50

## 2022-12-21 ENCOUNTER — HOSPITAL ENCOUNTER (OUTPATIENT)
Dept: INFUSION CENTER | Facility: HOSPITAL | Age: 27
Discharge: HOME | End: 2022-12-21
Attending: INTERNAL MEDICINE

## 2022-12-21 VITALS
RESPIRATION RATE: 18 BRPM | DIASTOLIC BLOOD PRESSURE: 79 MMHG | SYSTOLIC BLOOD PRESSURE: 116 MMHG | HEART RATE: 77 BPM | TEMPERATURE: 97.9 F

## 2022-12-21 DIAGNOSIS — D50.9 IRON DEFICIENCY ANEMIA, UNSPECIFIED IRON DEFICIENCY ANEMIA TYPE: ICD-10-CM

## 2022-12-21 DIAGNOSIS — N92.0 MENORRHAGIA WITH REGULAR CYCLE: Primary | ICD-10-CM

## 2022-12-21 RX ORDER — SODIUM CHLORIDE 9 MG/ML
20 INJECTION, SOLUTION INTRAVENOUS ONCE
OUTPATIENT
Start: 2022-12-28

## 2022-12-21 RX ORDER — SODIUM CHLORIDE 9 MG/ML
20 INJECTION, SOLUTION INTRAVENOUS ONCE
Status: COMPLETED | OUTPATIENT
Start: 2022-12-21 | End: 2022-12-21

## 2022-12-21 RX ADMIN — IRON SUCROSE 300 MG: 20 INJECTION, SOLUTION INTRAVENOUS at 11:36

## 2022-12-21 RX ADMIN — SODIUM CHLORIDE 20 ML/HR: 0.9 INJECTION, SOLUTION INTRAVENOUS at 11:26

## 2023-01-13 DIAGNOSIS — N92.0 MENORRHAGIA WITH REGULAR CYCLE: ICD-10-CM

## 2023-01-13 DIAGNOSIS — D50.9 IRON DEFICIENCY ANEMIA, UNSPECIFIED IRON DEFICIENCY ANEMIA TYPE: Primary | ICD-10-CM

## 2023-01-13 RX ORDER — SODIUM CHLORIDE 9 MG/ML
20 INJECTION, SOLUTION INTRAVENOUS ONCE
Status: CANCELLED | OUTPATIENT
Start: 2023-01-17

## 2023-01-17 ENCOUNTER — HOSPITAL ENCOUNTER (OUTPATIENT)
Dept: INFUSION CENTER | Facility: HOSPITAL | Age: 28
End: 2023-01-17
Attending: INTERNAL MEDICINE

## 2023-01-18 ENCOUNTER — HOSPITAL ENCOUNTER (OUTPATIENT)
Dept: INFUSION CENTER | Facility: HOSPITAL | Age: 28
Discharge: HOME | End: 2023-01-18
Attending: INTERNAL MEDICINE

## 2023-01-18 VITALS
TEMPERATURE: 97.6 F | DIASTOLIC BLOOD PRESSURE: 81 MMHG | SYSTOLIC BLOOD PRESSURE: 115 MMHG | RESPIRATION RATE: 18 BRPM | HEART RATE: 76 BPM

## 2023-01-18 DIAGNOSIS — N92.0 MENORRHAGIA WITH REGULAR CYCLE: Primary | ICD-10-CM

## 2023-01-18 DIAGNOSIS — D50.9 IRON DEFICIENCY ANEMIA, UNSPECIFIED IRON DEFICIENCY ANEMIA TYPE: ICD-10-CM

## 2023-01-18 RX ORDER — SODIUM CHLORIDE 9 MG/ML
20 INJECTION, SOLUTION INTRAVENOUS ONCE
Status: CANCELLED | OUTPATIENT
Start: 2023-01-24

## 2023-01-18 RX ORDER — SODIUM CHLORIDE 9 MG/ML
20 INJECTION, SOLUTION INTRAVENOUS ONCE
Status: COMPLETED | OUTPATIENT
Start: 2023-01-18 | End: 2023-01-18

## 2023-01-18 RX ADMIN — IRON SUCROSE 300 MG: 20 INJECTION, SOLUTION INTRAVENOUS at 12:00

## 2023-01-18 RX ADMIN — SODIUM CHLORIDE 20 ML/HR: 0.9 INJECTION, SOLUTION INTRAVENOUS at 11:56

## 2023-03-31 ENCOUNTER — TELEPHONE (OUTPATIENT)
Dept: HEMATOLOGY ONCOLOGY | Facility: CLINIC | Age: 28
End: 2023-03-31

## 2023-04-05 ENCOUNTER — TELEPHONE (OUTPATIENT)
Dept: SURGICAL ONCOLOGY | Facility: CLINIC | Age: 28
End: 2023-04-05

## 2023-04-07 ENCOUNTER — TELEPHONE (OUTPATIENT)
Dept: HEMATOLOGY ONCOLOGY | Facility: CLINIC | Age: 28
End: 2023-04-07

## 2023-04-07 NOTE — TELEPHONE ENCOUNTER
Appointment Change  Cancel, Reschedule, Change to Virtual      Who are you speaking with? Patient   If it is not the patient, are they listed on an active communication consent form? N/A   Which provider is the appointment scheduled with? Cristobal Liu PA-C   When is the appointment scheduled? Please list date and time  04/05/2023 @1PM    At which location is the appointment scheduled to take place? [facility]   Was the appointment rescheduled or changed from an in person visit to a virtual visit? If so, please list the details of the change  Yes, 05/17/2023 @12PM    What is the reason for the appointment change? No reason was given, no show for her previous appointment  Was [facility] transport scheduled for this visit? No   Does [facility] transport need to be scheduled for the new visit (if applicable) No   Does the patient need an infusion appointment rescheduled? No   Does the patient have an infusion appointment scheduled? If so, when? No   Is the patient undergoing chemotherapy? No   Was the no-show policy reviewed for appointments being changed with less then 24 hours of notice?  Yes

## 2023-04-27 ENCOUNTER — OFFICE VISIT (OUTPATIENT)
Dept: OBGYN CLINIC | Facility: CLINIC | Age: 28
End: 2023-04-27

## 2023-04-27 VITALS
BODY MASS INDEX: 22.66 KG/M2 | HEART RATE: 58 BPM | WEIGHT: 144.4 LBS | DIASTOLIC BLOOD PRESSURE: 46 MMHG | HEIGHT: 67 IN | SYSTOLIC BLOOD PRESSURE: 111 MMHG

## 2023-04-27 DIAGNOSIS — N92.0 MENORRHAGIA WITH REGULAR CYCLE: Primary | ICD-10-CM

## 2023-04-27 NOTE — PROGRESS NOTES
"Subjective:     Humberto Martines is a 33 y o  [place] female who presents for follow up regarding heavy menstrual bleeding  She has an IUD placed in October 2022 in the setting of heavy menses and EUGENE requiring blood transfusion  She has a questionable hx of DVT as a teenage while on OCPs  She reports that her menses are regular lasting 6-7 days however the first 3 days are very heavy requiring changing of her tampon and pad every hour with episodes of soaking through her clothes  Objective:    Vitals: Blood pressure (!) 111/46, pulse 58, height 5' 7\" (1 702 m), weight 65 5 kg (144 lb 6 4 oz), last menstrual period 04/06/2023, not currently breastfeeding  Body mass index is 22 62 kg/m²  Physical Exam  Vitals reviewed  Constitutional:       Appearance: Normal appearance  Cardiovascular:      Rate and Rhythm: Normal rate  Pulmonary:      Effort: Pulmonary effort is normal    Skin:     General: Skin is warm and dry  Neurological:      Mental Status: She is alert and oriented to person, place, and time  Assessment/Plan:    Heavy menstrual cycles  Discussed with pt that progesterone may be thinning her lining too much  Discussed with pt that she should see her Hematologist to discuss possible use of estrogen and tranexamic acid to help lighten her periods  We also discussed performing a hysteroscopy and dilation and curettage to assess the uterine cavity and perform a therapeutic curettage  Consents signed on 4/27/23 for EUA, hysteroscopy and D&C    The patient was counseled regarding indications, risks, benefits and alternatives to surgical management  We discussed risks including but not limited to bleeding and potential need for blood transfusions, infection, pain, and uterine perforation    Patient understands potential risks associated with stress of surgery and general anesthesia including but not limited to acute cardiac events, venous thromboembolism, etc   All questions " answered to patient's satisfaction  Patient agrees and wants to proceed            Mary Correa MD  4/27/2023  8:37 AM

## 2023-05-03 ENCOUNTER — OFFICE VISIT (OUTPATIENT)
Dept: FAMILY MEDICINE CLINIC | Facility: CLINIC | Age: 28
End: 2023-05-03

## 2023-05-03 VITALS
TEMPERATURE: 97.7 F | HEART RATE: 78 BPM | WEIGHT: 144.6 LBS | HEIGHT: 67 IN | SYSTOLIC BLOOD PRESSURE: 129 MMHG | BODY MASS INDEX: 22.7 KG/M2 | RESPIRATION RATE: 18 BRPM | OXYGEN SATURATION: 99 % | DIASTOLIC BLOOD PRESSURE: 73 MMHG

## 2023-05-03 DIAGNOSIS — Z78.9 ALCOHOL USE: ICD-10-CM

## 2023-05-03 DIAGNOSIS — F41.9 ANXIETY AND DEPRESSION: ICD-10-CM

## 2023-05-03 DIAGNOSIS — F32.A ANXIETY AND DEPRESSION: ICD-10-CM

## 2023-05-03 DIAGNOSIS — F12.90 MARIJUANA USE, CONTINUOUS: ICD-10-CM

## 2023-05-03 DIAGNOSIS — N92.0 MENORRHAGIA WITH REGULAR CYCLE: Primary | ICD-10-CM

## 2023-05-03 DIAGNOSIS — F17.210 CIGARETTE SMOKER: ICD-10-CM

## 2023-05-03 DIAGNOSIS — Z00.00 ANNUAL PHYSICAL EXAM: ICD-10-CM

## 2023-05-03 PROBLEM — S02.2XXA NASAL FRACTURE: Status: RESOLVED | Noted: 2018-08-22 | Resolved: 2023-05-03

## 2023-05-03 PROBLEM — S00.81XA FACIAL ABRASION: Status: RESOLVED | Noted: 2018-08-22 | Resolved: 2023-05-03

## 2023-05-03 PROBLEM — F10.90 ALCOHOL USE: Status: ACTIVE | Noted: 2023-05-03

## 2023-05-03 RX ORDER — ESCITALOPRAM OXALATE 5 MG/1
5 TABLET ORAL DAILY
Qty: 30 TABLET | Refills: 5 | Status: SHIPPED | OUTPATIENT
Start: 2023-05-03

## 2023-05-03 NOTE — ASSESSMENT & PLAN NOTE
PHQ-9 score 7 today, BREANA-7 score 16 today  Give lexapro 5mg QD  SE educated pt   Refer to therapist

## 2023-05-03 NOTE — PROGRESS NOTES
[facility]    NAME: e  AGE: 33 y o  SEX: female  : 1995     DATE: 5/3/2023     Assessment and Plan:     Problem List Items Addressed This Visit        Other    Menorrhagia - Primary     FU GYN  Anxiety and depression     PHQ-9 score 7 today, BREANA-7 score 16 today  Give lexapro 5mg QD  SE educated pt  Refer to therapist           Relevant Medications    escitalopram (LEXAPRO) 5 mg tablet    Other Relevant Orders    Ambulatory Referral to [address] [place] [address] Therapists    Cigarette smoker     Sometimes smoker, advised pt to quit  Alcohol use     Advised pt to limit 1 drink each time  Marijuana use, continuous   Other Visit Diagnoses     Annual physical exam            Advised pt to check labs  Recommend flu shot yearly  Got covid19 shots  Refused booster  Pap 10/2022 negative  FU OBGYN  Maternal aunt had breast cancer at her 42's  Immunizations and preventive care screenings were discussed with patient today  Appropriate education was printed on patient's after visit summary  Counseling:  Alcohol/drug use: discussed moderation in alcohol intake, the recommendations for healthy alcohol use, and avoidance of illicit drug use  Dental Health: discussed importance of regular tooth brushing, flossing, and dental visits  Injury prevention: discussed safety/seat belts, safety helmets, smoke detectors, carbon dioxide detectors, and smoking near bedding or upholstery  Sexual health: discussed sexually transmitted diseases, partner selection, use of condoms, avoidance of unintended pregnancy, and contraceptive alternatives  · Exercise: the importance of regular exercise/physical activity was discussed  Recommend exercise 3-5 times per week for at least 30 minutes  Depression Screening and Follow-up Plan: Patient's depression screening was positive with a PHQ-2 score of 3  Their PHQ-9 score was 7  Patient assessed for underlying major depression  Brief counseling provided and recommend additional follow-up/re-evaluation next office visit  Tobacco Cessation Counseling: Tobacco cessation counseling was provided  The patient is sincerely urged to quit consumption of tobacco  She is not ready to quit tobacco  Medication options and side effects of medication discussed  Patient refused medication  Return in about 2 months (around 7/3/2023) for Recheck  Chief Complaint:     Chief Complaint   Patient presents with    Physical Exam     annual      History of Present Illness:     Adult Annual Physical   Patient here for a comprehensive physical exam  The patient reports problems - anxiety and depression  Menorrhagia---Got IUD  Still bleeding  FU OBGYN  Plan D&C in 2023 per pt  Anemia---FU hem/onc  Got IV iron before  Denies dizzy/lightheaded etc       Feels depression/anxiety since 2023  She is a worrier  Denies HI/SI  Would like to see therapist       Maynor Martínez occasionally  Alcohol 3-4 drink, 2-3 times/week  MJ daily  Diet and Physical Activity  · Diet/Nutrition: well balanced diet  · Exercise: walking  Depression Screening  PHQ-2/9 Depression Screening    Little interest or pleasure in doing things: 1 - several days  Feeling down, depressed, or hopeless: 2 - more than half the days  Trouble falling or staying asleep, or sleeping too much: 1 - several days  Feeling tired or having little energy: 1 - several days  Poor appetite or overeatin - not at all  Feeling bad about yourself - or that you are a failure or have let yourself or your family down: 1 - several days  Trouble concentrating on things, such as reading the newspaper or watching television: 0 - not at all  Moving or speaking so slowly that other people could have noticed   Or the opposite - being so fidgety or restless that you have been moving around a lot more than usual: 1 - several days  Thoughts that you would be better off dead, or of hurting yourself in some way: 0 - not at all  PHQ-2 Score: 3  PHQ-2 Interpretation: POSITIVE depression screen  PHQ-9 Score: 7   PHQ-9 Interpretation: Mild depression        General Health  · Sleep: sleep poorly, but she can function during day  · Hearing: normal - bilateral   · Vision: wears glasses  · Dental: no dental visits for >1 year  /GYN Health  · Last menstrual period:   · Contraceptive method: IUD placement  · History of STDs?: yes  Hx of herpes     Review of Systems:     Review of Systems   Constitutional: Negative for appetite change, chills and fever  HENT: Negative for congestion, ear pain, sinus pain and sore throat  Eyes: Negative for discharge and itching  Respiratory: Negative for apnea, cough, chest tightness, shortness of breath and wheezing  Cardiovascular: Negative for chest pain, palpitations and leg swelling  Gastrointestinal: Negative for abdominal pain, anal bleeding, constipation, diarrhea, nausea and vomiting  Endocrine: Negative for cold intolerance, heat intolerance and polyuria  Genitourinary: Negative for difficulty urinating and dysuria  Musculoskeletal: Negative for arthralgias, back pain and myalgias  Skin: Negative for rash  Neurological: Negative for dizziness and headaches  Psychiatric/Behavioral: Positive for sleep disturbance  Negative for agitation, self-injury and suicidal ideas  The patient is nervous/anxious         Past Medical History:     Past Medical History:   Diagnosis Date    Anemia     Facial abrasion 8/22/2018    Herpes     Nasal fracture 8/22/2018    Syphilis     resolved      Past Surgical History:     Past Surgical History:   Procedure Laterality Date    HERNIA REPAIR      HERNIA REPAIR  [id number]    umbilical hernia repair - resolved      Social History:     Social History     Socioeconomic History    Marital status: Single     Spouse name: None  Number of children: None    Years of education: None    Highest education level: None   Occupational History    None   Tobacco Use    Smoking status: Some Days     Packs/day: 0 25     Types: Cigarettes    Smokeless tobacco: Never   Vaping Use    Vaping Use: Some days    Substances: Nicotine, THC, CBD   Substance and Sexual Activity    Alcohol use: Yes     Alcohol/week: 6 0 - 12 0 standard drinks     Types: 6 - 12 Standard drinks or equivalent per week    Drug use: Yes     Types: Marijuana    Sexual activity: Yes     Partners: Male     Birth control/protection: Condom Male, I U D  Other Topics Concern    None   Social History Narrative    ** Merged History Encounter **          Social Determinants of Health     Financial Resource Strain: Low Risk     Difficulty of Paying Living Expenses: Not hard at all   Food Insecurity: No Food Insecurity    Worried About Running Out of Food in the Last Year: Never true    Molly of Food in the Last Year: Never true   Transportation Needs: No Transportation Needs    Lack of Transportation (Medical): No    Lack of Transportation (Non-Medical):  No   Physical Activity: Not on file   Stress: Not on file   Social Connections: Not on file   Intimate Partner Violence: Not on file   Housing Stability: Not on file      Family History:     Family History   Problem Relation Age of Onset    Thyroid disease Mother     Asthma Mother     Coronary artery disease Mother     Hypertension Mother     No Known Problems Brother     No Known Problems Brother     Diabetes Maternal Grandmother     Hypertension Maternal Grandmother     Stroke Maternal Grandfather         syndrome      Current Medications:     Current Outpatient Medications   Medication Sig Dispense Refill    escitalopram (LEXAPRO) 5 mg tablet Take 1 tablet (5 mg total) by mouth daily 30 tablet 5    Prenatal Vit-Fe Fumarate-FA (PRENATAL 1+1 PO) Take by mouth       No current facility-administered medications "for this visit  Allergies:     No Known Allergies   Physical Exam:     /73   Pulse 78   Temp 97 7 °F (36 5 °C) (Tympanic)   Resp 18   Ht 5' 7\" (1 702 m)   Wt 65 6 kg (144 lb 9 6 oz)   LMP 04/06/2023   SpO2 99%   BMI 22 65 kg/m²     Physical Exam  Vitals reviewed  Constitutional:       Appearance: Normal appearance  HENT:      Head: Normocephalic and atraumatic  Eyes:      General:         Right eye: No discharge  Left eye: No discharge  Conjunctiva/sclera: Conjunctivae normal    Neck:      Vascular: No carotid bruit  Cardiovascular:      Rate and Rhythm: Normal rate and regular rhythm  Heart sounds: Normal heart sounds  No murmur heard  No friction rub  No gallop  Pulmonary:      Effort: Pulmonary effort is normal  No respiratory distress  Breath sounds: Normal breath sounds  No wheezing or rales  Abdominal:      General: Bowel sounds are normal  There is no distension  Palpations: Abdomen is soft  Tenderness: There is no abdominal tenderness  Musculoskeletal:         General: No swelling, tenderness or deformity  Normal range of motion  Cervical back: Normal range of motion and neck supple  No muscular tenderness  Lymphadenopathy:      Cervical: No cervical adenopathy  Neurological:      Mental Status: She is alert     Psychiatric:         Mood and Affect: Mood normal           Michael Castillo MD   [address]  "

## 2023-05-16 ENCOUNTER — APPOINTMENT (OUTPATIENT)
Dept: LAB | Facility: CLINIC | Age: 28
End: 2023-05-16

## 2023-05-16 DIAGNOSIS — D50.0 IRON DEFICIENCY ANEMIA DUE TO CHRONIC BLOOD LOSS: ICD-10-CM

## 2023-05-16 DIAGNOSIS — N92.0 MENORRHAGIA WITH REGULAR CYCLE: ICD-10-CM

## 2023-05-16 DIAGNOSIS — N92.1 MENORRHAGIA WITH IRREGULAR CYCLE: ICD-10-CM

## 2023-05-16 DIAGNOSIS — D50.9 IRON DEFICIENCY ANEMIA, UNSPECIFIED IRON DEFICIENCY ANEMIA TYPE: ICD-10-CM

## 2023-05-16 LAB
ALBUMIN SERPL BCP-MCNC: 3.8 G/DL (ref 3.5–5)
ALP SERPL-CCNC: 50 U/L (ref 46–116)
ALT SERPL W P-5'-P-CCNC: 15 U/L (ref 12–78)
ANION GAP SERPL CALCULATED.3IONS-SCNC: 4 MMOL/L (ref 4–13)
AST SERPL W P-5'-P-CCNC: 8 U/L (ref 5–45)
BASOPHILS # BLD AUTO: 0.06 THOUSANDS/ÂΜL (ref 0–0.1)
BASOPHILS NFR BLD AUTO: 2 % (ref 0–1)
BILIRUB SERPL-MCNC: 0.54 MG/DL (ref 0.2–1)
BUN SERPL-MCNC: 15 MG/DL (ref 5–25)
CALCIUM SERPL-MCNC: 9 MG/DL (ref 8.3–10.1)
CHLORIDE SERPL-SCNC: 107 MMOL/L (ref 96–108)
CO2 SERPL-SCNC: 25 MMOL/L (ref 21–32)
CREAT SERPL-MCNC: 0.84 MG/DL (ref 0.6–1.3)
EOSINOPHIL # BLD AUTO: 0.1 THOUSAND/ÂΜL (ref 0–0.61)
EOSINOPHIL NFR BLD AUTO: 2 % (ref 0–6)
ERYTHROCYTE [DISTWIDTH] IN BLOOD BY AUTOMATED COUNT: 14.5 % (ref 11.6–15.1)
FERRITIN SERPL-MCNC: 4 NG/ML (ref 11–307)
GFR SERPL CREATININE-BSD FRML MDRD: 94 ML/MIN/1.73SQ M
GLUCOSE P FAST SERPL-MCNC: 89 MG/DL (ref 65–99)
HCT VFR BLD AUTO: 32.6 % (ref 34.8–46.1)
HGB BLD-MCNC: 10 G/DL (ref 11.5–15.4)
IMM GRANULOCYTES # BLD AUTO: 0.01 THOUSAND/UL (ref 0–0.2)
IMM GRANULOCYTES NFR BLD AUTO: 0 % (ref 0–2)
IRON SATN MFR SERPL: 6 % (ref 15–50)
IRON SERPL-MCNC: 20 UG/DL (ref 50–170)
LYMPHOCYTES # BLD AUTO: 0.77 THOUSANDS/ÂΜL (ref 0.6–4.47)
LYMPHOCYTES NFR BLD AUTO: 19 % (ref 14–44)
MCH RBC QN AUTO: 26.2 PG (ref 26.8–34.3)
MCHC RBC AUTO-ENTMCNC: 30.7 G/DL (ref 31.4–37.4)
MCV RBC AUTO: 86 FL (ref 82–98)
MONOCYTES # BLD AUTO: 0.41 THOUSAND/ÂΜL (ref 0.17–1.22)
MONOCYTES NFR BLD AUTO: 10 % (ref 4–12)
NEUTROPHILS # BLD AUTO: 2.77 THOUSANDS/ÂΜL (ref 1.85–7.62)
NEUTS SEG NFR BLD AUTO: 67 % (ref 43–75)
NRBC BLD AUTO-RTO: 0 /100 WBCS
PLATELET # BLD AUTO: 379 THOUSANDS/UL (ref 149–390)
PMV BLD AUTO: 10.9 FL (ref 8.9–12.7)
POTASSIUM SERPL-SCNC: 3.9 MMOL/L (ref 3.5–5.3)
PROT SERPL-MCNC: 7.1 G/DL (ref 6.4–8.4)
RBC # BLD AUTO: 3.81 MILLION/UL (ref 3.81–5.12)
SODIUM SERPL-SCNC: 136 MMOL/L (ref 135–147)
TIBC SERPL-MCNC: 350 UG/DL (ref 250–450)
TSH SERPL DL<=0.05 MIU/L-ACNC: 2.07 UIU/ML (ref 0.45–4.5)
VIT B12 SERPL-MCNC: 527 PG/ML (ref 180–914)
WBC # BLD AUTO: 4.12 THOUSAND/UL (ref 4.31–10.16)

## 2023-05-17 ENCOUNTER — TELEPHONE (OUTPATIENT)
Dept: GYNECOLOGIC ONCOLOGY | Facility: CLINIC | Age: 28
End: 2023-05-17

## 2023-05-17 ENCOUNTER — OFFICE VISIT (OUTPATIENT)
Dept: HEMATOLOGY ONCOLOGY | Facility: CLINIC | Age: 28
End: 2023-05-17

## 2023-05-17 VITALS
SYSTOLIC BLOOD PRESSURE: 124 MMHG | RESPIRATION RATE: 17 BRPM | DIASTOLIC BLOOD PRESSURE: 64 MMHG | WEIGHT: 140 LBS | HEIGHT: 67 IN | BODY MASS INDEX: 21.97 KG/M2 | OXYGEN SATURATION: 99 % | HEART RATE: 67 BPM

## 2023-05-17 DIAGNOSIS — D50.9 IRON DEFICIENCY ANEMIA, UNSPECIFIED IRON DEFICIENCY ANEMIA TYPE: ICD-10-CM

## 2023-05-17 DIAGNOSIS — D50.0 IRON DEFICIENCY ANEMIA DUE TO CHRONIC BLOOD LOSS: Primary | ICD-10-CM

## 2023-05-17 RX ORDER — SODIUM CHLORIDE 9 MG/ML
20 INJECTION, SOLUTION INTRAVENOUS ONCE
OUTPATIENT
Start: 2023-05-24

## 2023-05-17 NOTE — TELEPHONE ENCOUNTER
While we try to accommodate patient requests, our priority is to schedule treatment according to Doctor's orders and site availability  Does the Provider use the intake sheet or checkout note? What would be a preferred day of the week that would work best for your infusion appointment? Mondays   Do you prefer mornings or afternoons for your appointments? Any time   Are there any days or dates that do not work for your schedule, including any upcoming vacations? N/A   We are going to try our best to schedule you at the infusion center closest to your home  In the event that we are unable to what would be your next preferred infusion site or sites? 1  [facility] infusion   2  [facility] infusion     Do you have transportation to take you to all of your appointments?  Yes   Would you like the infusion center to draw labs from your port? (disregard if patient doesn't have a port or need labs for infusion appointment)
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
## 2023-05-17 NOTE — TELEPHONE ENCOUNTER
Called patient to confirm appts, per patient will like to reschedule 6/12 to a later time, 830 is too early  Please reschedule patient  Per patient will check [facility] for updated schedule  Thank you!

## 2023-06-12 ENCOUNTER — HOSPITAL ENCOUNTER (OUTPATIENT)
Dept: INFUSION CENTER | Facility: HOSPITAL | Age: 28
Discharge: HOME | End: 2023-06-12
Attending: INTERNAL MEDICINE
Payer: COMMERCIAL

## 2023-06-12 VITALS
TEMPERATURE: 97.5 F | OXYGEN SATURATION: 99 % | HEART RATE: 77 BPM | RESPIRATION RATE: 18 BRPM | DIASTOLIC BLOOD PRESSURE: 70 MMHG | SYSTOLIC BLOOD PRESSURE: 103 MMHG

## 2023-06-12 DIAGNOSIS — D50.9 IRON DEFICIENCY ANEMIA, UNSPECIFIED IRON DEFICIENCY ANEMIA TYPE: Primary | ICD-10-CM

## 2023-06-12 RX ORDER — SODIUM CHLORIDE 9 MG/ML
20 INJECTION, SOLUTION INTRAVENOUS ONCE
Status: COMPLETED | OUTPATIENT
Start: 2023-06-12 | End: 2023-06-12

## 2023-06-12 RX ORDER — SODIUM CHLORIDE 9 MG/ML
20 INJECTION, SOLUTION INTRAVENOUS ONCE
Status: CANCELLED | OUTPATIENT
Start: 2023-06-19

## 2023-06-12 RX ADMIN — SODIUM CHLORIDE 20 ML/HR: 0.9 INJECTION, SOLUTION INTRAVENOUS at 12:14

## 2023-06-12 RX ADMIN — SODIUM CHLORIDE 300 MG: 0.9 INJECTION, SOLUTION INTRAVENOUS at 12:20

## 2023-06-19 ENCOUNTER — HOSPITAL ENCOUNTER (OUTPATIENT)
Dept: INFUSION CENTER | Facility: HOSPITAL | Age: 28
Discharge: HOME | End: 2023-06-19
Attending: INTERNAL MEDICINE
Payer: COMMERCIAL

## 2023-06-19 VITALS
HEART RATE: 66 BPM | SYSTOLIC BLOOD PRESSURE: 123 MMHG | TEMPERATURE: 97.6 F | DIASTOLIC BLOOD PRESSURE: 79 MMHG | RESPIRATION RATE: 18 BRPM | OXYGEN SATURATION: 97 %

## 2023-06-19 DIAGNOSIS — D50.9 IRON DEFICIENCY ANEMIA, UNSPECIFIED IRON DEFICIENCY ANEMIA TYPE: Primary | ICD-10-CM

## 2023-06-19 PROCEDURE — 96365 THER/PROPH/DIAG IV INF INIT: CPT

## 2023-06-19 RX ORDER — SODIUM CHLORIDE 9 MG/ML
20 INJECTION, SOLUTION INTRAVENOUS ONCE
OUTPATIENT
Start: 2023-06-26

## 2023-06-19 RX ORDER — SODIUM CHLORIDE 9 MG/ML
20 INJECTION, SOLUTION INTRAVENOUS ONCE
Status: COMPLETED | OUTPATIENT
Start: 2023-06-19 | End: 2023-06-19

## 2023-06-19 RX ADMIN — SODIUM CHLORIDE 20 ML/HR: 0.9 INJECTION, SOLUTION INTRAVENOUS at 10:01

## 2023-06-19 RX ADMIN — IRON SUCROSE 300 MG: 20 INJECTION, SOLUTION INTRAVENOUS at 10:04

## 2023-06-19 NOTE — PROGRESS NOTES
Pt tolerated Venofer infusion without difficulty  No s/s reaction noted  Next appt confirmed  AVS declined  Left ambulatory in stable condition

## 2023-06-26 ENCOUNTER — RA CDI HCC (OUTPATIENT)
Dept: OTHER | Facility: HOSPITAL | Age: 28
End: 2023-06-26

## 2023-06-26 NOTE — PROGRESS NOTES
[facility] 75  coding opportunities          Chart Reviewed number of suggestions sent to Provider: 1   P56 D  If applicable, please further classify as per ICD 10 coding guidelines    Patients Insurance        Commercial Insurance: Roe Terrell

## 2023-07-21 ENCOUNTER — ANESTHESIA EVENT (OUTPATIENT)
Dept: PERIOP | Facility: HOSPITAL | Age: 28
End: 2023-07-21
Payer: COMMERCIAL

## 2023-07-21 ENCOUNTER — DOCUMENTATION (OUTPATIENT)
Dept: OBGYN CLINIC | Facility: CLINIC | Age: 28
End: 2023-07-21

## 2023-07-21 NOTE — PROGRESS NOTES
Pt did not have a pre op exam  Cancelled her surgery for 7/24.   I will call back when I find a date for her  Had to leave her a message on her cell phone

## 2023-07-24 ENCOUNTER — ANESTHESIA (OUTPATIENT)
Dept: PERIOP | Facility: HOSPITAL | Age: 28
End: 2023-07-24
Payer: COMMERCIAL

## 2023-07-24 ENCOUNTER — HOSPITAL ENCOUNTER (OUTPATIENT)
Facility: HOSPITAL | Age: 28
Setting detail: OUTPATIENT SURGERY
Discharge: HOME | End: 2023-07-24
Attending: OBSTETRICS & GYNECOLOGY | Admitting: OBSTETRICS & GYNECOLOGY
Payer: COMMERCIAL

## 2023-07-24 VITALS
RESPIRATION RATE: 16 BRPM | BODY MASS INDEX: 23.01 KG/M2 | TEMPERATURE: 98.2 F | HEIGHT: 67 IN | DIASTOLIC BLOOD PRESSURE: 58 MMHG | HEART RATE: 65 BPM | OXYGEN SATURATION: 98 % | SYSTOLIC BLOOD PRESSURE: 113 MMHG | WEIGHT: 146.61 LBS

## 2023-07-24 DIAGNOSIS — D50.9 IRON DEFICIENCY ANEMIA, UNSPECIFIED IRON DEFICIENCY ANEMIA TYPE: Primary | ICD-10-CM

## 2023-07-24 DIAGNOSIS — N93.9 ABNORMAL UTERINE BLEEDING (AUB): ICD-10-CM

## 2023-07-24 DIAGNOSIS — N92.0 EXCESSIVE OR FREQUENT MENSTRUATION: ICD-10-CM

## 2023-07-24 LAB
BASOPHILS # BLD AUTO: 0.06 THOUSANDS/ÂΜL (ref 0–0.1)
BASOPHILS NFR BLD AUTO: 1 % (ref 0–1)
EOSINOPHIL # BLD AUTO: 0.4 THOUSAND/ÂΜL (ref 0–0.61)
EOSINOPHIL NFR BLD AUTO: 7 % (ref 0–6)
ERYTHROCYTE [DISTWIDTH] IN BLOOD BY AUTOMATED COUNT: 22.4 % (ref 11.6–15.1)
EXT PREGNANCY TEST URINE: NEGATIVE
EXT. CONTROL: NORMAL
HCT VFR BLD AUTO: 35.9 % (ref 34.8–46.1)
HGB BLD-MCNC: 10.5 G/DL (ref 11.5–15.4)
IMM GRANULOCYTES # BLD AUTO: 0 THOUSAND/UL (ref 0–0.2)
IMM GRANULOCYTES NFR BLD AUTO: 0 % (ref 0–2)
LYMPHOCYTES # BLD AUTO: 1.75 THOUSANDS/ÂΜL (ref 0.6–4.47)
LYMPHOCYTES NFR BLD AUTO: 33 % (ref 14–44)
MCH RBC QN AUTO: 24 PG (ref 26.8–34.3)
MCHC RBC AUTO-ENTMCNC: 29.2 G/DL (ref 31.4–37.4)
MCV RBC AUTO: 82 FL (ref 82–98)
MONOCYTES # BLD AUTO: 0.65 THOUSAND/ÂΜL (ref 0.17–1.22)
MONOCYTES NFR BLD AUTO: 12 % (ref 4–12)
NEUTROPHILS # BLD AUTO: 2.53 THOUSANDS/ÂΜL (ref 1.85–7.62)
NEUTS SEG NFR BLD AUTO: 47 % (ref 43–75)
NRBC BLD AUTO-RTO: 0 /100 WBCS
PLATELET # BLD AUTO: 231 THOUSANDS/UL (ref 149–390)
PMV BLD AUTO: 10.2 FL (ref 8.9–12.7)
RBC # BLD AUTO: 4.37 MILLION/UL (ref 3.81–5.12)
WBC # BLD AUTO: 5.39 THOUSAND/UL (ref 4.31–10.16)

## 2023-07-24 PROCEDURE — NC001 PR NO CHARGE: Performed by: OBSTETRICS & GYNECOLOGY

## 2023-07-24 PROCEDURE — 81025 URINE PREGNANCY TEST: CPT | Performed by: OBSTETRICS & GYNECOLOGY

## 2023-07-24 PROCEDURE — 88305 TISSUE EXAM BY PATHOLOGIST: CPT | Performed by: STUDENT IN AN ORGANIZED HEALTH CARE EDUCATION/TRAINING PROGRAM

## 2023-07-24 PROCEDURE — 85025 COMPLETE CBC W/AUTO DIFF WBC: CPT | Performed by: NURSE PRACTITIONER

## 2023-07-24 PROCEDURE — 58301 REMOVE INTRAUTERINE DEVICE: CPT | Performed by: OBSTETRICS & GYNECOLOGY

## 2023-07-24 PROCEDURE — 58558 HYSTEROSCOPY BIOPSY: CPT | Performed by: OBSTETRICS & GYNECOLOGY

## 2023-07-24 RX ORDER — DEXAMETHASONE SODIUM PHOSPHATE 10 MG/ML
INJECTION, SOLUTION INTRAMUSCULAR; INTRAVENOUS AS NEEDED
Status: DISCONTINUED | OUTPATIENT
Start: 2023-07-24 | End: 2023-07-24

## 2023-07-24 RX ORDER — ACETAMINOPHEN 325 MG/1
975 TABLET ORAL EVERY 6 HOURS PRN
Status: DISCONTINUED | OUTPATIENT
Start: 2023-07-24 | End: 2023-07-24 | Stop reason: HOSPADM

## 2023-07-24 RX ORDER — ACETAMINOPHEN 500 MG
500 TABLET ORAL EVERY 6 HOURS PRN
COMMUNITY

## 2023-07-24 RX ORDER — FENTANYL CITRATE/PF 50 MCG/ML
50 SYRINGE (ML) INJECTION
Status: DISCONTINUED | OUTPATIENT
Start: 2023-07-24 | End: 2023-07-24 | Stop reason: HOSPADM

## 2023-07-24 RX ORDER — FENTANYL CITRATE 50 UG/ML
INJECTION, SOLUTION INTRAMUSCULAR; INTRAVENOUS AS NEEDED
Status: DISCONTINUED | OUTPATIENT
Start: 2023-07-24 | End: 2023-07-24

## 2023-07-24 RX ORDER — SODIUM CHLORIDE 9 MG/ML
INJECTION, SOLUTION INTRAVENOUS AS NEEDED
Status: DISCONTINUED | OUTPATIENT
Start: 2023-07-24 | End: 2023-07-24 | Stop reason: HOSPADM

## 2023-07-24 RX ORDER — LIDOCAINE HYDROCHLORIDE 20 MG/ML
INJECTION, SOLUTION EPIDURAL; INFILTRATION; INTRACAUDAL; PERINEURAL AS NEEDED
Status: DISCONTINUED | OUTPATIENT
Start: 2023-07-24 | End: 2023-07-24

## 2023-07-24 RX ORDER — PROPOFOL 10 MG/ML
INJECTION, EMULSION INTRAVENOUS AS NEEDED
Status: DISCONTINUED | OUTPATIENT
Start: 2023-07-24 | End: 2023-07-24

## 2023-07-24 RX ORDER — ONDANSETRON 2 MG/ML
4 INJECTION INTRAMUSCULAR; INTRAVENOUS ONCE AS NEEDED
Status: DISCONTINUED | OUTPATIENT
Start: 2023-07-24 | End: 2023-07-24 | Stop reason: HOSPADM

## 2023-07-24 RX ORDER — MIDAZOLAM HYDROCHLORIDE 2 MG/2ML
INJECTION, SOLUTION INTRAMUSCULAR; INTRAVENOUS AS NEEDED
Status: DISCONTINUED | OUTPATIENT
Start: 2023-07-24 | End: 2023-07-24

## 2023-07-24 RX ORDER — ONDANSETRON 2 MG/ML
4 INJECTION INTRAMUSCULAR; INTRAVENOUS EVERY 6 HOURS PRN
Status: DISCONTINUED | OUTPATIENT
Start: 2023-07-24 | End: 2023-07-24 | Stop reason: HOSPADM

## 2023-07-24 RX ORDER — SODIUM CHLORIDE 9 MG/ML
125 INJECTION, SOLUTION INTRAVENOUS CONTINUOUS
Status: DISCONTINUED | OUTPATIENT
Start: 2023-07-24 | End: 2023-07-24 | Stop reason: HOSPADM

## 2023-07-24 RX ORDER — IBUPROFEN 600 MG/1
600 TABLET ORAL EVERY 6 HOURS PRN
Status: DISCONTINUED | OUTPATIENT
Start: 2023-07-24 | End: 2023-07-24 | Stop reason: HOSPADM

## 2023-07-24 RX ORDER — ONDANSETRON 2 MG/ML
INJECTION INTRAMUSCULAR; INTRAVENOUS AS NEEDED
Status: DISCONTINUED | OUTPATIENT
Start: 2023-07-24 | End: 2023-07-24

## 2023-07-24 RX ORDER — KETOROLAC TROMETHAMINE 30 MG/ML
INJECTION, SOLUTION INTRAMUSCULAR; INTRAVENOUS AS NEEDED
Status: DISCONTINUED | OUTPATIENT
Start: 2023-07-24 | End: 2023-07-24

## 2023-07-24 RX ORDER — SODIUM CHLORIDE 9 MG/ML
20 INJECTION, SOLUTION INTRAVENOUS ONCE
Status: CANCELLED | OUTPATIENT
Start: 2023-07-26

## 2023-07-24 RX ADMIN — PROPOFOL 200 MG: 10 INJECTION, EMULSION INTRAVENOUS at 08:32

## 2023-07-24 RX ADMIN — SODIUM CHLORIDE 125 ML/HR: 0.9 INJECTION, SOLUTION INTRAVENOUS at 08:06

## 2023-07-24 RX ADMIN — KETOROLAC TROMETHAMINE 30 MG: 30 INJECTION, SOLUTION INTRAMUSCULAR; INTRAVENOUS at 09:02

## 2023-07-24 RX ADMIN — FENTANYL CITRATE 50 MCG: 50 INJECTION INTRAMUSCULAR; INTRAVENOUS at 09:26

## 2023-07-24 RX ADMIN — ONDANSETRON 4 MG: 2 INJECTION INTRAMUSCULAR; INTRAVENOUS at 08:36

## 2023-07-24 RX ADMIN — FENTANYL CITRATE 25 MCG: 50 INJECTION INTRAMUSCULAR; INTRAVENOUS at 08:51

## 2023-07-24 RX ADMIN — MIDAZOLAM 2 MG: 1 INJECTION INTRAMUSCULAR; INTRAVENOUS at 08:25

## 2023-07-24 RX ADMIN — LIDOCAINE HYDROCHLORIDE 80 MG: 20 INJECTION, SOLUTION EPIDURAL; INFILTRATION; INTRACAUDAL at 08:32

## 2023-07-24 RX ADMIN — FENTANYL CITRATE 25 MCG: 50 INJECTION INTRAMUSCULAR; INTRAVENOUS at 08:36

## 2023-07-24 RX ADMIN — DEXAMETHASONE SODIUM PHOSPHATE 10 MG: 10 INJECTION INTRAMUSCULAR; INTRAVENOUS at 08:36

## 2023-07-24 RX ADMIN — ACETAMINOPHEN 325MG 975 MG: 325 TABLET ORAL at 10:18

## 2023-07-24 RX ADMIN — SODIUM CHLORIDE 125 ML/HR: 0.9 INJECTION, SOLUTION INTRAVENOUS at 09:31

## 2023-07-24 NOTE — ANESTHESIA POSTPROCEDURE EVALUATION
Post-Op Assessment Note    CV Status:  Stable    Pain management: adequate     Mental Status:  Alert and awake   Hydration Status:  Euvolemic   PONV Controlled:  Controlled   Airway Patency:  Patent      Post Op Vitals Reviewed: Yes      Staff: Anesthesiologist         No notable events documented.     /69 (07/24/23 0945)    Temp 98.2 °F (36.8 °C) (07/24/23 0945)    Pulse 60 (07/24/23 0945)   Resp 17 (07/24/23 0945)    SpO2 96 % (07/24/23 0945)

## 2023-07-24 NOTE — OP NOTE
OPERATIVE REPORT  PATIENT NAME: Manny Hoskins    :  1995  MRN: [id number]  Pt Location: [facility] OR ROOM 01    SURGERY DATE: 2023    Surgeon(s) and Role:     Wyman Bis Toussaint-DO Jose - Primary     * Gio Michelle MD - Assisting    Preop Diagnosis:  Abnormal uterine bleeding (AUB) [N93.9]    Post-Op Diagnosis Codes:     * Abnormal uterine bleeding (AUB) [N93.9]    Procedure(s):  (D&C) W/ HYSTEROSCOPY  IUD Removal  (EUA)    Specimen(s):  ID Type Source Tests Collected by Time Destination   1 : [facility] Tissue Endometrium TISSUE EXAM Eden Jean, DO 2023 0902        Estimated Blood Loss:   Minimal, <10 mL    Drains:  * No LDAs found *    Anesthesia Type:   General LMA    Operative Indications:  Abnormal uterine bleeding (AUB) [N93.9]    Operative Findings:  1. External genitalia grossly normal in appearance. No ulcerations, no lacerations, no lesions. 2.  Vagina and cervix were grossly normal in appearance without any lacerations or lesions. IUD strings visualized at the cervix. 3. Uterus sounded to 10 cm. 4. Hysteroscopic examination revealed thickened, proliferative endometrial lining. Polypoid tissue was noted in the posterior aspect of the uterus. Bilateral ostia were  visualized. 5. Fluid deficit was 311 mL    Complications:   None    Procedure and Technique:  The patient was taken to the operating room where a time out was performed to confirm correct patient and correct procedure. General LMA anesthesia (LMA) was administered and the patient was positioned on the OR table in the dorsal lithotomy position. All pressure points were padded. The patient was prepped and draped in the usual sterile fashion. A straight catheter was introduced into the bladder, which was drained of 25cc of clear yellow urine.  A weighted speculum was inserted into the vagina and a Joanette Paget was used to visualize the anterior lip of the cervix, which was then grasped with a single toothed tenaculum. The weighted speculum was then removed and the Sanger retractor used to maintain visualization posterior to the cervix. The IUD strings were grasped with a ring forcep and the IUD was removed from the uterus atraumatically. The uterus was sounded to 10cm. The cervix was serially dilated to 16 using Vimal dilators for introduction of the hysteroscope. Hysteroscope was introduced under direct visualization using normal saline solution as the distention media. Hysteroscope was advanced to the uterine fundus and the entire uterine cavity was inspected in a systematic manner. There was noted to be thickened and proliferative endometrium throughout the uterus with polypoid tissue on the posterior aspect of the uterus. Hysteroscope was withdrawn and sharp curetting was performed, starting at the 12'oclock position and rotating a total of 360 degrees to cover all surfaces. A polyp grasper was inserted and targeted at the posterior aspect of the service to remove the polypoid endometrial tissue. Endometrial tissue was obtained and sent for pathology. The hysteroscope was then re-introduced under direct visualization, again using normal saline solution as the distention media. Entire uterine cavity was inspected in a systematic manner. Adequate curetting was confirmed and hysteroscopy was withdrawn. The single toothed tenaculum was removed from the anterior lip of the cervix. Good hemostasis was confirmed at the tenaculum puncture sites. Weighted speculum was then removed from the vagina. At the conclusion of the procedure, all needle, sponge, and instrument counts were noted to be correct x2. Dr. Nilton Beckman was present and participated in all key portions of the case.     Patient Disposition:  PACU     SIGNATURE: Efren Yu MD  DATE: July 24, 2023  TIME: 9:14 AM

## 2023-07-24 NOTE — INTERVAL H&P NOTE
H&P reviewed. After examining the patient I find no changes in the patients condition since the H&P had been written. IUD will be removed prior to start the procedure.     Vitals:    07/24/23 0743   BP: 108/53   Pulse: (!) 52   Resp: 18   Temp: 97.7 °F (36.5 °C)   SpO2: 100%

## 2023-07-24 NOTE — H&P
Assessment   AUB     Plan    Espinoza Esparza is scheduled for D&C hysteroscopy possible polypectomy on 23. Pre-op instructions, including showering with Hibiclens, discussed with patient and patient received paper copy. The risks, benefits and alternatives to the procedure were discussed. We discussed the risks of pain, bleeding, blood clot, infection, allergic reaction, neurovascular injury, injury to uterus, injury to surrounding structures such as bowel, bladder and/or ureters, and possibility of inability to complete the procedure. We discussed code status - full code. Blood transfusion is acceptable. We discussed resident physician participation in the procedure, including pelvic exam.  All questions answered, consent obtained. Subjective     Espinoza Esparza is a 33 y.o. female here for a pre-op consultation. She is without complaint today. Patient Active Problem List   Diagnosis   • Iron deficiency anemia   • Left knee pain   • Anemia   • Deviated nasal septum   • Fatigue   • Menorrhagia   • Snores   • Anxiety and depression   • Cigarette smoker   • Alcohol use   • Marijuana use, continuous         Gynecologic History  No LMP recorded. Patient has had an implant. Contraception: Nexplanon  Last Pap: 10/13/22.  Results were: normal    Obstetric History  OB History    Para Term  AB Living   0 0 0 0 0 0   SAB IAB Ectopic Multiple Live Births   0 0 0 0 0       Past Medical/Surgical/Family/Social History    Past Medical History:   Diagnosis Date   • Anemia    • Facial abrasion 2018   • Herpes    • History of transfusion    • Nasal fracture 2018   • Syphilis     resolved     Past Surgical History:   Procedure Laterality Date   • HERNIA REPAIR     • HERNIA REPAIR  0612    umbilical hernia repair - resolved     Family History   Problem Relation Age of Onset   • Thyroid disease Mother    • Asthma Mother    • Coronary artery disease Mother    • Hypertension Mother    • No Known Problems Brother    • No Known Problems Brother    • Diabetes Maternal Grandmother    • Hypertension Maternal Grandmother    • Stroke Maternal Grandfather         syndrome     Social History     Socioeconomic History   • Marital status: Single     Spouse name: Not on file   • Number of children: Not on file   • Years of education: Not on file   • Highest education level: Not on file   Occupational History   • Not on file   Tobacco Use   • Smoking status: Some Days     Packs/day: 0.25     Types: Cigarettes   • Smokeless tobacco: Never   Vaping Use   • Vaping Use: Some days   • Substances: Nicotine, THC, CBD   Substance and Sexual Activity   • Alcohol use: Yes     Alcohol/week: 6.0 - 12.0 standard drinks of alcohol     Types: 6 - 12 Standard drinks or equivalent per week     Comment: 1-2x a week   • Drug use: Yes     Types: Marijuana   • Sexual activity: Yes     Partners: Male     Birth control/protection: Condom Male, I.U.D. Other Topics Concern   • Not on file   Social History Narrative    ** Merged History Encounter **          Social Determinants of Health     Financial Resource Strain: Low Risk  (4/27/2023)    Overall Financial Resource Strain (CARDIA)    • Difficulty of Paying Living Expenses: Not hard at all   Food Insecurity: No Food Insecurity (4/27/2023)    Hunger Vital Sign    • Worried About Running Out of Food in the Last Year: Never true    • Ran Out of Food in the Last Year: Never true   Transportation Needs: No Transportation Needs (4/27/2023)    PRAPARE - Transportation    • Lack of Transportation (Medical): No    • Lack of Transportation (Non-Medical): No   Physical Activity: Not on file   Stress: Not on file   Social Connections: Not on file   Intimate Partner Violence: Not on file   Housing Stability: Not on file         Patient has no known allergies.     Current Facility-Administered Medications:   •  sodium chloride 0.9 % infusion, 125 mL/hr, Intravenous, Continuous, Laboni Florentino Samson, DO      Review of Systems  Constitutional: no fever, feels well  CV: No complaints of chest pain, palpitations  Pulm: No shortness of breath or dyspnea on exertion  Gastrointestinal: no complaints of abdominal pain, nausea  Genitourinary : no complaints of dysuria, vaginal discharge       Objective     /53   Pulse (!) 52   Temp 97.7 °F (36.5 °C) (Temporal)   Resp 18   Ht 5' 7" (1.702 m)   Wt 66.5 kg (146 lb 9.7 oz)   SpO2 100%   BMI 22.96 kg/m²     GEN: The patient was alert and oriented x3, pleasant well-appearing female in no acute distress.    CV: Regular rate and rhythm  PULM: nonlabored respirations, CTAB  ABD: BS positive, soft, nontender  : deferred  EXT: no calf tenderness  MSK: Normal gait  Skin: warm, dry  Neuro: no focal deficits  Psych: normal affect and judgement, cooperative

## 2023-07-26 ENCOUNTER — HOSPITAL ENCOUNTER (OUTPATIENT)
Dept: INFUSION CENTER | Facility: HOSPITAL | Age: 28
Discharge: HOME | End: 2023-07-26
Attending: INTERNAL MEDICINE
Payer: COMMERCIAL

## 2023-07-26 VITALS
SYSTOLIC BLOOD PRESSURE: 114 MMHG | OXYGEN SATURATION: 96 % | RESPIRATION RATE: 18 BRPM | HEART RATE: 76 BPM | TEMPERATURE: 97.6 F | DIASTOLIC BLOOD PRESSURE: 70 MMHG

## 2023-07-26 DIAGNOSIS — D50.9 IRON DEFICIENCY ANEMIA, UNSPECIFIED IRON DEFICIENCY ANEMIA TYPE: Primary | ICD-10-CM

## 2023-07-26 PROCEDURE — 96365 THER/PROPH/DIAG IV INF INIT: CPT

## 2023-07-26 PROCEDURE — 96366 THER/PROPH/DIAG IV INF ADDON: CPT

## 2023-07-26 RX ORDER — SODIUM CHLORIDE 9 MG/ML
20 INJECTION, SOLUTION INTRAVENOUS ONCE
Status: COMPLETED | OUTPATIENT
Start: 2023-07-26 | End: 2023-07-26

## 2023-07-26 RX ORDER — SODIUM CHLORIDE 9 MG/ML
20 INJECTION, SOLUTION INTRAVENOUS ONCE
Status: CANCELLED | OUTPATIENT
Start: 2023-08-02

## 2023-07-26 RX ADMIN — SODIUM CHLORIDE 20 ML/HR: 0.9 INJECTION, SOLUTION INTRAVENOUS at 14:51

## 2023-07-26 RX ADMIN — SODIUM CHLORIDE 300 MG: 0.9 INJECTION, SOLUTION INTRAVENOUS at 14:52

## 2023-07-27 PROCEDURE — 88305 TISSUE EXAM BY PATHOLOGIST: CPT | Performed by: STUDENT IN AN ORGANIZED HEALTH CARE EDUCATION/TRAINING PROGRAM

## 2023-08-07 ENCOUNTER — OFFICE VISIT (OUTPATIENT)
Dept: OBGYN CLINIC | Facility: CLINIC | Age: 28
End: 2023-08-07

## 2023-08-07 VITALS
WEIGHT: 147.8 LBS | HEIGHT: 67 IN | SYSTOLIC BLOOD PRESSURE: 124 MMHG | DIASTOLIC BLOOD PRESSURE: 74 MMHG | HEART RATE: 56 BPM | BODY MASS INDEX: 23.2 KG/M2

## 2023-08-07 DIAGNOSIS — Z09 POSTOP CHECK: Primary | ICD-10-CM

## 2023-08-07 DIAGNOSIS — D50.9 IRON DEFICIENCY ANEMIA, UNSPECIFIED IRON DEFICIENCY ANEMIA TYPE: Primary | ICD-10-CM

## 2023-08-07 PROCEDURE — 99213 OFFICE O/P EST LOW 20 MIN: CPT | Performed by: OBSTETRICS & GYNECOLOGY

## 2023-08-07 RX ORDER — SODIUM CHLORIDE 9 MG/ML
20 INJECTION, SOLUTION INTRAVENOUS ONCE
Status: CANCELLED | OUTPATIENT
Start: 2023-08-08

## 2023-08-07 RX ORDER — SODIUM CHLORIDE 9 MG/ML
20 INJECTION, SOLUTION INTRAVENOUS ONCE
Status: CANCELLED | OUTPATIENT
Start: 2023-08-09

## 2023-08-07 NOTE — PATIENT INSTRUCTIONS
Thank you for your confidence in our team.   We appreciate you and welcome your feedback. If you receive a survey from us, please take a few moments to let us know how we are doing.    Sincerely,  Clary Patel, DO

## 2023-08-07 NOTE — PROGRESS NOTES
PROBLEM GYNECOLOGICAL VISIT    Sixto Larsen is a 33 y.o. female who presents today for postop check. Her general medical history has been reviewed and she reports it as follows:    Past Medical History:   Diagnosis Date   • Anemia    • Facial abrasion 2018   • Herpes    • History of transfusion    • Nasal fracture 2018   • Syphilis     resolved     Past Surgical History:   Procedure Laterality Date   • EXAMINATION UNDER ANESTHESIA N/A 2023    Procedure: (EUA); Surgeon: Chrissie Savage DO;  Location: [facility];  Service: Gynecology   • HERNIA REPAIR     • HERNIA REPAIR  1383    umbilical hernia repair - resolved   • FL HYSTEROSCOPY BX ENDOMETRIUM&/POLYPC W/WO D&C N/A 2023    Procedure: (D&C) W/ HYSTEROSCOPY;  Surgeon: Chrissie Savage DO;  Location: [facility];  Service: Gynecology     OB History        0    Para   0    Term   0       0    AB   0    Living   0       SAB   0    IAB   0    Ectopic   0    Multiple   0    Live Births   0               Social History     Tobacco Use   • Smoking status: Some Days     Packs/day: 0.25     Types: Cigarettes   • Smokeless tobacco: Never   • Tobacco comments:     Last smoked 23   Vaping Use   • Vaping Use: Some days   • Substances: Nicotine, THC, CBD   Substance Use Topics   • Alcohol use:  Yes     Alcohol/week: 6.0 - 12.0 standard drinks of alcohol     Types: 6 - 12 Standard drinks or equivalent per week     Comment: 1-2x a week   • Drug use: Yes     Types: Marijuana     Comment: last smoked 23     Social History     Substance and Sexual Activity   Sexual Activity Yes   • Partners: Male   • Birth control/protection: Condom Male       Current Outpatient Medications   Medication Instructions   • acetaminophen (TYLENOL) 500 mg, Oral, Every 6 hours PRN   • escitalopram (LEXAPRO) 5 mg, Oral, Daily   • Prenatal Vit-Fe Fumarate-FA (PRENATAL 1+1 PO) Oral       History of Present Illness:   Patient presents for postop check s/p D&C hysteroscopy with removal of mirena iud with no complaints. Patient states does not want to get another iud insertion wants to wait and see how her menses are now. Review of Systems:  Review of Systems   All other systems reviewed and are negative. Physical Exam:  /74   Pulse 56   Ht 5' 7" (1.702 m)   Wt 67 kg (147 lb 12.8 oz)   LMP 06/28/2023 (Exact Date)   BMI 23.15 kg/m²   Physical Exam  Constitutional:       Appearance: Normal appearance. Neurological:      Mental Status: She is alert. Vitals reviewed. Discussion:  Discuss with patient pathology findings of benign endometrial polyp with endometrial curettings. Recommend if patient should experience heavy menses that there are other options ranging from OCP, vaginal ring, and patch. Assessment:   1. Postop check   2. Endometrial polyp    Plan:   1. Return to office prn. Reviewed with patient that [facility], but that they will not necessarily be reviewed by me immediately. Explained that I will review results at my earliest opportunity and contact patient appropriately.

## 2023-08-14 DIAGNOSIS — D50.9 IRON DEFICIENCY ANEMIA, UNSPECIFIED IRON DEFICIENCY ANEMIA TYPE: Primary | ICD-10-CM

## 2023-08-14 RX ORDER — SODIUM CHLORIDE 9 MG/ML
20 INJECTION, SOLUTION INTRAVENOUS ONCE
Status: CANCELLED | OUTPATIENT
Start: 2023-08-16

## 2023-08-16 ENCOUNTER — HOSPITAL ENCOUNTER (OUTPATIENT)
Dept: INFUSION CENTER | Facility: HOSPITAL | Age: 28
Discharge: HOME | End: 2023-08-16
Attending: INTERNAL MEDICINE
Payer: COMMERCIAL

## 2023-08-16 VITALS
RESPIRATION RATE: 18 BRPM | DIASTOLIC BLOOD PRESSURE: 65 MMHG | TEMPERATURE: 98.1 F | SYSTOLIC BLOOD PRESSURE: 111 MMHG | HEART RATE: 62 BPM

## 2023-08-16 DIAGNOSIS — D50.9 IRON DEFICIENCY ANEMIA, UNSPECIFIED IRON DEFICIENCY ANEMIA TYPE: Primary | ICD-10-CM

## 2023-08-16 PROCEDURE — 96365 THER/PROPH/DIAG IV INF INIT: CPT

## 2023-08-16 RX ORDER — SODIUM CHLORIDE 9 MG/ML
20 INJECTION, SOLUTION INTRAVENOUS ONCE
Status: COMPLETED | OUTPATIENT
Start: 2023-08-16 | End: 2023-08-16

## 2023-08-16 RX ORDER — SODIUM CHLORIDE 9 MG/ML
20 INJECTION, SOLUTION INTRAVENOUS ONCE
Status: CANCELLED | OUTPATIENT
Start: 2023-08-23

## 2023-08-16 RX ADMIN — SODIUM CHLORIDE 20 ML/HR: 0.9 INJECTION, SOLUTION INTRAVENOUS at 13:16

## 2023-08-16 RX ADMIN — IRON SUCROSE 300 MG: 20 INJECTION, SOLUTION INTRAVENOUS at 13:17

## 2023-08-16 NOTE — PROGRESS NOTES
Pt tolerated venofer today without complications. patient due next week and is only available on Wed, but schedule is full, pt is scheduled in 2 weeks, confirmed, pt uses mychart.

## 2023-08-28 DIAGNOSIS — D50.9 IRON DEFICIENCY ANEMIA, UNSPECIFIED IRON DEFICIENCY ANEMIA TYPE: Primary | ICD-10-CM

## 2023-08-28 RX ORDER — SODIUM CHLORIDE 9 MG/ML
20 INJECTION, SOLUTION INTRAVENOUS ONCE
Status: CANCELLED | OUTPATIENT
Start: 2023-08-30

## 2023-08-30 ENCOUNTER — HOSPITAL ENCOUNTER (OUTPATIENT)
Dept: INFUSION CENTER | Facility: HOSPITAL | Age: 28
Discharge: HOME | End: 2023-08-30
Attending: INTERNAL MEDICINE
Payer: COMMERCIAL

## 2023-08-30 VITALS
HEART RATE: 59 BPM | OXYGEN SATURATION: 100 % | DIASTOLIC BLOOD PRESSURE: 69 MMHG | TEMPERATURE: 98.4 F | SYSTOLIC BLOOD PRESSURE: 115 MMHG | RESPIRATION RATE: 18 BRPM

## 2023-08-30 DIAGNOSIS — D50.9 IRON DEFICIENCY ANEMIA, UNSPECIFIED IRON DEFICIENCY ANEMIA TYPE: Primary | ICD-10-CM

## 2023-08-30 PROCEDURE — 96365 THER/PROPH/DIAG IV INF INIT: CPT

## 2023-08-30 RX ORDER — SODIUM CHLORIDE 9 MG/ML
20 INJECTION, SOLUTION INTRAVENOUS ONCE
Status: CANCELLED | OUTPATIENT
Start: 2023-08-30

## 2023-08-30 RX ORDER — SODIUM CHLORIDE 9 MG/ML
20 INJECTION, SOLUTION INTRAVENOUS ONCE
Status: COMPLETED | OUTPATIENT
Start: 2023-08-30 | End: 2023-08-30

## 2023-08-30 RX ADMIN — SODIUM CHLORIDE 300 MG: 0.9 INJECTION, SOLUTION INTRAVENOUS at 10:01

## 2023-08-30 RX ADMIN — SODIUM CHLORIDE 20 ML/HR: 0.9 INJECTION, SOLUTION INTRAVENOUS at 09:58

## 2023-08-30 NOTE — PROGRESS NOTES
Pt tolerated final venofer infusion without difficulty. No s/s reaction noted. AVS declined. Left ambulatory in stable condition.

## 2023-09-13 ENCOUNTER — APPOINTMENT (OUTPATIENT)
Dept: LAB | Facility: CLINIC | Age: 28
End: 2023-09-13
Payer: COMMERCIAL

## 2023-09-13 ENCOUNTER — OFFICE VISIT (OUTPATIENT)
Dept: FAMILY MEDICINE CLINIC | Facility: CLINIC | Age: 28
End: 2023-09-13
Payer: COMMERCIAL

## 2023-09-13 VITALS
SYSTOLIC BLOOD PRESSURE: 110 MMHG | HEART RATE: 58 BPM | BODY MASS INDEX: 22.76 KG/M2 | RESPIRATION RATE: 16 BRPM | OXYGEN SATURATION: 99 % | DIASTOLIC BLOOD PRESSURE: 78 MMHG | TEMPERATURE: 98 F | WEIGHT: 145 LBS | HEIGHT: 67 IN

## 2023-09-13 DIAGNOSIS — N91.2 AMENORRHEA: Primary | ICD-10-CM

## 2023-09-13 DIAGNOSIS — D50.0 IRON DEFICIENCY ANEMIA DUE TO CHRONIC BLOOD LOSS: ICD-10-CM

## 2023-09-13 DIAGNOSIS — N91.2 AMENORRHEA: ICD-10-CM

## 2023-09-13 DIAGNOSIS — D50.9 IRON DEFICIENCY ANEMIA, UNSPECIFIED IRON DEFICIENCY ANEMIA TYPE: ICD-10-CM

## 2023-09-13 LAB
B-HCG SERPL-ACNC: <1 MIU/ML (ref 0–5)
BASOPHILS # BLD AUTO: 0.07 THOUSANDS/ÂΜL (ref 0–0.1)
BASOPHILS NFR BLD AUTO: 1 % (ref 0–1)
EOSINOPHIL # BLD AUTO: 0.25 THOUSAND/ÂΜL (ref 0–0.61)
EOSINOPHIL NFR BLD AUTO: 4 % (ref 0–6)
ERYTHROCYTE [DISTWIDTH] IN BLOOD BY AUTOMATED COUNT: 20.3 % (ref 11.6–15.1)
FERRITIN SERPL-MCNC: 64 NG/ML (ref 11–307)
HCT VFR BLD AUTO: 41.9 % (ref 34.8–46.1)
HGB BLD-MCNC: 13.3 G/DL (ref 11.5–15.4)
IMM GRANULOCYTES # BLD AUTO: 0.01 THOUSAND/UL (ref 0–0.2)
IMM GRANULOCYTES NFR BLD AUTO: 0 % (ref 0–2)
IRON SATN MFR SERPL: 24 % (ref 15–50)
IRON SERPL-MCNC: 80 UG/DL (ref 50–212)
LYMPHOCYTES # BLD AUTO: 1.69 THOUSANDS/ÂΜL (ref 0.6–4.47)
LYMPHOCYTES NFR BLD AUTO: 27 % (ref 14–44)
MCH RBC QN AUTO: 27.1 PG (ref 26.8–34.3)
MCHC RBC AUTO-ENTMCNC: 31.7 G/DL (ref 31.4–37.4)
MCV RBC AUTO: 85 FL (ref 82–98)
MONOCYTES # BLD AUTO: 0.57 THOUSAND/ÂΜL (ref 0.17–1.22)
MONOCYTES NFR BLD AUTO: 9 % (ref 4–12)
NEUTROPHILS # BLD AUTO: 3.72 THOUSANDS/ÂΜL (ref 1.85–7.62)
NEUTS SEG NFR BLD AUTO: 59 % (ref 43–75)
NRBC BLD AUTO-RTO: 0 /100 WBCS
PLATELET # BLD AUTO: 259 THOUSANDS/UL (ref 149–390)
PMV BLD AUTO: 11.7 FL (ref 8.9–12.7)
RBC # BLD AUTO: 4.91 MILLION/UL (ref 3.81–5.12)
TIBC SERPL-MCNC: 340 UG/DL (ref 250–450)
UIBC SERPL-MCNC: 260 UG/DL (ref 155–355)
WBC # BLD AUTO: 6.31 THOUSAND/UL (ref 4.31–10.16)

## 2023-09-13 PROCEDURE — 85025 COMPLETE CBC W/AUTO DIFF WBC: CPT

## 2023-09-13 PROCEDURE — 36415 COLL VENOUS BLD VENIPUNCTURE: CPT

## 2023-09-13 PROCEDURE — 82728 ASSAY OF FERRITIN: CPT

## 2023-09-13 PROCEDURE — 99213 OFFICE O/P EST LOW 20 MIN: CPT | Performed by: FAMILY MEDICINE

## 2023-09-13 PROCEDURE — 83540 ASSAY OF IRON: CPT

## 2023-09-13 PROCEDURE — 83550 IRON BINDING TEST: CPT

## 2023-09-13 PROCEDURE — 84702 CHORIONIC GONADOTROPIN TEST: CPT

## 2023-09-13 NOTE — PROGRESS NOTES
Name: Xavi Josue      : 1995      MRN: [id number]  Encounter Provider: Chadwick Harvey MD  Encounter Date: 2023   Encounter department: [address] [place] [address],[address]     1. Amenorrhea  -     hCG, quantitative; Future    2. Iron deficiency anemia, unspecified iron deficiency anemia type  -     CBC; Future; Expected date: 2024           Subjective      HPI     Pt is here by herself. Tiredness and sleepy, breast tender and headache recently. LMP 2023. Urine pregnancy test was negative last week at home. Pt would like to do blood test.   Pt had D&C and removal IUD in 2023. FU OBGYN. Hx of iron deficiency anemia. FU hem/onc. Got iron infusion 2 weeks ago. Depression/anxiety--- stable. She is on lexapro 5mg QD. Did not see therapist.        Review of Systems   Constitutional: Positive for fatigue. Negative for appetite change, chills and fever. HENT: Negative for congestion, ear pain, sinus pain and sore throat. Eyes: Negative for discharge and itching. Respiratory: Negative for apnea, cough, chest tightness, shortness of breath and wheezing. Cardiovascular: Negative for chest pain, palpitations and leg swelling. Gastrointestinal: Negative for abdominal pain, anal bleeding, constipation, diarrhea, nausea and vomiting. Endocrine: Negative for cold intolerance, heat intolerance and polyuria. Genitourinary: Negative for difficulty urinating and dysuria. Musculoskeletal: Negative for arthralgias, back pain and myalgias. Skin: Negative for rash. Neurological: Positive for headaches. Negative for dizziness. Psychiatric/Behavioral: Negative for agitation.        Current Outpatient Medications on File Prior to Visit   Medication Sig   • escitalopram (LEXAPRO) 5 mg tablet Take 1 tablet (5 mg total) by mouth daily   • Prenatal Vit-Fe Fumarate-FA (PRENATAL 1+1 PO) Take by mouth   • acetaminophen (TYLENOL) 500 mg tablet Take 500 mg by mouth every 6 (six) hours as needed for mild pain (Patient not taking: Reported on 9/13/2023)       Objective     /78   Pulse 58   Temp 98 °F (36.7 °C) (Temporal)   Resp 16   Ht 5' 7" (1.702 m)   Wt 65.8 kg (145 lb)   SpO2 99%   BMI 22.71 kg/m²     Physical Exam  Constitutional:       General: She is not in acute distress. Appearance: She is well-developed. HENT:      Head: Normocephalic. Eyes:      General:         Right eye: No discharge. Left eye: No discharge. Conjunctiva/sclera: Conjunctivae normal.   Neck:      Thyroid: No thyromegaly. Cardiovascular:      Rate and Rhythm: Normal rate and regular rhythm. Heart sounds: Normal heart sounds. No murmur heard. No friction rub. No gallop. Pulmonary:      Effort: Pulmonary effort is normal. No respiratory distress. Breath sounds: Normal breath sounds. No wheezing or rales. Chest:      Chest wall: No tenderness. Abdominal:      General: Bowel sounds are normal. There is no distension. Palpations: Abdomen is soft. There is no mass. Tenderness: There is no abdominal tenderness. There is no guarding or rebound. Musculoskeletal:         General: Normal range of motion. Cervical back: Normal range of motion and neck supple. No tenderness. Lymphadenopathy:      Cervical: No cervical adenopathy. Neurological:      Mental Status: She is alert.        Yessi Walker MD

## 2023-09-14 ENCOUNTER — TELEPHONE (OUTPATIENT)
Dept: FAMILY MEDICINE CLINIC | Facility: CLINIC | Age: 28
End: 2023-09-14

## 2023-09-14 NOTE — TELEPHONE ENCOUNTER
Hi, this is [employer]. I'm pretty sure Doctor Sophia Medrano had reached out to me. I'm just returning the phone call. If I don't answer the phone again, you can leave a voicemail. My phone number is [phone]. Thank you. Bye.    -pt request call/ voicemail with results.

## 2023-09-14 NOTE — TELEPHONE ENCOUNTER
Left message----- Message from Chadwick Harvey MD sent at 9/14/2023  9:00 AM EDT -----  hCG negative  Hg 13.3 normal  Iron panel normal

## 2023-10-03 NOTE — PROGRESS NOTES
Subjective:     Thomas Lunsford is a 33 y.o.  female who presents hx of heavy menses s/p IUD placement and removal and D&C with benign findings. She underwent a D&C for suspected endometrial polyp and her IUD has to be removed at that time to complete the procedure. Since then her menses have become heavy again and last month was her heaviest bleeding. Objective:    Vitals: Blood pressure 113/71, pulse 76, weight 65.8 kg (145 lb), last menstrual period 2023, not currently breastfeeding. Body mass index is 22.71 kg/m². Physical Exam  Vitals reviewed. Constitutional:       Appearance: Normal appearance. Cardiovascular:      Rate and Rhythm: Normal rate. Pulmonary:      Effort: Pulmonary effort is normal.   Skin:     General: Skin is warm and dry. Neurological:      Mental Status: She is alert and oriented to person, place, and time. Assessment/Plan:  Menorrhagia  -Will plan to trial TXA during her menses. Prescription was provided. IUD  -Patient is interested in having her IUD replaced as her bleeding was better controlled with the IUD. Since her IUD was removed in order to complete her D&C procedure paperwork was filled out and patient will return to the office for placement.         Gene Moreno MD  10/4/2023  3:42 PM

## 2023-10-04 ENCOUNTER — OFFICE VISIT (OUTPATIENT)
Dept: OBGYN CLINIC | Facility: CLINIC | Age: 28
End: 2023-10-04

## 2023-10-04 VITALS
BODY MASS INDEX: 22.71 KG/M2 | WEIGHT: 145 LBS | DIASTOLIC BLOOD PRESSURE: 71 MMHG | SYSTOLIC BLOOD PRESSURE: 113 MMHG | HEART RATE: 76 BPM

## 2023-10-04 DIAGNOSIS — N92.0 MENORRHAGIA WITH REGULAR CYCLE: Primary | ICD-10-CM

## 2023-10-04 DIAGNOSIS — T83.9XXD COMPLICATION OF INTRAUTERINE DEVICE (IUD), UNSPECIFIED COMPLICATION, SUBSEQUENT ENCOUNTER: ICD-10-CM

## 2023-10-04 PROCEDURE — 99214 OFFICE O/P EST MOD 30 MIN: CPT | Performed by: OBSTETRICS & GYNECOLOGY

## 2023-10-04 RX ORDER — TRANEXAMIC ACID 650 MG/1
650 TABLET ORAL 3 TIMES DAILY
Qty: 15 TABLET | Refills: 3 | Status: SHIPPED | OUTPATIENT
Start: 2023-10-04 | End: 2023-10-09

## 2023-10-11 ENCOUNTER — TELEPHONE (OUTPATIENT)
Dept: PSYCHIATRY | Facility: CLINIC | Age: 28
End: 2023-10-11

## 2023-10-11 NOTE — TELEPHONE ENCOUNTER
Attempted to call patient to discuss the referral for talk therapy services with Cara Espino, number was unavailable at this time

## 2023-10-31 ENCOUNTER — PROCEDURE VISIT (OUTPATIENT)
Dept: OBGYN CLINIC | Facility: CLINIC | Age: 28
End: 2023-10-31

## 2023-10-31 VITALS
HEART RATE: 73 BPM | BODY MASS INDEX: 22.71 KG/M2 | WEIGHT: 145 LBS | SYSTOLIC BLOOD PRESSURE: 117 MMHG | DIASTOLIC BLOOD PRESSURE: 74 MMHG

## 2023-10-31 DIAGNOSIS — Z30.430 ENCOUNTER FOR IUD INSERTION: ICD-10-CM

## 2023-10-31 DIAGNOSIS — N92.0 MENORRHAGIA WITH REGULAR CYCLE: ICD-10-CM

## 2023-10-31 DIAGNOSIS — Z30.09 UNWANTED FERTILITY: ICD-10-CM

## 2023-10-31 DIAGNOSIS — Z30.09 UNWANTED FERTILITY: Primary | ICD-10-CM

## 2023-10-31 LAB — SL AMB POCT URINE HCG: NEGATIVE

## 2023-10-31 RX ORDER — LEVONORGESTREL 52 MG/1
INTRAUTERINE DEVICE INTRAUTERINE
Qty: 1 INTRA UTERINE DEVICE | Refills: 0 | Status: SHIPPED | OUTPATIENT
Start: 2023-10-31

## 2023-10-31 NOTE — PROGRESS NOTES
Iud insertions    Date/Time: 10/31/2023 8:30 AM    Performed by: JAKY Stanley  Authorized by: JAKY Stanley    Other Assisting Provider: Yes (comment) (Dr. Anthony Beal observing)    Verbal consent obtained?: Yes    Written consent obtained?: Yes    Risks and benefits: Risks, benefits and alternatives were discussed    Consent given by:  Patient  Time Out:     Time out: Immediately prior to the procedure a time out was called    Patient states understanding of procedure being performed: Yes    Patient's understanding of procedure matches consent: Yes    Procedure consent matches procedure scheduled: Yes    Required items: Required blood products, implants, devices and special equipment available    Patient identity confirmed:  Verbally with patient  Procedure:     Pelvic exam performed: yes      Negative urine pregnancy test: yes      Cervix cleaned and prepped: yes      Speculum placed in vagina: yes      Tenaculum applied to cervix: yes      Uterus sounded: yes      Uterus sound depth (cm):  8    IUD inserted with no complications: yes      IUD type:  Mirena    Strings trimmed: yes    Post-procedure:     Patient tolerated procedure well: yes      Patient will follow up after next period: yes

## 2023-10-31 NOTE — PATIENT INSTRUCTIONS
Thank you for your confidence in our team.   We appreciate you and welcome your feedback. If you receive a survey from us, please take a few moments to let us know how we are doing.    Sincerely,  Tisha Burns

## 2023-11-20 DIAGNOSIS — F32.A ANXIETY AND DEPRESSION: ICD-10-CM

## 2023-11-20 DIAGNOSIS — F41.9 ANXIETY AND DEPRESSION: ICD-10-CM

## 2023-11-20 RX ORDER — ESCITALOPRAM OXALATE 5 MG/1
5 TABLET ORAL DAILY
Qty: 30 TABLET | Refills: 5 | Status: SHIPPED | OUTPATIENT
Start: 2023-11-20

## 2023-11-27 ENCOUNTER — OFFICE VISIT (OUTPATIENT)
Dept: OBGYN CLINIC | Facility: CLINIC | Age: 28
End: 2023-11-27

## 2023-11-27 DIAGNOSIS — Z53.21 PATIENT LEFT WITHOUT BEING SEEN: Primary | ICD-10-CM

## 2024-02-19 ENCOUNTER — RA CDI HCC (OUTPATIENT)
Dept: OTHER | Facility: HOSPITAL | Age: 29
End: 2024-02-19

## 2024-02-19 NOTE — PROGRESS NOTES
HCC coding opportunities          Chart Reviewed number of suggestions sent to Provider: 1   Depression  please further classify as per ICD 10 coding guidelines.     This is a reminder to address (resolve/update/assess) ALL HCC (risk adjustment) codes as found on active problem list for 2024 as patient scores reset to zero ANSON.  Also, just a reminder to please review and assess all other chronic conditions for 2024  Patients Insurance        Commercial Insurance: [employer]

## 2024-02-26 ENCOUNTER — OFFICE VISIT (OUTPATIENT)
Dept: FAMILY MEDICINE CLINIC | Facility: CLINIC | Age: 29
End: 2024-02-26
Payer: COMMERCIAL

## 2024-02-26 VITALS
DIASTOLIC BLOOD PRESSURE: 76 MMHG | HEART RATE: 73 BPM | TEMPERATURE: 97.9 F | OXYGEN SATURATION: 97 % | RESPIRATION RATE: 16 BRPM | HEIGHT: 67 IN | SYSTOLIC BLOOD PRESSURE: 118 MMHG | WEIGHT: 151.4 LBS | BODY MASS INDEX: 23.76 KG/M2

## 2024-02-26 DIAGNOSIS — D50.0 IRON DEFICIENCY ANEMIA DUE TO CHRONIC BLOOD LOSS: ICD-10-CM

## 2024-02-26 DIAGNOSIS — F41.9 ANXIETY AND DEPRESSION: ICD-10-CM

## 2024-02-26 DIAGNOSIS — F32.A ANXIETY AND DEPRESSION: ICD-10-CM

## 2024-02-26 DIAGNOSIS — R53.83 OTHER FATIGUE: Primary | ICD-10-CM

## 2024-02-26 PROCEDURE — 99214 OFFICE O/P EST MOD 30 MIN: CPT | Performed by: FAMILY MEDICINE

## 2024-02-26 RX ORDER — TRANEXAMIC ACID 650 MG/1
650 TABLET ORAL 3 TIMES DAILY
COMMUNITY
Start: 2024-01-10

## 2024-02-26 NOTE — PROGRESS NOTES
Name: Alisson Wolf      : 1995      MRN: [id number]  Encounter Provider: Jose Ochoa MD  Encounter Date: 2024   Encounter department: [facility]  Chief Complaint   Patient presents with    Follow-up     Discuss questions about hemoglobin wants to get that checked      Health Maintenance   Topic Date Due    Hepatitis C Screening  Never done    Pneumococcal Vaccine: Pediatrics (0 to 5 Years) and At-Risk Patients (6 to 64 Years) (1 of 2 - PCV) Never done    HIV Screening  Never done    Influenza Vaccine (1) Never done    COVID-19 Vaccine (1 - - season) Never done    Annual Physical  2024    Depression Screening  10/31/2024    Cervical Cancer Screening  10/13/2025    DTaP,Tdap,and Td Vaccines (7 - Td or Tdap) 2028    Zoster Vaccine (1 of 2) 2044    HIB Vaccine  Completed    IPV Vaccine  Completed    HPV Vaccine  Completed    Hepatitis A Vaccine  Aged Out    Meningococcal ACWY Vaccine  Aged Out       Assessment & Plan     1. Other fatigue  Assessment & Plan:  Multifactors including anemia, thyroid issue, sleep issue and anxiety/depression.       2. Iron deficiency anemia due to chronic blood loss  -     CBC and differential; Future; Expected date: 2024  -     TSH, 3rd generation with Free T4 reflex; Future; Expected date: 2024  -     Iron Panel (Includes Ferritin, Iron Sat%, Iron, and TIBC); Future    3. Anxiety and depression  Assessment & Plan:   Pt will try to increase lexapro to 10mg QD. If she feels better, will let me know.           Tobacco Cessation Counseling: Tobacco cessation counseling was provided. The patient is sincerely urged to quit consumption of tobacco. She is not ready to quit tobacco. Medication options and side effects of medication discussed. Patient refused medication.         Subjective      HPI    Pt is here by herself.     Feels tired for weeks.   Hx of iron deficiency anemia.   Got IUD in 10/2023, sometimes heavy  "mentrual period.   Got tranexamic as needed per GYN.     Anxiety/depression---She is on lexapro 5mg QD in the morning. Still cannot stay asleep, wake up several times at night. She would like to try higher dose.       Review of Systems   Constitutional:  Positive for fatigue. Negative for appetite change, chills and fever.   HENT:  Negative for congestion, ear pain, sinus pain and sore throat.    Eyes:  Negative for discharge and itching.   Respiratory:  Negative for apnea, cough, chest tightness, shortness of breath and wheezing.    Cardiovascular:  Negative for chest pain, palpitations and leg swelling.   Gastrointestinal:  Negative for abdominal pain, anal bleeding, constipation, diarrhea, nausea and vomiting.   Endocrine: Negative for cold intolerance, heat intolerance and polyuria.   Genitourinary:  Negative for difficulty urinating and dysuria.   Musculoskeletal:  Negative for arthralgias, back pain and myalgias.   Skin:  Negative for rash.   Neurological:  Negative for dizziness and headaches.   Psychiatric/Behavioral:  Positive for sleep disturbance. Negative for agitation.        Current Outpatient Medications on File Prior to Visit   Medication Sig    acetaminophen (TYLENOL) 500 mg tablet Take 500 mg by mouth every 6 (six) hours as needed for mild pain    escitalopram (LEXAPRO) 5 mg tablet TAKE 1 TABLET (5 MG TOTAL) BY MOUTH DAILY.    Levonorgestrel (Mirena, 52 MG,) 20 MCG/DAY IUD 1 INTRA UTERINE DEVICE BY INTRAUTERINE ROUTE ONCE FOR 1 DOSE    Tranexamic Acid 650 MG TABS Take 650 mg by mouth 3 (three) times a day    Prenatal Vit-Fe Fumarate-FA (PRENATAL 1+1 PO) Take by mouth (Patient not taking: Reported on 10/4/2023)       Objective     /76 (BP Location: Left arm, Patient Position: Sitting, Cuff Size: Adult)   Pulse 73   Temp 97.9 °F (36.6 °C) (Tympanic)   Resp 16   Ht 5' 7\" (1.702 m)   Wt 68.7 kg (151 lb 6.4 oz)   SpO2 97%   BMI 23.71 kg/m²     Physical Exam  Constitutional:       General: " She is not in acute distress.     Appearance: She is well-developed.   HENT:      Head: Normocephalic.   Eyes:      General:         Right eye: No discharge.         Left eye: No discharge.      Conjunctiva/sclera: Conjunctivae normal.   Neck:      Thyroid: No thyromegaly.   Cardiovascular:      Rate and Rhythm: Normal rate and regular rhythm.      Heart sounds: Normal heart sounds. No murmur heard.     No friction rub. No gallop.   Pulmonary:      Effort: Pulmonary effort is normal. No respiratory distress.      Breath sounds: Normal breath sounds. No wheezing or rales.   Chest:      Chest wall: No tenderness.   Abdominal:      General: Bowel sounds are normal. There is no distension.      Palpations: Abdomen is soft. There is no mass.      Tenderness: There is no abdominal tenderness. There is no guarding or rebound.   Musculoskeletal:         General: No tenderness or deformity. Normal range of motion.      Cervical back: Normal range of motion.   Lymphadenopathy:      Cervical: No cervical adenopathy.   Neurological:      Mental Status: She is alert.       Jose Ochoa MD

## 2024-02-28 ENCOUNTER — APPOINTMENT (OUTPATIENT)
Dept: LAB | Facility: CLINIC | Age: 29
End: 2024-02-28
Payer: COMMERCIAL

## 2024-02-28 ENCOUNTER — TELEPHONE (OUTPATIENT)
Dept: FAMILY MEDICINE CLINIC | Facility: CLINIC | Age: 29
End: 2024-02-28

## 2024-02-28 DIAGNOSIS — D50.0 IRON DEFICIENCY ANEMIA DUE TO CHRONIC BLOOD LOSS: ICD-10-CM

## 2024-02-28 DIAGNOSIS — D50.9 IRON DEFICIENCY ANEMIA, UNSPECIFIED IRON DEFICIENCY ANEMIA TYPE: ICD-10-CM

## 2024-02-28 LAB
BASOPHILS # BLD AUTO: 0.08 THOUSANDS/ÂΜL (ref 0–0.1)
BASOPHILS NFR BLD AUTO: 2 % (ref 0–1)
EOSINOPHIL # BLD AUTO: 0.16 THOUSAND/ÂΜL (ref 0–0.61)
EOSINOPHIL NFR BLD AUTO: 4 % (ref 0–6)
ERYTHROCYTE [DISTWIDTH] IN BLOOD BY AUTOMATED COUNT: 19.1 % (ref 11.6–15.1)
FERRITIN SERPL-MCNC: 2 NG/ML (ref 11–307)
HCT VFR BLD AUTO: 26.1 % (ref 34.8–46.1)
HGB BLD-MCNC: 7 G/DL (ref 11.5–15.4)
IMM GRANULOCYTES # BLD AUTO: 0.01 THOUSAND/UL (ref 0–0.2)
IMM GRANULOCYTES NFR BLD AUTO: 0 % (ref 0–2)
IRON SERPL-MCNC: <10 UG/DL (ref 50–212)
LYMPHOCYTES # BLD AUTO: 1.27 THOUSANDS/ÂΜL (ref 0.6–4.47)
LYMPHOCYTES NFR BLD AUTO: 29 % (ref 14–44)
MCH RBC QN AUTO: 17.7 PG (ref 26.8–34.3)
MCHC RBC AUTO-ENTMCNC: 26.8 G/DL (ref 31.4–37.4)
MCV RBC AUTO: 66 FL (ref 82–98)
MONOCYTES # BLD AUTO: 0.64 THOUSAND/ÂΜL (ref 0.17–1.22)
MONOCYTES NFR BLD AUTO: 14 % (ref 4–12)
NEUTROPHILS # BLD AUTO: 2.28 THOUSANDS/ÂΜL (ref 1.85–7.62)
NEUTS SEG NFR BLD AUTO: 51 % (ref 43–75)
NRBC BLD AUTO-RTO: 0 /100 WBCS
PLATELET # BLD AUTO: 197 THOUSANDS/UL (ref 149–390)
PMV BLD AUTO: 10.8 FL (ref 8.9–12.7)
RBC # BLD AUTO: 3.96 MILLION/UL (ref 3.81–5.12)
TIBC SERPL-MCNC: <438 UG/DL (ref 250–450)
TSH SERPL DL<=0.05 MIU/L-ACNC: 3.08 UIU/ML (ref 0.45–4.5)
UIBC SERPL-MCNC: 428 UG/DL (ref 155–355)
WBC # BLD AUTO: 4.44 THOUSAND/UL (ref 4.31–10.16)

## 2024-02-28 PROCEDURE — 84443 ASSAY THYROID STIM HORMONE: CPT

## 2024-02-28 PROCEDURE — 36415 COLL VENOUS BLD VENIPUNCTURE: CPT

## 2024-02-28 PROCEDURE — 82728 ASSAY OF FERRITIN: CPT

## 2024-02-28 PROCEDURE — 85025 COMPLETE CBC W/AUTO DIFF WBC: CPT

## 2024-02-28 PROCEDURE — 83550 IRON BINDING TEST: CPT

## 2024-02-28 PROCEDURE — 83540 ASSAY OF IRON: CPT

## 2024-02-28 NOTE — TELEPHONE ENCOUNTER
Left message for patient to go to ER----- Message from Jose Ochoa MD sent at 2/28/2024  4:42 PM EST -----  Tried to call pt but nobody answer. Hemoglobin 7.0 low Please ask pt to go to ER, need transfusion.

## 2024-02-29 NOTE — TELEPHONE ENCOUNTER
Called patient's mom. States patient at work now that is why she is not answer. Told patient's mother to let patient know the result and that PCP recommends her to go to the ER for transfusion

## 2024-03-05 ENCOUNTER — HOSPITAL ENCOUNTER (EMERGENCY)
Facility: HOSPITAL | Age: 29
Discharge: HOME | End: 2024-03-05
Attending: EMERGENCY MEDICINE
Payer: COMMERCIAL

## 2024-03-05 ENCOUNTER — TELEPHONE (OUTPATIENT)
Dept: HEMATOLOGY ONCOLOGY | Facility: CLINIC | Age: 29
End: 2024-03-05

## 2024-03-05 VITALS
OXYGEN SATURATION: 90 % | TEMPERATURE: 98.8 F | HEART RATE: 57 BPM | RESPIRATION RATE: 16 BRPM | SYSTOLIC BLOOD PRESSURE: 104 MMHG | DIASTOLIC BLOOD PRESSURE: 49 MMHG

## 2024-03-05 DIAGNOSIS — D50.0 IRON DEFICIENCY ANEMIA DUE TO CHRONIC BLOOD LOSS: ICD-10-CM

## 2024-03-05 DIAGNOSIS — D64.9 SYMPTOMATIC ANEMIA: Primary | ICD-10-CM

## 2024-03-05 LAB
ALBUMIN SERPL BCP-MCNC: 4.4 G/DL (ref 3.5–5)
ALP SERPL-CCNC: 50 U/L (ref 34–104)
ALT SERPL W P-5'-P-CCNC: 10 U/L (ref 7–52)
ANION GAP SERPL CALCULATED.3IONS-SCNC: 5 MMOL/L
APTT PPP: 30 SECONDS (ref 23–37)
AST SERPL W P-5'-P-CCNC: 14 U/L (ref 13–39)
BASOPHILS # BLD AUTO: 0.06 THOUSANDS/ÂΜL (ref 0–0.1)
BASOPHILS NFR BLD AUTO: 2 % (ref 0–1)
BILIRUB SERPL-MCNC: 0.39 MG/DL (ref 0.2–1)
BUN SERPL-MCNC: 10 MG/DL (ref 5–25)
CALCIUM SERPL-MCNC: 9.1 MG/DL (ref 8.4–10.2)
CHLORIDE SERPL-SCNC: 106 MMOL/L (ref 96–108)
CO2 SERPL-SCNC: 24 MMOL/L (ref 21–32)
CREAT SERPL-MCNC: 0.8 MG/DL (ref 0.6–1.3)
EOSINOPHIL # BLD AUTO: 0.12 THOUSAND/ÂΜL (ref 0–0.61)
EOSINOPHIL NFR BLD AUTO: 4 % (ref 0–6)
ERYTHROCYTE [DISTWIDTH] IN BLOOD BY AUTOMATED COUNT: 19.8 % (ref 11.6–15.1)
EXT PREGNANCY TEST URINE: NEGATIVE
EXT. CONTROL: NORMAL
GFR SERPL CREATININE-BSD FRML MDRD: 99 ML/MIN/1.73SQ M
GLUCOSE SERPL-MCNC: 86 MG/DL (ref 65–140)
HCT VFR BLD AUTO: 27.3 % (ref 34.8–46.1)
HGB BLD-MCNC: 7.1 G/DL (ref 11.5–15.4)
IMM GRANULOCYTES # BLD AUTO: 0.01 THOUSAND/UL (ref 0–0.2)
IMM GRANULOCYTES NFR BLD AUTO: 0 % (ref 0–2)
INR PPP: 1.05 (ref 0.84–1.19)
LYMPHOCYTES # BLD AUTO: 0.98 THOUSANDS/ÂΜL (ref 0.6–4.47)
LYMPHOCYTES NFR BLD AUTO: 30 % (ref 14–44)
MCH RBC QN AUTO: 17.5 PG (ref 26.8–34.3)
MCHC RBC AUTO-ENTMCNC: 26 G/DL (ref 31.4–37.4)
MCV RBC AUTO: 67 FL (ref 82–98)
MONOCYTES # BLD AUTO: 0.44 THOUSAND/ÂΜL (ref 0.17–1.22)
MONOCYTES NFR BLD AUTO: 13 % (ref 4–12)
NEUTROPHILS # BLD AUTO: 1.67 THOUSANDS/ÂΜL (ref 1.85–7.62)
NEUTS SEG NFR BLD AUTO: 51 % (ref 43–75)
NRBC BLD AUTO-RTO: 0 /100 WBCS
PLATELET # BLD AUTO: 183 THOUSANDS/UL (ref 149–390)
POTASSIUM SERPL-SCNC: 3.9 MMOL/L (ref 3.5–5.3)
PROT SERPL-MCNC: 7.2 G/DL (ref 6.4–8.4)
PROTHROMBIN TIME: 14.3 SECONDS (ref 11.6–14.5)
RBC # BLD AUTO: 4.05 MILLION/UL (ref 3.81–5.12)
SODIUM SERPL-SCNC: 135 MMOL/L (ref 135–147)
WBC # BLD AUTO: 3.28 THOUSAND/UL (ref 4.31–10.16)

## 2024-03-05 PROCEDURE — 85025 COMPLETE CBC W/AUTO DIFF WBC: CPT | Performed by: EMERGENCY MEDICINE

## 2024-03-05 PROCEDURE — 36415 COLL VENOUS BLD VENIPUNCTURE: CPT | Performed by: EMERGENCY MEDICINE

## 2024-03-05 PROCEDURE — 85610 PROTHROMBIN TIME: CPT | Performed by: EMERGENCY MEDICINE

## 2024-03-05 PROCEDURE — 93005 ELECTROCARDIOGRAM TRACING: CPT

## 2024-03-05 PROCEDURE — 99283 EMERGENCY DEPT VISIT LOW MDM: CPT

## 2024-03-05 PROCEDURE — 80053 COMPREHEN METABOLIC PANEL: CPT | Performed by: EMERGENCY MEDICINE

## 2024-03-05 PROCEDURE — 85730 THROMBOPLASTIN TIME PARTIAL: CPT | Performed by: EMERGENCY MEDICINE

## 2024-03-05 PROCEDURE — 99285 EMERGENCY DEPT VISIT HI MDM: CPT | Performed by: EMERGENCY MEDICINE

## 2024-03-05 PROCEDURE — 81025 URINE PREGNANCY TEST: CPT | Performed by: EMERGENCY MEDICINE

## 2024-03-05 RX ORDER — FERROUS SULFATE 325(65) MG
325 TABLET ORAL DAILY
Qty: 30 TABLET | Refills: 0 | Status: SHIPPED | OUTPATIENT
Start: 2024-03-05

## 2024-03-05 NOTE — ED ATTENDING ATTESTATION
3/5/2024  IAmalia MD, saw and evaluated the patient. I have discussed the patient with the resident/non-physician practitioner and agree with the resident's/non-physician practitioner's findings, Plan of Care, and MDM as documented in the resident's/non-physician practitioner's note, except where noted. All available labs and Radiology studies were reviewed.  I was present for key portions of any procedure(s) performed by the resident/non-physician practitioner and I was immediately available to provide assistance.       At this point I agree with the current assessment done in the Emergency Department.  I have conducted an independent evaluation of this patient a history and physical is as follows:    This is a 28-year-old female with a history of chronic iron deficiency anemia, menometrorrhagia with a previous history of iron and blood transfusions, presenting to the ED today for a complaint of generalized weakness.  She also has had some fatigue.  She has some exertional dyspnea, states that she has been on her menstrual cycle for the past week going through a pad every few hours.  She states that she has some lightheadedness with change in position.  She denies any infectious symptoms.  She denies any chest pain pressure discomfort.  She denies any other associated symptoms.  On exam she appears to be well-hydrated, with slightly pale conjunctive a, and without any other significant abnormalities.  She is bradycardic, which is chronic for her.  Her differential diagnosis includes: Symptomatic anemia versus electrolyte abnormality versus viral URI versus other.  Patient had a CBC showing a hemoglobin of 7.1, and a metabolic panel which was unremarkable.  Her coags were unremarkable and she was not found to be pregnant.  Patient was ambulated in the ED, and with her being normotensive, without any hypoxemia, profound tachycardia, she was deemed stable for discharge.  She will not receive a  transfusion today.  The management plan was discussed in detail with the patient at bedside and all questions were answered. Strict ED return instructions were discussed at bedside. Prior to discharge, both verbal and written instructions were provided. We discussed the signs and symptoms that should prompt the patient to return to the ED. All questions were answered and the patient was comfortable with the plan of care and discharged home. The patient agrees to return to the Emergency Department for concerns and/or progression of illness.    She will follow-up with OB/GYN, hematology, and her PCP as an outpatient.  ED Course         Critical Care Time  Procedures

## 2024-03-05 NOTE — ED PROVIDER NOTES
History  Chief Complaint   Patient presents with    Abnormal Lab     Sent by pcp for low iron, pt states feels light headed for the last month     28-year-old female with history of chronic iron deficiency anemia (likely secondary to menorrhagia, with history of transfusion and iron infusions in the past) presenting to the ED with complaints of generalized weakness/symptomatic anemia.  Patient had lab work done with her PCP on  showing hemoglobin of 7, with iron less than 10 and was referred to the ED for further evaluation and management.  Patient reports that she has been on her menstrual cycle for the past week, soaking through 1 pad every few hours, and in the past few months has been as high as soaking through 1 tampon per hour.  At this time, patient reports feeling somewhat lightheaded and dizzy, but denies any headache, syncope, falls or change in mental status.  Last blood transfusion was not winter 2022, last iron infusion was in 2023.  Patient reports current IUD use, which was placed in 2023.  Denies fever, chills, cough, congestion, sore throat, hemoptysis, gingival bleeding, epistaxis, hemoptysis chest pain, shortness of breath, abdominal pain, nausea, vomiting, diarrhea, hematuria, bloody/tarry stool, vaginal pain/discharge, leg pain, leg swelling.        Prior to Admission Medications   Prescriptions Last Dose Informant Patient Reported? Taking?   Levonorgestrel (Mirena, 52 MG,) 20 MCG/DAY IUD   No No   Si INTRA UTERINE DEVICE BY INTRAUTERINE ROUTE ONCE FOR 1 DOSE   Prenatal Vit-Fe Fumarate-FA (PRENATAL 1+1 PO)   Yes No   Sig: Take by mouth   Patient not taking: Reported on 10/4/2023   Tranexamic Acid 650 MG TABS   Yes No   Sig: Take 650 mg by mouth 3 (three) times a day   acetaminophen (TYLENOL) 500 mg tablet  Self Yes No   Sig: Take 500 mg by mouth every 6 (six) hours as needed for mild pain   escitalopram (LEXAPRO) 5 mg tablet   No No   Sig: TAKE 1 TABLET (5 MG TOTAL) BY  MOUTH DAILY.      Facility-Administered Medications: None       Past Medical History:   Diagnosis Date    Anemia     Facial abrasion 08/22/2018    Herpes     History of transfusion     Nasal fracture 08/22/2018    Syphilis     resolved       Past Surgical History:   Procedure Laterality Date    EXAMINATION UNDER ANESTHESIA N/A 7/24/2023    Procedure: (EUA);  Surgeon: Yardlie Toussaint-Foster, DO;  Location: [facility];  Service: Gynecology    HERNIA REPAIR      HERNIA REPAIR  1999    umbilical hernia repair - resolved    KS HYSTEROSCOPY BX ENDOMETRIUM&/POLYPC W/WO D&C N/A 7/24/2023    Procedure: (D&C) W/ HYSTEROSCOPY;  Surgeon: Yardlie Toussaint-Foster, DO;  Location: [facility];  Service: Gynecology       Family History   Problem Relation Age of Onset    Thyroid disease Mother     Asthma Mother     Coronary artery disease Mother     Hypertension Mother     No Known Problems Brother     No Known Problems Brother     Diabetes Maternal Grandmother     Hypertension Maternal Grandmother     Stroke Maternal Grandfather         syndrome     I have reviewed and agree with the history as documented.    E-Cigarette/Vaping    E-Cigarette Use Current Some Day User     Comments couple times a week      E-Cigarette/Vaping Substances    Nicotine Yes     THC Yes     CBD Yes     Flavoring No     Other No     Unknown No      Social History     Tobacco Use    Smoking status: Some Days     Current packs/day: 0.25     Types: Cigarettes    Smokeless tobacco: Never    Tobacco comments:     Last smoked 7/23/23   Vaping Use    Vaping status: Some Days    Substances: Nicotine, THC, CBD   Substance Use Topics    Alcohol use: Yes     Alcohol/week: 6.0 - 12.0 standard drinks of alcohol     Types: 6 - 12 Standard drinks or equivalent per week     Comment: 1-2x a week    Drug use: Yes     Types: Marijuana     Comment: last smoked 7/21/23        Review of Systems   Constitutional:  Positive for fatigue. Negative for chills, diaphoresis and fever.    HENT:  Negative for congestion, ear pain, nosebleeds and sore throat.    Eyes:  Negative for photophobia, pain, redness and visual disturbance.   Respiratory:  Negative for cough, chest tightness and shortness of breath.    Cardiovascular:  Negative for chest pain and palpitations.   Gastrointestinal:  Negative for abdominal pain, constipation, diarrhea, nausea and vomiting.   Genitourinary:  Positive for vaginal bleeding. Negative for difficulty urinating, dysuria, flank pain, frequency, hematuria, pelvic pain, urgency, vaginal discharge and vaginal pain.   Musculoskeletal:  Negative for arthralgias, back pain, neck pain and neck stiffness.   Skin:  Negative for color change and rash.   Neurological:  Positive for dizziness and light-headedness. Negative for seizures, syncope, numbness and headaches.   All other systems reviewed and are negative.      Physical Exam  ED Triage Vitals   Temperature Pulse Respirations Blood Pressure SpO2   03/05/24 1255 03/05/24 1249 03/05/24 1249 03/05/24 1249 03/05/24 1249   98.8 °F (37.1 °C) 67 18 115/53 100 %      Temp Source Heart Rate Source Patient Position - Orthostatic VS BP Location FiO2 (%)   03/05/24 1255 -- -- -- --   Oral          Pain Score       --                    Orthostatic Vital Signs  Vitals:    03/05/24 1249 03/05/24 1416 03/05/24 1417   BP: 115/53  (!) 104/49   Pulse: 67 59 57       Physical Exam  Vitals and nursing note reviewed.   Constitutional:       Appearance: She is well-developed. She is not diaphoretic.   HENT:      Head: Normocephalic and atraumatic.      Right Ear: External ear normal.      Left Ear: External ear normal.      Nose: Nose normal. No congestion.      Mouth/Throat:      Mouth: Mucous membranes are moist.      Pharynx: Oropharynx is clear. No oropharyngeal exudate or posterior oropharyngeal erythema.   Eyes:      Extraocular Movements: Extraocular movements intact.      Conjunctiva/sclera: Conjunctivae normal.      Pupils: Pupils are  equal, round, and reactive to light.   Cardiovascular:      Rate and Rhythm: Normal rate and regular rhythm.      Pulses: Normal pulses.      Heart sounds: Normal heart sounds. No murmur heard.  Pulmonary:      Effort: Pulmonary effort is normal. No respiratory distress.      Breath sounds: Normal breath sounds. No stridor. No wheezing, rhonchi or rales.   Abdominal:      General: Bowel sounds are normal.      Palpations: Abdomen is soft.      Tenderness: There is no abdominal tenderness. There is no right CVA tenderness, left CVA tenderness, guarding or rebound.   Musculoskeletal:         General: No swelling, tenderness or deformity.      Cervical back: Normal range of motion and neck supple. No rigidity or tenderness.      Right lower leg: No edema.      Left lower leg: No edema.   Lymphadenopathy:      Cervical: No cervical adenopathy.   Skin:     General: Skin is warm and dry.      Capillary Refill: Capillary refill takes less than 2 seconds.      Coloration: Skin is not jaundiced or pale.      Findings: No bruising, erythema, lesion or rash.   Neurological:      General: No focal deficit present.      Mental Status: She is alert and oriented to person, place, and time. Mental status is at baseline.      GCS: GCS eye subscore is 4. GCS verbal subscore is 5. GCS motor subscore is 6.      Cranial Nerves: Cranial nerves 2-12 are intact. No cranial nerve deficit, dysarthria or facial asymmetry.      Sensory: Sensation is intact. No sensory deficit.      Motor: Motor function is intact. No weakness.      Coordination: Coordination is intact. Coordination normal.      Gait: Gait is intact.   Psychiatric:         Mood and Affect: Mood normal.         Behavior: Behavior normal.         Thought Content: Thought content normal.         ED Medications  Medications - No data to display    Diagnostic Studies  Results Reviewed       Procedure Component Value Units Date/Time    POCT pregnancy, urine [[id number]]  (Normal)  Resulted: 03/05/24 1426    Lab Status: Final result Updated: 03/05/24 1426     EXT Preg Test, Ur Negative     Control Valid    APTT [[id number]]  (Normal) Collected: 03/05/24 1337    Lab Status: Final result Specimen: Blood from Arm, Left Updated: 03/05/24 1419     PTT 30 seconds     Protime-INR [[id number]]  (Normal) Collected: 03/05/24 1337    Lab Status: Final result Specimen: Blood from Arm, Left Updated: 03/05/24 1419     Protime 14.3 seconds      INR 1.05    CBC and differential [[id number]]  (Abnormal) Collected: 03/05/24 1337    Lab Status: Final result Specimen: Blood from Arm, Left Updated: 03/05/24 1410     WBC 3.28 Thousand/uL      RBC 4.05 Million/uL      Hemoglobin 7.1 g/dL      Hematocrit 27.3 %      MCV 67 fL      MCH 17.5 pg      MCHC 26.0 g/dL      RDW 19.8 %      Platelets 183 Thousands/uL      nRBC 0 /100 WBCs      Neutrophils Relative 51 %      Immat GRANS % 0 %      Lymphocytes Relative 30 %      Monocytes Relative 13 %      Eosinophils Relative 4 %      Basophils Relative 2 %      Neutrophils Absolute 1.67 Thousands/µL      Immature Grans Absolute 0.01 Thousand/uL      Lymphocytes Absolute 0.98 Thousands/µL      Monocytes Absolute 0.44 Thousand/µL      Eosinophils Absolute 0.12 Thousand/µL      Basophils Absolute 0.06 Thousands/µL     Comprehensive metabolic panel [[id number]] Collected: 03/05/24 1337    Lab Status: Final result Specimen: Blood from Arm, Left Updated: 03/05/24 1403     Sodium 135 mmol/L      Potassium 3.9 mmol/L      Chloride 106 mmol/L      CO2 24 mmol/L      ANION GAP 5 mmol/L      BUN 10 mg/dL      Creatinine 0.80 mg/dL      Glucose 86 mg/dL      Calcium 9.1 mg/dL      AST 14 U/L      ALT 10 U/L      Alkaline Phosphatase 50 U/L      Total Protein 7.2 g/dL      Albumin 4.4 g/dL      Total Bilirubin 0.39 mg/dL      eGFR 99 ml/min/1.73sq m     Narrative:      [employer] guidelines for Chronic Kidney Disease (CKD):     Stage 1 with normal or high GFR (GFR >  90 mL/min/1.73 square meters)    Stage 2 Mild CKD (GFR = 60-89 mL/min/1.73 square meters)    Stage 3A Moderate CKD (GFR = 45-59 mL/min/1.73 square meters)    Stage 3B Moderate CKD (GFR = 30-44 mL/min/1.73 square meters)    Stage 4 Severe CKD (GFR = 15-29 mL/min/1.73 square meters)    Stage 5 End Stage CKD (GFR <15 mL/min/1.73 square meters)  Note: GFR calculation is accurate only with a steady state creatinine                   No orders to display         Procedures  ECG 12 Lead Documentation Only    Date/Time: 3/5/2024 1:14 PM    Performed by: Edin Wyatt DO  Authorized by: Edin Wyatt DO    ECG reviewed by me, the ED Provider: yes    Patient location:  ED  Previous ECG:     Previous ECG:  Compared to current    Comparison ECG info:  October 6, 2022.    Similarity:  Changes noted  Interpretation:     Interpretation: abnormal    Rate:     ECG rate:  50    ECG rate assessment: bradycardic    Rhythm:     Rhythm: sinus bradycardia    Ectopy:     Ectopy: none    QRS:     QRS axis:  Normal  Conduction:     Conduction: normal    ST segments:     ST segments:  Normal  T waves:     T waves: normal          ED Course  ED Course as of 03/05/24 1609   Tue Mar 05, 2024   1249 2/28/2024.  Patient with hemoglobin of 7.,  Hematocrit 26.1, MCV 66.  With iron less than 10.    1310 ECG 12 lead  Sinus bradycardia 50.  Normal axis.  No acute ischemic changes.  Compared to prior EKG done on October 6, 2022, sinus bradycardia is replaced normal sinus rhythm   1409 Comprehensive metabolic panel  No acute electrolyte abnormality.  Renal function at baseline.  LFTs WNL.   1411 Hemoglobin(!): 7.1   1411 Hematocrit(!): 27.3   1411 MCV(!): 67   1420 PTT: 30  WNL   1420 Protime-INR  WNL   1426 Pt otherwise hemodynamically stable. Symptoms at this time very mild in nature. Hgb increased from 7.0 to 7.1.  Will hold off on blood transfusion at this time, and treat with p.o. iron supplementation close outpatient follow-up.  Patient reports she  "has outpatient follow-up scheduled with her GI next week, and she was advised to follow-up with her PCP   3606 PREGNANCY TEST URINE: Negative                                       Medical Decision Making  Patient with history as above presented to triage with CC of \" Patient presents with:  Abnormal Lab: Sent by pcp for low iron, pt states feels light headed for the last month   \"    Hx obtained from pt    28-year-old female with history of chronic anemia presenting to the ED with complaints of generalized weakness ongoing for the past few days associated with vaginal bleeding during her menstrual cycle.  Patient with longstanding history of chronic anemia, with history of blood transfusion and iron infusions.  Had outpatient lab work showing iron deficiency anemia, with hemoglobin of 7 on 2/28.  She reports today as she cannot make it earlier due to work.  Will repeat lab work, and consider transfusion if hemoglobin less than 7 or symptomatic anemia.  Physical exam otherwise unremarkable, patient ambulatory without increase in heart rate and otherwise hemodynamically stable.  Differential diagnosis includes but is not limited to symptomatic anemia, electrolyte abnormality, arrhythmia; doubt acute infectious process    CBC showing microcytic anemia, with hemoglobin of 7.1 increased from 7.0 on 2/28.  Shared decision making was made to hold off on blood transfusion at this time after discussion of risk versus benefits.  Will treat with p.o. iron supplementation as outpatient, with close outpatient follow-up with her PMD, OB/GYN and hematologist.  Patient otherwise hemodynamically stable at time of discharge.  Reviewed strict return precautions with patient and she is agreeable with discharge plan.    Patient was nontoxic appearing and stable. Exam as above. Ambulatory and Tolerating PO.     I have independently ordered, reviewed and interpreted the following: labs and/or imaging studies listed above, EKG " above  Reviewed external records including notes, and prior labs/imaging results.    DDx including but not limited to: anemia, iron deficiency, GI bleed, lab error, other active bleeding, occult cancer, other malignancy, adverse reaction.  Overall presentation consistent with iron deficiency anemia.    Consideration was given for admission, but the patient was stable for outpatient management.    Disposition: Discussed need for follow up with their primary doctor or specialist to review all results, including incidental findings as above. Patient discharged with explanation of ED workup and diagnosis, instructions on how to obtain outpatient follow up, care instructions at home, and strict return precautions if patient develops new or worsening symptoms. Patients questions answered and agreeable with discharge plan.     See ED Course for further MDM.      PLEASE NOTE:  This encounter was completed utilizing the [employer] Direct Speech Voice Recognition Software. Grammatical errors, random word insertions, pronoun errors and incomplete sentences are occasional inherent consequences of the system due to software limitations, ambient noise and hardware issues.These may be missed by proof reading prior to affixing electronic signature. Any questions or concerns about the content, text or information contained within the body of this dictation should be directly addressed to the physician for clarification. Please do not hesitate to call me directly if you have any questions or concerns.      Amount and/or Complexity of Data Reviewed  External Data Reviewed: labs, ECG and notes.  Labs: ordered. Decision-making details documented in ED Course.  ECG/medicine tests: independent interpretation performed. Decision-making details documented in ED Course.    Risk  OTC drugs.          Disposition  Final diagnoses:   Symptomatic anemia   Iron deficiency anemia due to chronic blood loss     Time reflects when diagnosis was  documented in both MDM as applicable and the Disposition within this note       Time User Action Codes Description Comment    3/5/2024  2:17 PM Edin Wyatt [D64.9] Symptomatic anemia     3/5/2024  2:19 PM Edin Wyatt [D50.0] Iron deficiency anemia due to chronic blood loss           ED Disposition       ED Disposition   Discharge    Condition   Stable    Date/Time   Tue Mar 5, 2024  2:26 PM    Comment   Alisson Wolf discharge to home/self care.                   Follow-up Information       Follow up With Specialties Details Why Contact Info    Jose Ochoa MD Family Medicine  Please call tomorrow to schedule an appointment [address] [place] [address]  [phone]              Discharge Medication List as of 3/5/2024  2:26 PM        START taking these medications    Details   ferrous sulfate 325 (65 Fe) mg tablet Take 1 tablet (325 mg total) by mouth daily, Starting Tue 3/5/2024, Normal           CONTINUE these medications which have NOT CHANGED    Details   acetaminophen (TYLENOL) 500 mg tablet Take 500 mg by mouth every 6 (six) hours as needed for mild pain, Historical Med      escitalopram (LEXAPRO) 5 mg tablet TAKE 1 TABLET (5 MG TOTAL) BY MOUTH DAILY., Starting Mon 11/20/2023, Normal      Levonorgestrel (Mirena, 52 MG,) 20 MCG/DAY IUD 1 INTRA UTERINE DEVICE BY INTRAUTERINE ROUTE ONCE FOR 1 DOSE, Normal      Prenatal Vit-Fe Fumarate-FA (PRENATAL 1+1 PO) Take by mouth, Historical Med      Tranexamic Acid 650 MG TABS Take 650 mg by mouth 3 (three) times a day, Starting Wed 1/10/2024, Historical Med           No discharge procedures on file.    PDMP Review       None             ED Provider  Attending physically available and evaluated Alisson Wolf. I managed the patient along with the ED Attending.    Electronically Signed by           Edin Wyatt DO  03/05/24 3698

## 2024-03-05 NOTE — TELEPHONE ENCOUNTER
Appointment Schedule   Who are you speaking with? Patient   If it is not the patient, are they listed on an active communication consent form? N/A   Which provider is the appointment scheduled with? Tova Villaseñor PA-C   At which location is the appointment scheduled for? [facility]   When is the appointment scheduled?  Please list date and time 03/15/2024 @9:30AM    What is the reason for this appointment? Requesting fu   Did patient voice understanding of the details of this appointment? Yes   Was the no show policy reviewed with patient? Yes

## 2024-03-05 NOTE — DISCHARGE INSTRUCTIONS
Today you were seen in the emergency department for generalized weakness. Your workup included labs, EKG and urine testing for pregnancy. I believe your symptoms to be the result of anemia, likely due to low iron in the setting of vaginal bleeding from menstrual cycle. At this time there does not appear to be an emergent life threatening cause to explain your symptoms. You are stable for discharge home with outpatient follow up.     Please follow up with your primary care provider in the next 2-3 days along with your OB/GYN. Please review all results discussed today with your doctors.    Please start taking iron supplementation, and you can take this with orange juice or other vitamin C supplements to help with absorption.    Please return to the emergency department as soon as possible if you develop uncontrollable fevers (Temp >100.4), increased bleeding, feeling like you are going to pass out, fall/dizziness/lightheadedness, uncontrollable pain, vomiting, chest pain, trouble breathing, or any other concerning symptoms.     Thank you for choosing [facility] for your care.      no

## 2024-03-06 LAB
ATRIAL RATE: 50 BPM
P AXIS: 46 DEGREES
PR INTERVAL: 144 MS
QRS AXIS: 61 DEGREES
QRSD INTERVAL: 86 MS
QT INTERVAL: 450 MS
QTC INTERVAL: 410 MS
T WAVE AXIS: 38 DEGREES
VENTRICULAR RATE: 50 BPM

## 2024-03-06 PROCEDURE — 93010 ELECTROCARDIOGRAM REPORT: CPT | Performed by: INTERNAL MEDICINE

## 2024-03-15 ENCOUNTER — OFFICE VISIT (OUTPATIENT)
Dept: HEMATOLOGY ONCOLOGY | Facility: CLINIC | Age: 29
End: 2024-03-15
Payer: COMMERCIAL

## 2024-03-15 ENCOUNTER — TELEPHONE (OUTPATIENT)
Dept: HEMATOLOGY ONCOLOGY | Facility: CLINIC | Age: 29
End: 2024-03-15

## 2024-03-15 VITALS
WEIGHT: 146 LBS | HEIGHT: 67 IN | RESPIRATION RATE: 18 BRPM | SYSTOLIC BLOOD PRESSURE: 120 MMHG | TEMPERATURE: 98 F | BODY MASS INDEX: 22.91 KG/M2 | DIASTOLIC BLOOD PRESSURE: 80 MMHG

## 2024-03-15 DIAGNOSIS — D50.0 IRON DEFICIENCY ANEMIA DUE TO CHRONIC BLOOD LOSS: Primary | ICD-10-CM

## 2024-03-15 DIAGNOSIS — D50.9 IRON DEFICIENCY ANEMIA, UNSPECIFIED IRON DEFICIENCY ANEMIA TYPE: Primary | ICD-10-CM

## 2024-03-15 DIAGNOSIS — D50.9 IRON DEFICIENCY ANEMIA, UNSPECIFIED IRON DEFICIENCY ANEMIA TYPE: ICD-10-CM

## 2024-03-15 PROCEDURE — 99214 OFFICE O/P EST MOD 30 MIN: CPT | Performed by: PHYSICIAN ASSISTANT

## 2024-03-15 RX ORDER — SODIUM CHLORIDE 9 MG/ML
20 INJECTION, SOLUTION INTRAVENOUS ONCE
OUTPATIENT
Start: 2024-03-25

## 2024-03-15 RX ORDER — SODIUM CHLORIDE 9 MG/ML
20 INJECTION, SOLUTION INTRAVENOUS ONCE
Status: CANCELLED | OUTPATIENT
Start: 2024-03-20
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
## 2024-03-20 ENCOUNTER — OFFICE VISIT (OUTPATIENT)
Dept: OBGYN CLINIC | Facility: CLINIC | Age: 29
End: 2024-03-20

## 2024-03-20 VITALS
DIASTOLIC BLOOD PRESSURE: 70 MMHG | BODY MASS INDEX: 23.49 KG/M2 | HEART RATE: 94 BPM | SYSTOLIC BLOOD PRESSURE: 121 MMHG | WEIGHT: 150 LBS

## 2024-03-20 DIAGNOSIS — N92.0 MENORRHAGIA WITH REGULAR CYCLE: ICD-10-CM

## 2024-03-20 DIAGNOSIS — Z30.431 IUD CHECK UP: Primary | ICD-10-CM

## 2024-03-20 PROCEDURE — 99213 OFFICE O/P EST LOW 20 MIN: CPT | Performed by: OBSTETRICS & GYNECOLOGY

## 2024-03-20 RX ORDER — TRANEXAMIC ACID 650 MG/1
1300 TABLET ORAL 3 TIMES DAILY
Qty: 30 TABLET | Refills: 6 | Status: SHIPPED | OUTPATIENT
Start: 2024-03-20 | End: 2024-03-25

## 2024-03-20 NOTE — PROGRESS NOTES
Subjective:     Alisson Wolf is a 28 y.o.  female who presents for IUD string check. She has had intermittent spotting since placement but only a few episodes of heavier bleeding. Her IUD was placed in October.     Objective:    Vitals: Blood pressure 121/70, pulse 94, weight 68 kg (150 lb), not currently breastfeeding.Body mass index is 23.49 kg/m².    Physical Exam  Vitals reviewed.   Constitutional:       Appearance: Normal appearance.   Cardiovascular:      Rate and Rhythm: Normal rate.   Pulmonary:      Effort: Pulmonary effort is normal.   Abdominal:      Palpations: Abdomen is soft.   Genitourinary:     Labia:         Right: No rash or tenderness.         Left: No rash or tenderness.       Vagina: No vaginal discharge, erythema or bleeding.      Cervix: No cervical motion tenderness or lesion.      Adnexa: Right adnexa normal and left adnexa normal.      Comments: IUD string present  Musculoskeletal:         General: No swelling or tenderness.   Skin:     General: Skin is warm and dry.   Neurological:      Mental Status: She is alert and oriented to person, place, and time.   Psychiatric:         Mood and Affect: Mood normal.         Assessment/Plan:  TXA prescribed for episodes of heavier bleeding as she has baseline anemia requiring transfusion  RTO as needed        Odessa Lawton MD  3/20/2024  3:12 PM

## 2024-03-25 ENCOUNTER — HOSPITAL ENCOUNTER (OUTPATIENT)
Dept: INFUSION CENTER | Facility: HOSPITAL | Age: 29
Discharge: HOME | End: 2024-03-25
Payer: COMMERCIAL

## 2024-03-25 VITALS
DIASTOLIC BLOOD PRESSURE: 72 MMHG | RESPIRATION RATE: 18 BRPM | SYSTOLIC BLOOD PRESSURE: 120 MMHG | HEART RATE: 63 BPM | TEMPERATURE: 96.9 F

## 2024-03-25 DIAGNOSIS — D50.9 IRON DEFICIENCY ANEMIA, UNSPECIFIED IRON DEFICIENCY ANEMIA TYPE: Primary | ICD-10-CM

## 2024-03-25 PROCEDURE — 96365 THER/PROPH/DIAG IV INF INIT: CPT

## 2024-03-25 RX ORDER — SODIUM CHLORIDE 9 MG/ML
20 INJECTION, SOLUTION INTRAVENOUS ONCE
OUTPATIENT
Start: 2024-03-25

## 2024-03-25 RX ORDER — SODIUM CHLORIDE 9 MG/ML
20 INJECTION, SOLUTION INTRAVENOUS ONCE
Status: COMPLETED | OUTPATIENT
Start: 2024-03-25 | End: 2024-03-25

## 2024-03-25 RX ADMIN — SODIUM CHLORIDE 20 ML/HR: 9 INJECTION, SOLUTION INTRAVENOUS at 11:26

## 2024-03-25 RX ADMIN — IRON SUCROSE 300 MG: 20 INJECTION, SOLUTION INTRAVENOUS at 11:30

## 2024-03-25 NOTE — PROGRESS NOTES
Pt called infusion center after d/c stating that her legs were really itchy. Instructed pt to take 2 benadryl tablets when she got home. Sent messsage to Sahra Luis RN informing her of patient

## 2024-03-25 NOTE — PROGRESS NOTES
Pt received venofer infusion w/out any adverse effects, offers no complaints. Confirmed next appt, declined AVS

## 2024-03-26 RX ORDER — DIPHENHYDRAMINE HCL 25 MG
25 TABLET ORAL ONCE
Start: 2024-04-01 | End: 2024-03-27

## 2024-04-08 ENCOUNTER — HOSPITAL ENCOUNTER (OUTPATIENT)
Dept: INFUSION CENTER | Facility: HOSPITAL | Age: 29
Discharge: HOME | End: 2024-04-08
Payer: COMMERCIAL

## 2024-04-08 VITALS — DIASTOLIC BLOOD PRESSURE: 48 MMHG | HEART RATE: 64 BPM | TEMPERATURE: 97.9 F | SYSTOLIC BLOOD PRESSURE: 96 MMHG

## 2024-04-08 DIAGNOSIS — D50.9 IRON DEFICIENCY ANEMIA, UNSPECIFIED IRON DEFICIENCY ANEMIA TYPE: Primary | ICD-10-CM

## 2024-04-08 PROCEDURE — 96365 THER/PROPH/DIAG IV INF INIT: CPT

## 2024-04-08 RX ORDER — DIPHENHYDRAMINE HCL 25 MG
25 TABLET ORAL ONCE
Status: COMPLETED | OUTPATIENT
Start: 2024-04-08 | End: 2024-04-08

## 2024-04-08 RX ORDER — SODIUM CHLORIDE 9 MG/ML
20 INJECTION, SOLUTION INTRAVENOUS ONCE
Status: CANCELLED | OUTPATIENT
Start: 2024-04-10

## 2024-04-08 RX ORDER — DIPHENHYDRAMINE HCL 25 MG
25 TABLET ORAL ONCE
Status: CANCELLED
Start: 2024-04-15 | End: 2024-04-10

## 2024-04-08 RX ORDER — SODIUM CHLORIDE 9 MG/ML
20 INJECTION, SOLUTION INTRAVENOUS ONCE
Status: COMPLETED | OUTPATIENT
Start: 2024-04-08 | End: 2024-04-08

## 2024-04-08 RX ADMIN — SODIUM CHLORIDE 20 ML/HR: 0.9 INJECTION, SOLUTION INTRAVENOUS at 14:02

## 2024-04-08 RX ADMIN — IRON SUCROSE 300 MG: 20 INJECTION, SOLUTION INTRAVENOUS at 14:02

## 2024-04-08 RX ADMIN — DIPHENHYDRAMINE HYDROCHLORIDE 25 MG: 25 TABLET ORAL at 14:03

## 2024-04-08 NOTE — PROGRESS NOTES
Pt tolerated Venoferinfusion today with no adverse reactions.Declined AVS next apt 4/15/24 @ 1200. Left unit ambulatory with a steady gait.

## 2024-04-15 ENCOUNTER — HOSPITAL ENCOUNTER (OUTPATIENT)
Dept: INFUSION CENTER | Facility: HOSPITAL | Age: 29
Discharge: HOME | End: 2024-04-15
Attending: INTERNAL MEDICINE
Payer: COMMERCIAL

## 2024-04-15 VITALS — TEMPERATURE: 97.6 F | SYSTOLIC BLOOD PRESSURE: 114 MMHG | DIASTOLIC BLOOD PRESSURE: 72 MMHG

## 2024-04-15 DIAGNOSIS — D50.9 IRON DEFICIENCY ANEMIA, UNSPECIFIED IRON DEFICIENCY ANEMIA TYPE: Primary | ICD-10-CM

## 2024-04-15 PROCEDURE — 96365 THER/PROPH/DIAG IV INF INIT: CPT

## 2024-04-15 RX ORDER — DIPHENHYDRAMINE HCL 25 MG
25 TABLET ORAL ONCE
Status: COMPLETED | OUTPATIENT
Start: 2024-04-15 | End: 2024-04-15

## 2024-04-15 RX ORDER — SODIUM CHLORIDE 9 MG/ML
20 INJECTION, SOLUTION INTRAVENOUS ONCE
Status: COMPLETED | OUTPATIENT
Start: 2024-04-15 | End: 2024-04-15

## 2024-04-15 RX ORDER — SODIUM CHLORIDE 9 MG/ML
20 INJECTION, SOLUTION INTRAVENOUS ONCE
Status: CANCELLED | OUTPATIENT
Start: 2024-04-17

## 2024-04-15 RX ORDER — DIPHENHYDRAMINE HCL 25 MG
25 TABLET ORAL ONCE
Status: CANCELLED
Start: 2024-04-22 | End: 2024-04-17

## 2024-04-15 RX ADMIN — SODIUM CHLORIDE 20 ML/HR: 9 INJECTION, SOLUTION INTRAVENOUS at 12:21

## 2024-04-15 RX ADMIN — DIPHENHYDRAMINE HYDROCHLORIDE 25 MG: 25 TABLET ORAL at 12:21

## 2024-04-15 RX ADMIN — IRON SUCROSE 300 MG: 20 INJECTION, SOLUTION INTRAVENOUS at 12:21

## 2024-04-15 NOTE — PROGRESS NOTES
Patient tolerated IV venofer without issue. Next appointment confirmed April 22nd at 12:30pm-[facility] infusion, AVS declined.

## 2024-04-22 ENCOUNTER — HOSPITAL ENCOUNTER (OUTPATIENT)
Dept: INFUSION CENTER | Facility: HOSPITAL | Age: 29
Discharge: HOME | End: 2024-04-22
Attending: INTERNAL MEDICINE
Payer: COMMERCIAL

## 2024-04-22 ENCOUNTER — TELEPHONE (OUTPATIENT)
Dept: INFUSION CENTER | Facility: HOSPITAL | Age: 29
End: 2024-04-22

## 2024-04-22 VITALS
TEMPERATURE: 97 F | HEART RATE: 76 BPM | SYSTOLIC BLOOD PRESSURE: 102 MMHG | RESPIRATION RATE: 16 BRPM | DIASTOLIC BLOOD PRESSURE: 60 MMHG

## 2024-04-22 DIAGNOSIS — D50.9 IRON DEFICIENCY ANEMIA, UNSPECIFIED IRON DEFICIENCY ANEMIA TYPE: Primary | ICD-10-CM

## 2024-04-22 PROCEDURE — 96365 THER/PROPH/DIAG IV INF INIT: CPT

## 2024-04-22 PROCEDURE — 96366 THER/PROPH/DIAG IV INF ADDON: CPT

## 2024-04-22 RX ORDER — SODIUM CHLORIDE 9 MG/ML
20 INJECTION, SOLUTION INTRAVENOUS ONCE
OUTPATIENT
Start: 2024-04-24

## 2024-04-22 RX ORDER — DIPHENHYDRAMINE HCL 25 MG
25 TABLET ORAL ONCE
Status: COMPLETED | OUTPATIENT
Start: 2024-04-22 | End: 2024-04-22

## 2024-04-22 RX ORDER — SODIUM CHLORIDE 9 MG/ML
20 INJECTION, SOLUTION INTRAVENOUS ONCE
Status: COMPLETED | OUTPATIENT
Start: 2024-04-22 | End: 2024-04-22

## 2024-04-22 RX ORDER — DIPHENHYDRAMINE HCL 25 MG
25 TABLET ORAL ONCE
Status: CANCELLED
Start: 2024-04-24 | End: 2024-04-24

## 2024-04-22 RX ADMIN — SODIUM CHLORIDE 20 ML/HR: 0.9 INJECTION, SOLUTION INTRAVENOUS at 13:17

## 2024-04-22 RX ADMIN — IRON SUCROSE 300 MG: 20 INJECTION, SOLUTION INTRAVENOUS at 13:18

## 2024-04-22 RX ADMIN — DIPHENHYDRAMINE HCL 25 MG: 25 TABLET ORAL at 13:16

## 2024-04-22 NOTE — PROGRESS NOTES
Alisson Wolf  tolerated treatment well with no complications.      Alisson Wolf is aware of future appt on 4/29/24 at 1:30 pm.     AVS printed and given to Alisson Wolf:  No (Declined by Alisson Wolf)

## 2024-06-24 NOTE — ANESTHESIA PREPROCEDURE EVALUATION
Procedure:  (D&C) W/ HYSTEROSCOPY (Uterus)  (EUA) (Pelvis)    Relevant Problems   ANESTHESIA (within normal limits)      HEMATOLOGY   (+) Anemia   (+) Iron deficiency anemia      NEURO/PSYCH   (+) Anxiety and depression      PULMONARY   (+) Cigarette smoker        Physical Exam    Airway    Mallampati score: II  TM Distance: >3 FB  Neck ROM: full     Dental       Cardiovascular  Rhythm: regular, Rate: normal,     Pulmonary  Breath sounds clear to auscultation,     Other Findings        Anesthesia Plan  ASA Score- 2     Anesthesia Type- general with ASA Monitors. Additional Monitors:   Airway Plan:           Plan Factors-Exercise tolerance (METS): >4 METS. Chart reviewed. Existing labs reviewed. Patient summary reviewed. Induction- intravenous. Postoperative Plan-     Informed Consent- Anesthetic plan and risks discussed with patient. Subjective   Patient ID: Brie is a 16 year old female who is accompanied by:father     Chief Complaint   Patient presents with   • Office Visit   • Sore Throat     Patient stats she has been in pain for the past 3 days was seen in the urgent care on 6/21/2024 was tested for strep results were neg. Patient stats that she has white spots on her tonsils.    • Ear Problem     Patient stats that is having bilateral ear pain for a few days now and is having moments that they feel full and is hard of hearing.         HPI  Brie is a 16-year-old, here with father for follow-up visit.  Patient's symptoms began about 1 week ago with sore throat, enlarged tonsils and some patchy exudate on the tonsils.  She is having some difficulty eating and drinking.  She notes some fullness of her ears bilaterally, but no significant pain.  No significant congestion, no fevers, no vomiting or diarrhea, no body aches.  She does have some fatigue.  She was seen at walk-in care 3 days ago, negative strep testing at that time, negative rapid mono, but she does have atypical lymphocytes on her CBC.  She was started on prednisone, currently 3 days through her 5-day course without much improvement.  She does have normal output.  No other concerns.    Review of Systems   All other systems reviewed and are negative.      Patient's medications, allergies, past medical, surgical, social and family histories were reviewed and updated as appropriate.    Objective   Vitals:Pulse 77   Temp 97.3 °F (36.3 °C) (Temporal)   Resp 18   Wt 54.6 kg (120 lb 6.4 oz)   LMP 06/23/2024   Physical Exam  Vitals and nursing note reviewed.   Constitutional:       General: She is not in acute distress.     Appearance: Normal appearance. She is not ill-appearing or toxic-appearing.   HENT:      Head: Normocephalic and atraumatic.      Right Ear: Tympanic membrane, ear canal and external ear normal.      Left Ear: Tympanic membrane, ear canal and external ear normal.       Nose: No congestion or rhinorrhea.      Mouth/Throat:      Mouth: Mucous membranes are moist.      Pharynx: Oropharyngeal exudate and posterior oropharyngeal erythema present.      Comments: Mild/moderate tonsillar enlargement with some erythema, patchy whitish exudates on bilateral tonsils     Neck: Normal range of motion. Tenderness present. No rigidity.   Eyes:      Conjunctiva/sclera: Conjunctivae normal.   Cardiovascular:      Rate and Rhythm: Normal rate and regular rhythm.      Pulses: Normal pulses.      Heart sounds: Normal heart sounds.   Pulmonary:      Effort: Pulmonary effort is normal. No respiratory distress.      Breath sounds: Normal breath sounds. No stridor. No wheezing, rhonchi or rales.   Chest:      Chest wall: No tenderness.   Abdominal:      General: Abdomen is flat. There is no distension.      Palpations: Abdomen is soft.      Tenderness: There is no abdominal tenderness.   Lymphadenopathy:      Cervical: Cervical adenopathy (Multiple 1-1.5 cm lymph nodes, more on the left side, tender) present.   Skin:     General: Skin is warm and dry.      Capillary Refill: Capillary refill takes less than 2 seconds.      Findings: No rash.   Neurological:      Mental Status: She is alert. Mental status is at baseline.      Motor: No weakness.      Gait: Gait normal.         Assessment   Problem List Items Addressed This Visit    None  Visit Diagnoses     Viral pharyngitis    -  Primary        - Patient is a 16-year-old here with 1 week of ongoing sore throat  - Exam per above, patchy exudates on tonsils bilaterally  - At this point, I strongly suspect she has viral pharyngitis, potentially EBV versus other etiology.  Extensive discussion with family about treatment options.  Given the abnormalities of her blood counts, I think she should have repeat labs, inflammatory markers, smear, potential flow cytometry to rule out any more ominous etiology, along with EBV and CMV titers.  Family would like to  do this at the end of the week if not better.  Can consider imaging at that point.  If she is having more lymphadenopathy may need to consider adding antibiotics.  - Can always have patient evaluated by ENT or in the ED if no improvement  - All this was discussed with family.  Family noted understanding and agreement with plan.    Instructed to call if problem worsens or does not improve within the next 72 hours otherwise follow-up prn

## 2024-07-17 ENCOUNTER — TELEPHONE (OUTPATIENT)
Dept: HEMATOLOGY ONCOLOGY | Facility: CLINIC | Age: 29
End: 2024-07-17

## 2024-08-30 NOTE — PROGRESS NOTES
Patient went for 4 of her 8 scheduled Venofer treatments April 2024.  Plan discontinued due to being stale.

## 2024-09-11 ENCOUNTER — TELEPHONE (OUTPATIENT)
Dept: OBGYN CLINIC | Facility: CLINIC | Age: 29
End: 2024-09-11

## 2024-12-16 ENCOUNTER — HOSPITAL ENCOUNTER (EMERGENCY)
Facility: HOSPITAL | Age: 29
Discharge: HOME | End: 2024-12-16
Attending: EMERGENCY MEDICINE

## 2024-12-16 VITALS
WEIGHT: 150 LBS | BODY MASS INDEX: 23.54 KG/M2 | RESPIRATION RATE: 20 BRPM | DIASTOLIC BLOOD PRESSURE: 60 MMHG | HEIGHT: 67 IN | HEART RATE: 82 BPM | TEMPERATURE: 97.5 F | OXYGEN SATURATION: 100 % | SYSTOLIC BLOOD PRESSURE: 125 MMHG

## 2024-12-16 DIAGNOSIS — N73.0 PID (ACUTE PELVIC INFLAMMATORY DISEASE): Primary | ICD-10-CM

## 2024-12-16 LAB
BACTERIA UR QL AUTO: ABNORMAL /HPF
BILIRUB UR QL STRIP: NEGATIVE
CLARITY UR: CLEAR
COLOR UR: ABNORMAL
EXT PREGNANCY TEST URINE: NEGATIVE
EXT. CONTROL: NORMAL
GLUCOSE UR STRIP-MCNC: NEGATIVE MG/DL
HGB UR QL STRIP.AUTO: NEGATIVE
KETONES UR STRIP-MCNC: ABNORMAL MG/DL
LEUKOCYTE ESTERASE UR QL STRIP: NEGATIVE
MUCOUS THREADS UR QL AUTO: ABNORMAL
NITRITE UR QL STRIP: NEGATIVE
NON-SQ EPI CELLS URNS QL MICRO: ABNORMAL /HPF
PH UR STRIP.AUTO: 5.5 [PH]
PROT UR STRIP-MCNC: ABNORMAL MG/DL
RBC #/AREA URNS AUTO: ABNORMAL /HPF
SP GR UR STRIP.AUTO: 1.03 (ref 1–1.03)
UROBILINOGEN UR STRIP-ACNC: <2 MG/DL
WBC #/AREA URNS AUTO: ABNORMAL /HPF

## 2024-12-16 PROCEDURE — 93005 ELECTROCARDIOGRAM TRACING: CPT

## 2024-12-16 PROCEDURE — 99284 EMERGENCY DEPT VISIT MOD MDM: CPT | Performed by: EMERGENCY MEDICINE

## 2024-12-16 PROCEDURE — 96372 THER/PROPH/DIAG INJ SC/IM: CPT

## 2024-12-16 PROCEDURE — 81001 URINALYSIS AUTO W/SCOPE: CPT

## 2024-12-16 PROCEDURE — 87491 CHLMYD TRACH DNA AMP PROBE: CPT

## 2024-12-16 PROCEDURE — 99283 EMERGENCY DEPT VISIT LOW MDM: CPT

## 2024-12-16 PROCEDURE — 81025 URINE PREGNANCY TEST: CPT

## 2024-12-16 PROCEDURE — 87591 N.GONORRHOEAE DNA AMP PROB: CPT

## 2024-12-16 RX ORDER — METRONIDAZOLE 500 MG/1
500 TABLET ORAL EVERY 8 HOURS SCHEDULED
Qty: 42 TABLET | Refills: 0 | Status: SHIPPED | OUTPATIENT
Start: 2024-12-16 | End: 2024-12-30

## 2024-12-16 RX ORDER — METRONIDAZOLE 500 MG/1
500 TABLET ORAL ONCE
Status: COMPLETED | OUTPATIENT
Start: 2024-12-16 | End: 2024-12-16

## 2024-12-16 RX ORDER — DOXYCYCLINE 100 MG/1
100 CAPSULE ORAL 2 TIMES DAILY
Qty: 20 CAPSULE | Refills: 0 | Status: SHIPPED | OUTPATIENT
Start: 2024-12-16 | End: 2024-12-30

## 2024-12-16 RX ORDER — DOXYCYCLINE 100 MG/1
100 CAPSULE ORAL ONCE
Status: COMPLETED | OUTPATIENT
Start: 2024-12-16 | End: 2024-12-16

## 2024-12-16 RX ADMIN — DOXYCYCLINE HYCLATE 100 MG: 100 CAPSULE ORAL at 23:07

## 2024-12-16 RX ADMIN — METRONIDAZOLE 500 MG: 500 TABLET ORAL at 23:02

## 2024-12-16 RX ADMIN — CEFTRIAXONE 1000 MG: 1 INJECTION, POWDER, FOR SOLUTION INTRAMUSCULAR; INTRAVENOUS at 23:02

## 2024-12-17 ENCOUNTER — OFFICE VISIT (OUTPATIENT)
Dept: OBGYN CLINIC | Facility: CLINIC | Age: 29
End: 2024-12-17

## 2024-12-17 VITALS
BODY MASS INDEX: 23.51 KG/M2 | HEIGHT: 67 IN | DIASTOLIC BLOOD PRESSURE: 82 MMHG | HEART RATE: 93 BPM | SYSTOLIC BLOOD PRESSURE: 132 MMHG | WEIGHT: 149.8 LBS

## 2024-12-17 DIAGNOSIS — B96.89 BACTERIAL VAGINOSIS: Primary | ICD-10-CM

## 2024-12-17 DIAGNOSIS — N76.0 BACTERIAL VAGINOSIS: Primary | ICD-10-CM

## 2024-12-17 LAB
ATRIAL RATE: 69 BPM
BV WHIFF TEST VAG QL: POSITIVE
C TRACH DNA SPEC QL NAA+PROBE: NEGATIVE
CLUE CELLS SPEC QL WET PREP: POSITIVE
N GONORRHOEA DNA SPEC QL NAA+PROBE: NEGATIVE
P AXIS: 105 DEGREES
PH SMN: 5.5 [PH]
PR INTERVAL: 146 MS
QRS AXIS: 175 DEGREES
QRSD INTERVAL: 94 MS
QT INTERVAL: 422 MS
QTC INTERVAL: 453 MS
SL AMB POCT WET MOUNT: ABNORMAL
T VAGINALIS VAG QL WET PREP: NEGATIVE
T WAVE AXIS: 168 DEGREES
VENTRICULAR RATE: 69 BPM
YEAST VAG QL WET PREP: NEGATIVE

## 2024-12-17 PROCEDURE — 87210 SMEAR WET MOUNT SALINE/INK: CPT | Performed by: NURSE PRACTITIONER

## 2024-12-17 PROCEDURE — 99213 OFFICE O/P EST LOW 20 MIN: CPT | Performed by: NURSE PRACTITIONER

## 2024-12-17 PROCEDURE — 93010 ELECTROCARDIOGRAM REPORT: CPT | Performed by: INTERNAL MEDICINE

## 2024-12-17 NOTE — ED PROVIDER NOTES
Time reflects when diagnosis was documented in both MDM as applicable and the Disposition within this note       Time User Action Codes Description Comment    12/16/2024 10:53 PM Fer Luna Add [N73.0] PID (acute pelvic inflammatory disease)           ED Disposition       ED Disposition   Discharge    Condition   Stable    Date/Time   Mon Dec 16, 2024 10:24 PM    Comment   Alisson MagañaJoe discharge to home/self care.                   Assessment & Plan       Medical Decision Making  Patient presenting with significant purulence throughout vaginal vault, will treat as PID and recommend OB/GYN follow-up for IUD removal.  Return precautions discussed, all questions answered prior to discharge    Amount and/or Complexity of Data Reviewed  Labs: ordered.    Risk  Prescription drug management.             Medications   cefTRIAXone (ROCEPHIN) 1,000 mg in lidocaine (PF) (XYLOCAINE-MPF) 1 % IM only syringe (1,000 mg Intramuscular Given 12/16/24 2302)   metroNIDAZOLE (FLAGYL) tablet 500 mg (500 mg Oral Given 12/16/24 2302)   doxycycline hyclate (VIBRAMYCIN) capsule 100 mg (100 mg Oral Given 12/16/24 2307)       ED Risk Strat Scores                          SBIRT 20yo+      Flowsheet Row Most Recent Value   Initial Alcohol Screen: US AUDIT-C     1. How often do you have a drink containing alcohol? 4 Filed at: 12/16/2024 2119   2. How many drinks containing alcohol do you have on a typical day you are drinking?  0 Filed at: 12/16/2024 2119   3b. FEMALE Any Age, or MALE 65+: How often do you have 4 or more drinks on one occassion? 0 Filed at: 12/16/2024 2119   Audit-C Score 4 Filed at: 12/16/2024 2119   JENNIE: How many times in the past year have you...    Used an illegal drug or used a prescription medication for non-medical reasons? Never Filed at: 12/16/2024 2119                            History of Present Illness       Chief Complaint   Patient presents with    Contraception     Per pt feels like her IUD has been  misplaced. She said when she has sex it has been painful. Also experiencing lightheadedness and cramping recently.        Past Medical History:   Diagnosis Date    Anemia     Facial abrasion 08/22/2018    Herpes     History of transfusion     Nasal fracture 08/22/2018    Syphilis     resolved      Past Surgical History:   Procedure Laterality Date    EXAMINATION UNDER ANESTHESIA N/A 7/24/2023    Procedure: (EUA);  Surgeon: Yardlie Toussaint-Foster, DO;  Location: [facility];  Service: Gynecology    HERNIA REPAIR      HERNIA REPAIR  1999    umbilical hernia repair - resolved    MO HYSTEROSCOPY BX ENDOMETRIUM&/POLYPC W/WO D&C N/A 7/24/2023    Procedure: (D&C) W/ HYSTEROSCOPY;  Surgeon: Yardlie Toussaint-Foster, DO;  Location: [facility];  Service: Gynecology      Family History   Problem Relation Age of Onset    Thyroid disease Mother     Asthma Mother     Coronary artery disease Mother     Hypertension Mother     No Known Problems Brother     No Known Problems Brother     Diabetes Maternal Grandmother     Hypertension Maternal Grandmother     Stroke Maternal Grandfather         syndrome      Social History     Tobacco Use    Smoking status: Some Days     Current packs/day: 0.25     Types: Cigarettes    Smokeless tobacco: Never    Tobacco comments:     Last smoked 7/23/23   Vaping Use    Vaping status: Some Days    Substances: Nicotine, THC, CBD   Substance Use Topics    Alcohol use: Yes     Alcohol/week: 6.0 - 12.0 standard drinks of alcohol     Types: 6 - 12 Standard drinks or equivalent per week     Comment: 1-2x a week    Drug use: Yes     Types: Marijuana     Comment: last smoked 7/21/23      E-Cigarette/Vaping    E-Cigarette Use Current Some Day User     Comments couple times a week       E-Cigarette/Vaping Substances    Nicotine Yes     THC Yes     CBD Yes     Flavoring No     Other No     Unknown No       I have reviewed and agree with the history as documented.     Patient is a 29-year-old female with Mirena  IUD in place presenting for evaluation of pelvic pain.  Patient reports dyspareunia going on for the last couple of weeks, and is also concerned that her IUD may have migrated.  Patient denies vaginal discharge, fevers, vaginal bleeding, or other complaints on review of systems        Review of Systems   All other systems reviewed and are negative.          Objective       ED Triage Vitals [12/16/24 2117]   Temperature Pulse Blood Pressure Respirations SpO2 Patient Position - Orthostatic VS   97.5 °F (36.4 °C) 82 125/60 20 100 % Sitting      Temp Source Heart Rate Source BP Location FiO2 (%) Pain Score    Temporal Monitor Left arm -- 6      Vitals      Date and Time Temp Pulse SpO2 Resp BP Pain Score FACES Pain Rating User   12/16/24 2117 97.5 °F (36.4 °C) 82 100 % 20 125/60 6 -- AM            Physical Exam  Vitals and nursing note reviewed. Exam conducted with a chaperone present.   Constitutional:       General: She is not in acute distress.     Appearance: She is well-developed.   HENT:      Head: Normocephalic and atraumatic.   Eyes:      Conjunctiva/sclera: Conjunctivae normal.   Cardiovascular:      Rate and Rhythm: Normal rate and regular rhythm.      Heart sounds: No murmur heard.  Pulmonary:      Effort: Pulmonary effort is normal. No respiratory distress.      Breath sounds: Normal breath sounds.   Abdominal:      Palpations: Abdomen is soft.      Tenderness: There is no abdominal tenderness.   Genitourinary:     General: Normal vulva.      Comments: Significant purulence throughout vaginal vault  No cervical motion tenderness  No adnexal tenderness bilaterally  IUD strings noted in cervix  Musculoskeletal:         General: No swelling.      Cervical back: Neck supple.   Skin:     General: Skin is warm and dry.      Capillary Refill: Capillary refill takes less than 2 seconds.   Neurological:      Mental Status: She is alert.   Psychiatric:         Mood and Affect: Mood normal.         Results Reviewed        Procedure Component Value Units Date/Time    Urine Microscopic [[id number]]  (Abnormal) Collected: 24    Lab Status: Final result Specimen: Urine, Clean Catch Updated: 24     RBC, UA None Seen /hpf      WBC, UA None Seen /hpf      Epithelial Cells Occasional /hpf      Bacteria, UA None Seen /hpf      MUCUS THREADS Innumerable    UA w Reflex to Microscopic w Reflex to Culture [[id number]]  (Abnormal) Collected: 24    Lab Status: Final result Specimen: Urine, Clean Catch Updated: 24     Color, UA Light Yellow     Clarity, UA Clear     Specific Gravity, UA 1.030     pH, UA 5.5     Leukocytes, UA Negative     Nitrite, UA Negative     Protein, UA Trace mg/dl      Glucose, UA Negative mg/dl      Ketones, UA 10 (1+) mg/dl      Urobilinogen, UA <2.0 mg/dl      Bilirubin, UA Negative     Occult Blood, UA Negative    POCT pregnancy, urine [[id number]]  (Normal) Collected: 24    Lab Status: Final result Updated: 24     EXT Preg Test, Ur Negative     Control Valid    Chlamydia/GC amplified DNA by PCR [[id number]] Collected: 24    Lab Status: In process Specimen: Endocervical Updated: 24            No orders to display       POC Pelvic US    Date/Time: 2024 11:25 PM    Performed by: Fer Luna MD  Authorized by: Fer Luna MD    Patient location:  ED      ED Medication and Procedure Management   Prior to Admission Medications   Prescriptions Last Dose Informant Patient Reported? Taking?   Levonorgestrel (Mirena, 52 MG,) 20 MCG/DAY IUD   No No   Si INTRA UTERINE DEVICE BY INTRAUTERINE ROUTE ONCE FOR 1 DOSE   Prenatal Vit-Fe Fumarate-FA (PRENATAL 1+1 PO)   Yes No   Sig: Take by mouth   Patient not taking: Reported on 10/4/2023   Tranexamic Acid 650 MG TABS   Yes No   Sig: Take 650 mg by mouth 3 (three) times a day   acetaminophen (TYLENOL) 500 mg tablet  Self Yes No   Sig: Take 500 mg by mouth every 6 (six) hours as needed for  mild pain   escitalopram (LEXAPRO) 5 mg tablet   No No   Sig: TAKE 1 TABLET (5 MG TOTAL) BY MOUTH DAILY.   ferrous sulfate 325 (65 Fe) mg tablet   No No   Sig: Take 1 tablet (325 mg total) by mouth daily      Facility-Administered Medications: None     Discharge Medication List as of 12/16/2024 10:54 PM        START taking these medications    Details   doxycycline hyclate (VIBRAMYCIN) 100 mg capsule Take 1 capsule (100 mg total) by mouth 2 (two) times a day for 14 days, Starting Mon 12/16/2024, Until Mon 12/30/2024, Normal      metroNIDAZOLE (FLAGYL) 500 mg tablet Take 1 tablet (500 mg total) by mouth every 8 (eight) hours for 14 days, Starting Mon 12/16/2024, Until Mon 12/30/2024, Normal           CONTINUE these medications which have NOT CHANGED    Details   acetaminophen (TYLENOL) 500 mg tablet Take 500 mg by mouth every 6 (six) hours as needed for mild pain, Historical Med      escitalopram (LEXAPRO) 5 mg tablet TAKE 1 TABLET (5 MG TOTAL) BY MOUTH DAILY., Starting Mon 11/20/2023, Normal      ferrous sulfate 325 (65 Fe) mg tablet Take 1 tablet (325 mg total) by mouth daily, Starting Tue 3/5/2024, Normal      Levonorgestrel (Mirena, 52 MG,) 20 MCG/DAY IUD 1 INTRA UTERINE DEVICE BY INTRAUTERINE ROUTE ONCE FOR 1 DOSE, Normal      Prenatal Vit-Fe Fumarate-FA (PRENATAL 1+1 PO) Take by mouth, Historical Med      Tranexamic Acid 650 MG TABS Take 650 mg by mouth 3 (three) times a day, Starting Wed 1/10/2024, Historical Med           No discharge procedures on file.  ED SEPSIS DOCUMENTATION   Time reflects when diagnosis was documented in both MDM as applicable and the Disposition within this note       Time User Action Codes Description Comment    12/16/2024 10:53 PM Fer Luna Add [N73.0] PID (acute pelvic inflammatory disease)                  Fer Luna MD  12/16/24 3036       Fer Luna MD  12/16/24 3568

## 2024-12-17 NOTE — DISCHARGE INSTRUCTIONS
You were evaluated in the emergency department for pelvic pain.  Your evaluation is concerning for a process called pelvic inflammatory disease.  We are going to treat you with a course of antibiotics.  Please follow-up with your OB/GYN doctor for further management and evaluation.  You will need to have your IUD removed given this infection.

## 2024-12-17 NOTE — PROGRESS NOTES
"Assessment/Plan:     Diagnoses and all orders for this visit:    Bacterial vaginosis  -     POCT wet mount          Subjective:      Patient ID: Alisson Wolf is a 29 y.o. female.    HPI  Pt presents with concerns her IUD needs to be removed  Pt states she went to the ED with abdominal cramping  Pt was seen in the ED yesterday, told there was purulent discharge in the vagina and was treated for PID  GC/Chlamydia 12/16/2024 was negative   Pt received Ceftriaxon and is taking Flagyl and Doxycycline as directed  Pt has 1 current partner  1/13/2022 WNL PAP    Explained wet mount was positive for BV, safe and effective use of Metronidazole advised to continue taking medication as directed yesterday.  Offered to order STD testing for HIV, RPR and Hep B&C. Pt states she is not concerned about STD's  Discussed 3/2024 H&H of 7/1 and 27.3, pt states she has had several IV iron infusions. Does not have VB with with her IUD.   Explained since she has a Hx of anemia and IV iron transfusion an IUD is a good form of birth control for birth control and menstrual regulation  Pt plans to continue medication as directed and see how she fees  when medication is dosing is completed  She plans to keep her IUD at this time  Pt was encouraged to complete follow up lab work as directed in 3/2024    The following portions of the patient's history were reviewed and updated as appropriate: allergies, current medications, past family history, past medical history, past social history, past surgical history and problem list.    Review of Systems    .Pertinent items are note in the HPI      Objective:      /82 (BP Location: Left arm, Patient Position: Sitting, Cuff Size: Standard)   Pulse 93   Ht 5' 7\" (1.702 m)   Wt 67.9 kg (149 lb 12.8 oz)   LMP 10/29/2024 (Exact Date)   BMI 23.46 kg/m²          Physical Exam    Alert and oriented  Denies pain today  WNL respiratory effort, negative cough or SOB  Vulva negative lesions or " erythema  Vagina positive for a small thin gray/white discharge  Cervix negative lseions, positive thin gray discharge  Wet mount was positive for BV

## 2024-12-17 NOTE — ED ATTENDING ATTESTATION
12/16/2024  I, Joe Kilgore MD, saw and evaluated the patient. I have discussed the patient with the resident/non-physician practitioner and agree with the resident's/non-physician practitioner's findings, Plan of Care, and MDM as documented in the resident's/non-physician practitioner's note, except where noted. All available labs and Radiology studies were reviewed.  I was present for key portions of any procedure(s) performed by the resident/non-physician practitioner and I was immediately available to provide assistance.       At this point I agree with the current assessment done in the Emergency Department.  I have conducted an independent evaluation of this patient a history and physical is as follows:      Final Diagnosis:  1. PID (acute pelvic inflammatory disease)      Chief Complaint   Patient presents with    Contraception     Per pt feels like her IUD has been misplaced. She said when she has sex it has been painful. Also experiencing lightheadedness and cramping recently.            A:  -29-year-old female who presents with dyspareunia.      P:  -Check GC/chlamydia to evaluate for STD.  -Urinalysis to evaluate for UTI.  -Urine pregnancy to evaluate for possible pregnancy.  -IUD in place on pelvic exam.  -Treat for PID.      H:    29-year-old female who presents with dyspareunia.  Also believes that her IUD has been misplaced/fell out.  Experiencing pelvic cramping and lightheadedness.      PMH:  Past Medical History:   Diagnosis Date    Anemia     Facial abrasion 08/22/2018    Herpes     History of transfusion     Nasal fracture 08/22/2018    Syphilis     resolved       PSH:  Past Surgical History:   Procedure Laterality Date    EXAMINATION UNDER ANESTHESIA N/A 7/24/2023    Procedure: (EUA);  Surgeon: Yardlie Toussaint-Foster, DO;  Location: [facility];  Service: Gynecology    HERNIA REPAIR      HERNIA REPAIR  1999    umbilical hernia repair - resolved    [place] HYSTEROSCOPY BX ENDOMETRIUM&/POLYPC W/WO  "D&C N/A 7/24/2023    Procedure: (D&C) W/ HYSTEROSCOPY;  Surgeon: Yardlie Toussaint-Foster, DO;  Location: [facility];  Service: Gynecology         PE:   Vitals:    12/16/24 2117   BP: 125/60   BP Location: Left arm   Pulse: 82   Resp: 20   Temp: 97.5 °F (36.4 °C)   TempSrc: Temporal   SpO2: 100%   Weight: 68 kg (150 lb)   Height: 5' 7\" (1.702 m)         Constitutional: Vital signs are normal. She appears well-developed. She is cooperative. No distress.   Cardiovascular: Normal rate, regular rhythm.  Pulmonary/Chest: Effort normal.   Abdominal: Soft. Normal appearance.  Mild suprapubic tenderness.  Neurological: She is alert.   Skin: Skin is warm, dry and intact.   Psychiatric: She has a normal mood and affect. Her speech is normal and behavior is normal. Thought content normal.          - 13 point ROS was performed and all are normal unless stated in the history above.   - Nursing note reviewed. Vitals reviewed.   - Orders placed by myself and/or advanced practitioner / resident.    - Previous chart was reviewed  - No language barrier.   - History obtained from patient.   - There are no limitations to the history obtained. Reasons ROS could not be obtained:  N/A         Medications   cefTRIAXone (ROCEPHIN) 1,000 mg in lidocaine (PF) (XYLOCAINE-MPF) 1 % IM only syringe (has no administration in time range)   doxycycline (VIBRAMYCIN) 100 mg in sodium chloride 0.9 % 100 mL IVPB (has no administration in time range)   metroNIDAZOLE (FLAGYL) tablet 500 mg (has no administration in time range)     No orders to display     Orders Placed This Encounter   Procedures    Chlamydia/GC amplified DNA by PCR    UA w Reflex to Microscopic w Reflex to Culture    Urine Microscopic    POCT pregnancy, urine    ECG 12 lead     Labs Reviewed   UA W REFLEX TO MICROSCOPIC WITH REFLEX TO CULTURE - Abnormal       Result Value Ref Range Status    Color, UA Light Yellow   Final    Clarity, UA Clear   Final    Specific Gravity, UA 1.030  1.003 " - 1.030 Final    pH, UA 5.5  4.5, 5.0, 5.5, 6.0, 6.5, 7.0, 7.5, 8.0 Final    Leukocytes, UA Negative  Negative Final    Nitrite, UA Negative  Negative Final    Protein, UA Trace (*) Negative mg/dl Final    Glucose, UA Negative  Negative mg/dl Final    Ketones, UA 10 (1+) (*) Negative mg/dl Final    Urobilinogen, UA <2.0  <2.0 mg/dl mg/dl Final    Bilirubin, UA Negative  Negative Final    Occult Blood, UA Negative  Negative Final   URINE MICROSCOPIC - Abnormal    RBC, UA None Seen  None Seen, 1-2 /hpf Final    WBC, UA None Seen  None Seen, 1-2 /hpf Final    Epithelial Cells Occasional  None Seen, Occasional /hpf Final    Bacteria, UA None Seen  None Seen, Occasional /hpf Final    MUCUS THREADS Innumerable (*) None Seen Final   POCT PREGNANCY, URINE - Normal    EXT Preg Test, Ur Negative   Final    Control Valid   Final   CHLAMYDIA /GC AMPLIFIED DNA     Time reflects when diagnosis was documented in both MDM as applicable and the Disposition within this note       Time User Action Codes Description Comment    12/16/2024 10:53 PM Fer Luna Add [N73.0] PID (acute pelvic inflammatory disease)           ED Disposition       ED Disposition   Discharge    Condition   Stable    Date/Time   Mon Dec 16, 2024 10:24 PM    Comment   Alisson Wolf discharge to home/self care.                   Follow-up Information    None       Patient's Medications   Discharge Prescriptions    DOXYCYCLINE HYCLATE (VIBRAMYCIN) 100 MG CAPSULE    Take 1 capsule (100 mg total) by mouth 2 (two) times a day for 14 days       Start Date: 12/16/2024End Date: 12/30/2024       Order Dose: 100 mg       Quantity: 20 capsule    Refills: 0    METRONIDAZOLE (FLAGYL) 500 MG TABLET    Take 1 tablet (500 mg total) by mouth every 8 (eight) hours for 14 days       Start Date: 12/16/2024End Date: 12/30/2024       Order Dose: 500 mg       Quantity: 42 tablet    Refills: 0     No discharge procedures on file.  Prior to Admission Medications  "  Prescriptions Last Dose Informant Patient Reported? Taking?   Levonorgestrel (Mirena, 52 MG,) 20 MCG/DAY IUD   No No   Si INTRA UTERINE DEVICE BY INTRAUTERINE ROUTE ONCE FOR 1 DOSE   Prenatal Vit-Fe Fumarate-FA (PRENATAL 1+1 PO)   Yes No   Sig: Take by mouth   Patient not taking: Reported on 10/4/2023   Tranexamic Acid 650 MG TABS   Yes No   Sig: Take 650 mg by mouth 3 (three) times a day   acetaminophen (TYLENOL) 500 mg tablet  Self Yes No   Sig: Take 500 mg by mouth every 6 (six) hours as needed for mild pain   escitalopram (LEXAPRO) 5 mg tablet   No No   Sig: TAKE 1 TABLET (5 MG TOTAL) BY MOUTH DAILY.   ferrous sulfate 325 (65 Fe) mg tablet   No No   Sig: Take 1 tablet (325 mg total) by mouth daily      Facility-Administered Medications: None       Portions of the record may have been created with voice recognition software. Occasional wrong word or \"sound a like\" substitutions may have occurred due to the inherent limitations of voice recognition software. Read the chart carefully and recognize, using context, where substitutions have occurred.       ED Course         Critical Care Time  Procedures      "

## 2025-01-27 ENCOUNTER — HOSPITAL ENCOUNTER (EMERGENCY)
Facility: HOSPITAL | Age: 30
Discharge: HOME | End: 2025-01-27
Attending: EMERGENCY MEDICINE | Admitting: EMERGENCY MEDICINE

## 2025-01-27 ENCOUNTER — APPOINTMENT (EMERGENCY)
Dept: RADIOLOGY | Facility: HOSPITAL | Age: 30
End: 2025-01-27

## 2025-01-27 VITALS
OXYGEN SATURATION: 97 % | SYSTOLIC BLOOD PRESSURE: 139 MMHG | HEART RATE: 78 BPM | RESPIRATION RATE: 20 BRPM | TEMPERATURE: 98 F | DIASTOLIC BLOOD PRESSURE: 60 MMHG
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
## 2025-01-27 DIAGNOSIS — R51.9 HEADACHE: ICD-10-CM

## 2025-01-27 LAB
ANION GAP SERPL CALCULATED.3IONS-SCNC: 6 MMOL/L (ref 4–13)
BASOPHILS # BLD AUTO: 0.08 THOUSANDS/ΜL (ref 0–0.1)
BASOPHILS NFR BLD AUTO: 1 % (ref 0–1)
BUN SERPL-MCNC: 13 MG/DL (ref 5–25)
CALCIUM SERPL-MCNC: 9.2 MG/DL (ref 8.4–10.2)
CHLORIDE SERPL-SCNC: 110 MMOL/L (ref 96–108)
CO2 SERPL-SCNC: 24 MMOL/L (ref 21–32)
CREAT SERPL-MCNC: 0.9 MG/DL (ref 0.6–1.3)
EOSINOPHIL # BLD AUTO: 0.05 THOUSAND/ΜL (ref 0–0.61)
EOSINOPHIL NFR BLD AUTO: 1 % (ref 0–6)
ERYTHROCYTE [DISTWIDTH] IN BLOOD BY AUTOMATED COUNT: 22.9 % (ref 11.6–15.1)
EXT PREGNANCY TEST URINE: NEGATIVE
EXT. CONTROL: NORMAL
GFR SERPL CREATININE-BSD FRML MDRD: 86 ML/MIN/1.73SQ M
GLUCOSE SERPL-MCNC: 87 MG/DL (ref 65–140)
HCT VFR BLD AUTO: 31.2 % (ref 34.8–46.1)
HGB BLD-MCNC: 8.4 G/DL (ref 11.5–15.4)
IMM GRANULOCYTES # BLD AUTO: 0.02 THOUSAND/UL (ref 0–0.2)
IMM GRANULOCYTES NFR BLD AUTO: 0 % (ref 0–2)
LYMPHOCYTES # BLD AUTO: 1.48 THOUSANDS/ΜL (ref 0.6–4.47)
LYMPHOCYTES NFR BLD AUTO: 18 % (ref 14–44)
MCH RBC QN AUTO: 19.4 PG (ref 26.8–34.3)
MCHC RBC AUTO-ENTMCNC: 26.9 G/DL (ref 31.4–37.4)
MCV RBC AUTO: 72 FL (ref 82–98)
MONOCYTES # BLD AUTO: 0.82 THOUSAND/ΜL (ref 0.17–1.22)
MONOCYTES NFR BLD AUTO: 10 % (ref 4–12)
NEUTROPHILS # BLD AUTO: 5.73 THOUSANDS/ΜL (ref 1.85–7.62)
NEUTS SEG NFR BLD AUTO: 70 % (ref 43–75)
NRBC BLD AUTO-RTO: 0 /100 WBCS
PLATELET # BLD AUTO: 418 THOUSANDS/UL (ref 149–390)
PMV BLD AUTO: 10.5 FL (ref 8.9–12.7)
POTASSIUM SERPL-SCNC: 3.9 MMOL/L (ref 3.5–5.3)
RBC # BLD AUTO: 4.34 MILLION/UL (ref 3.81–5.12)
SODIUM SERPL-SCNC: 140 MMOL/L (ref 135–147)
WBC # BLD AUTO: 8.18 THOUSAND/UL (ref 4.31–10.16)

## 2025-01-27 PROCEDURE — 96375 TX/PRO/DX INJ NEW DRUG ADDON: CPT

## 2025-01-27 PROCEDURE — 70496 CT ANGIOGRAPHY HEAD: CPT

## 2025-01-27 PROCEDURE — 80048 BASIC METABOLIC PNL TOTAL CA: CPT

## 2025-01-27 PROCEDURE — 99284 EMERGENCY DEPT VISIT MOD MDM: CPT

## 2025-01-27 PROCEDURE — 96365 THER/PROPH/DIAG IV INF INIT: CPT

## 2025-01-27 PROCEDURE — 70498 CT ANGIOGRAPHY NECK: CPT

## 2025-01-27 PROCEDURE — 81025 URINE PREGNANCY TEST: CPT

## 2025-01-27 PROCEDURE — 99285 EMERGENCY DEPT VISIT HI MDM: CPT | Performed by: EMERGENCY MEDICINE

## 2025-01-27 PROCEDURE — 36415 COLL VENOUS BLD VENIPUNCTURE: CPT

## 2025-01-27 PROCEDURE — 96366 THER/PROPH/DIAG IV INF ADDON: CPT

## 2025-01-27 PROCEDURE — 85025 COMPLETE CBC W/AUTO DIFF WBC: CPT

## 2025-01-27 RX ORDER — METOCLOPRAMIDE HYDROCHLORIDE 5 MG/ML
10 INJECTION INTRAMUSCULAR; INTRAVENOUS ONCE
Status: COMPLETED | OUTPATIENT
Start: 2025-01-27 | End: 2025-01-27

## 2025-01-27 RX ORDER — ACETAMINOPHEN 325 MG/1
650 TABLET ORAL ONCE
Status: COMPLETED | OUTPATIENT
Start: 2025-01-27 | End: 2025-01-27

## 2025-01-27 RX ORDER — SODIUM CHLORIDE, SODIUM GLUCONATE, SODIUM ACETATE, POTASSIUM CHLORIDE, MAGNESIUM CHLORIDE, SODIUM PHOSPHATE, DIBASIC, AND POTASSIUM PHOSPHATE .53; .5; .37; .037; .03; .012; .00082 G/100ML; G/100ML; G/100ML; G/100ML; G/100ML; G/100ML; G/100ML
1000 INJECTION, SOLUTION INTRAVENOUS ONCE
Status: COMPLETED | OUTPATIENT
Start: 2025-01-27 | End: 2025-01-27

## 2025-01-27 RX ORDER — KETOROLAC TROMETHAMINE 30 MG/ML
15 INJECTION, SOLUTION INTRAMUSCULAR; INTRAVENOUS ONCE
Status: COMPLETED | OUTPATIENT
Start: 2025-01-27 | End: 2025-01-27

## 2025-01-27 RX ADMIN — ACETAMINOPHEN 650 MG: 325 TABLET, FILM COATED ORAL at 11:18

## 2025-01-27 RX ADMIN — KETOROLAC TROMETHAMINE 15 MG: 30 INJECTION, SOLUTION INTRAMUSCULAR; INTRAVENOUS at 12:32

## 2025-01-27 RX ADMIN — METOCLOPRAMIDE 10 MG: 5 INJECTION, SOLUTION INTRAMUSCULAR; INTRAVENOUS at 12:31

## 2025-01-27 RX ADMIN — SODIUM CHLORIDE, SODIUM GLUCONATE, SODIUM ACETATE, POTASSIUM CHLORIDE, MAGNESIUM CHLORIDE, SODIUM PHOSPHATE, DIBASIC, AND POTASSIUM PHOSPHATE 1000 ML: .53; .5; .37; .037; .03; .012; .00082 INJECTION, SOLUTION INTRAVENOUS at 12:32

## 2025-01-27 RX ADMIN — IOHEXOL 85 ML: 350 INJECTION, SOLUTION INTRAVENOUS at 12:13
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
## 2025-01-30 ENCOUNTER — APPOINTMENT (OUTPATIENT)
Dept: LAB | Facility: CLINIC | Age: 30
End: 2025-01-30

## 2025-01-30 ENCOUNTER — APPOINTMENT (OUTPATIENT)
Dept: URGENT CARE | Facility: CLINIC | Age: 30
End: 2025-01-30

## 2025-01-30 DIAGNOSIS — Z02.1 PRE-EMPLOYMENT EXAMINATION: ICD-10-CM

## 2025-01-30 DIAGNOSIS — Z02.1 PRE-EMPLOYMENT EXAMINATION: Primary | ICD-10-CM

## 2025-01-30 LAB — RUBV IGG SERPL IA-ACNC: <10 IU/ML

## 2025-01-30 PROCEDURE — 86787 VARICELLA-ZOSTER ANTIBODY: CPT

## 2025-01-30 PROCEDURE — 86735 MUMPS ANTIBODY: CPT

## 2025-01-30 PROCEDURE — 36415 COLL VENOUS BLD VENIPUNCTURE: CPT

## 2025-01-30 PROCEDURE — 86480 TB TEST CELL IMMUN MEASURE: CPT

## 2025-01-30 PROCEDURE — 86765 RUBEOLA ANTIBODY: CPT

## 2025-01-30 PROCEDURE — 86762 RUBELLA ANTIBODY: CPT

## 2025-01-31 LAB
GAMMA INTERFERON BACKGROUND BLD IA-ACNC: 0.04 IU/ML
M TB IFN-G BLD-IMP: NEGATIVE
M TB IFN-G CD4+ BCKGRND COR BLD-ACNC: 0 IU/ML
M TB IFN-G CD4+ BCKGRND COR BLD-ACNC: 0 IU/ML
MEV IGG SER QL IA: NORMAL
MITOGEN IGNF BCKGRD COR BLD-ACNC: 9.96 IU/ML
MUV IGG SER QL IA: NORMAL
VZV IGG SER QL IA: NORMAL

## 2025-02-26 ENCOUNTER — OFFICE VISIT (OUTPATIENT)
Dept: OBGYN CLINIC | Facility: CLINIC | Age: 30
End: 2025-02-26

## 2025-02-26 VITALS — WEIGHT: 147.4 LBS | DIASTOLIC BLOOD PRESSURE: 62 MMHG | SYSTOLIC BLOOD PRESSURE: 122 MMHG | BODY MASS INDEX: 23.09 KG/M2

## 2025-02-26 DIAGNOSIS — N75.0 BARTHOLIN'S CYST: Primary | ICD-10-CM

## 2025-02-26 PROCEDURE — 99213 OFFICE O/P EST LOW 20 MIN: CPT | Performed by: NURSE PRACTITIONER

## 2025-02-26 NOTE — PROGRESS NOTES
PROBLEM GYNECOLOGICAL VISIT    Alisson Wolf is a 29 y.o. female who presents today with complaint of Bartholin's cyst.  Her general medical history has been reviewed and she reports it as follows:    Past Medical History:   Diagnosis Date    Anemia     Facial abrasion 2018    Herpes     History of transfusion     Nasal fracture 2018    Syphilis     resolved     Past Surgical History:   Procedure Laterality Date    EXAMINATION UNDER ANESTHESIA N/A 2023    Procedure: (EUA);  Surgeon: Yardlie Toussaint-Foster, DO;  Location: [facility];  Service: Gynecology    HERNIA REPAIR      HERNIA REPAIR      umbilical hernia repair - resolved    TN HYSTEROSCOPY BX ENDOMETRIUM&/POLYPC W/WO D&C N/A 2023    Procedure: (D&C) W/ HYSTEROSCOPY;  Surgeon: Yardlie Toussaint-Foster, DO;  Location: [facility];  Service: Gynecology     OB History          0    Para   0    Term   0       0    AB   0    Living   0         SAB   0    IAB   0    Ectopic   0    Multiple   0    Live Births   0               Social History     Tobacco Use    Smoking status: Some Days     Current packs/day: 0.25     Types: Cigarettes    Smokeless tobacco: Never    Tobacco comments:     Occasional smoker   Vaping Use    Vaping status: Former    Substances: Nicotine, THC, CBD   Substance Use Topics    Alcohol use: Yes     Alcohol/week: 6.0 - 12.0 standard drinks of alcohol     Types: 6 - 12 Standard drinks or equivalent per week     Comment: 1-2x a week    Drug use: Yes     Types: Marijuana     Social History     Substance and Sexual Activity   Sexual Activity Not Currently    Partners: Male    Birth control/protection: Condom Male, I.U.D.       Current Outpatient Medications   Medication Instructions    acetaminophen (TYLENOL) 500 mg, Every 6 hours PRN    Levonorgestrel (Mirena, 52 MG,) 20 MCG/DAY IUD 1 INTRA UTERINE DEVICE BY INTRAUTERINE ROUTE ONCE FOR 1 DOSE       History of Present Illness:   Reports that she has  self-diagnosed herself with a Bartholin's gland cyst x3 since 5/2024.  States the first episode was in May of 2024 and was the largest and she used warm compresses until it finally burst and then she presented to an Urgent Care facility and they agreed with her that it was a Bartholin's gland cyst.  She reports that it occurred again several months later and again burst spontaneously, and now has occurred again for the third time and she made this appointment for evaluation but the cyst burst this morning spontaneously again.  States it is still draining dark red drainage and is slightly tender but much less tender since it burst.  She is feeling very frustrated by the recurrence of this problem and desires to have intervention so that it will not occur again.    Review of Systems:  Review of Systems   Constitutional: Negative.    Genitourinary:  Positive for genital sores (Bartholin's gland cyst - R side).       Physical Exam:  /62 (BP Location: Left arm, Patient Position: Sitting, Cuff Size: Standard)   Wt 66.9 kg (147 lb 6.4 oz)   LMP 02/19/2025 (Approximate)   BMI 23.09 kg/m²   Physical Exam  Constitutional:       General: She is not in acute distress.  Genitourinary:      Right Labia: Bartholin's cyst (draining small amount serous sanguinous fluid, cyst palpates approximately 1cm, nontender, nonerythematous).     Neurological:      Mental Status: She is alert.   Skin:     General: Skin is warm and dry.   Vitals reviewed.       Assessment:   1. R sided Bartholin's gland cyst    Plan:   1. Advised to keep clean and dry and allow to continue to drain.   2. Return to office if cyst recurs so that recurrence can be documented and discussion about marsupialization procedure can be had at that time.

## 2025-03-06 ENCOUNTER — OFFICE VISIT (OUTPATIENT)
Dept: FAMILY MEDICINE CLINIC | Facility: CLINIC | Age: 30
End: 2025-03-06
Payer: COMMERCIAL

## 2025-03-06 VITALS
TEMPERATURE: 98.9 F | OXYGEN SATURATION: 98 % | HEIGHT: 67 IN | WEIGHT: 143.2 LBS | DIASTOLIC BLOOD PRESSURE: 62 MMHG | HEART RATE: 81 BPM | RESPIRATION RATE: 18 BRPM | BODY MASS INDEX: 22.47 KG/M2 | SYSTOLIC BLOOD PRESSURE: 108 MMHG

## 2025-03-06 DIAGNOSIS — Z13.1 SCREENING FOR DIABETES MELLITUS: ICD-10-CM

## 2025-03-06 DIAGNOSIS — N92.1 MENORRHAGIA WITH IRREGULAR CYCLE: ICD-10-CM

## 2025-03-06 DIAGNOSIS — Z23 ENCOUNTER FOR IMMUNIZATION: ICD-10-CM

## 2025-03-06 DIAGNOSIS — F17.210 CIGARETTE SMOKER: ICD-10-CM

## 2025-03-06 DIAGNOSIS — Z11.59 ENCOUNTER FOR HEPATITIS C SCREENING TEST FOR LOW RISK PATIENT: ICD-10-CM

## 2025-03-06 DIAGNOSIS — D50.9 IRON DEFICIENCY ANEMIA, UNSPECIFIED IRON DEFICIENCY ANEMIA TYPE: Primary | ICD-10-CM

## 2025-03-06 DIAGNOSIS — F32.A ANXIETY AND DEPRESSION: ICD-10-CM

## 2025-03-06 DIAGNOSIS — Z13.6 SCREENING FOR CARDIOVASCULAR CONDITION: ICD-10-CM

## 2025-03-06 DIAGNOSIS — F41.9 ANXIETY AND DEPRESSION: ICD-10-CM

## 2025-03-06 DIAGNOSIS — Z11.4 ENCOUNTER FOR SCREENING FOR HUMAN IMMUNODEFICIENCY VIRUS (HIV): ICD-10-CM

## 2025-03-06 PROBLEM — Z78.9 ALCOHOL USE: Status: RESOLVED | Noted: 2023-05-03 | Resolved: 2025-03-06

## 2025-03-06 PROBLEM — F10.90 ALCOHOL USE: Status: RESOLVED | Noted: 2023-05-03 | Resolved: 2025-03-06

## 2025-03-06 PROCEDURE — 99395 PREV VISIT EST AGE 18-39: CPT | Performed by: NURSE PRACTITIONER

## 2025-03-06 PROCEDURE — 90471 IMMUNIZATION ADMIN: CPT

## 2025-03-06 PROCEDURE — 90707 MMR VACCINE SC: CPT

## 2025-03-06 NOTE — ASSESSMENT & PLAN NOTE
The patient was on Lexapro in the past with good results.  She will make an appointment with her PCP to further discuss.

## 2025-03-06 NOTE — ASSESSMENT & PLAN NOTE
Lab Results   Component Value Date    WBC 8.18 01/27/2025    HGB 8.4 (L) 01/27/2025    HCT 31.2 (L) 01/27/2025    MCV 72 (L) 01/27/2025     (H) 01/27/2025     Patient's last CBC in January did show low hemoglobin and hematocrit levels.  Iron panel was not performed at that time.  I will order an iron panel now.  The patient previously did have iron infusions.  She does feel fatigued.  Pako beverly  Orders:    Iron Panel (Includes Ferritin, Iron Sat%, Iron, and TIBC); Future    CBC and differential; Future

## 2025-03-06 NOTE — PROGRESS NOTES
Adult Annual Physical  Name: Alisson Wolf      : 1995      MRN: [id number]  Encounter Provider: JAKY Chisholm  Encounter Date: 3/6/2025   Encounter department: [facility]    Assessment & Plan  Iron deficiency anemia, unspecified iron deficiency anemia type  Lab Results   Component Value Date    WBC 8.18 2025    HGB 8.4 (L) 2025    HCT 31.2 (L) 2025    MCV 72 (L) 2025     (H) 2025     Patient's last CBC in January did show low hemoglobin and hematocrit levels.  Iron panel was not performed at that time.  I will order an iron panel now.  The patient previously did have iron infusions.  She does feel fatigued.  Pako beverly  Orders:    Iron Panel (Includes Ferritin, Iron Sat%, Iron, and TIBC); Future    CBC and differential; Future    Encounter for screening for human immunodeficiency virus (HIV)    Orders:    HIV 1/2 AG/AB w Reflex [facility] for 2 yr old and above; Future    Encounter for hepatitis C screening test for low risk patient    Orders:    Hepatitis C antibody; Future    Screening for cardiovascular condition    Orders:    Lipid Panel with Direct LDL reflex; Future    Screening for diabetes mellitus    Orders:    Hemoglobin A1C; Future    Encounter for immunization    Orders:    MMR VACCINE IM/SQ    Anxiety and depression  The patient was on Lexapro in the past with good results.  She will make an appointment with her PCP to further discuss.         Cigarette smoker  Occasional smoker.  Recommended smoking cessation in the past         Menorrhagia with irregular cycle  Much improved after placement of an IUD.         Immunizations and preventive care screenings were discussed with patient today. Appropriate education was printed on patient's after visit summary.    Counseling:  Alcohol/drug use: discussed moderation in alcohol intake, the recommendations for healthy alcohol use, and avoidance of illicit drug use.  Dental Health:  discussed importance of regular tooth brushing, flossing, and dental visits.  Injury prevention: discussed safety/seat belts, safety helmets, smoke detectors, carbon monoxide detectors, and smoking near bedding or upholstery.  Sexual health: discussed sexually transmitted diseases, partner selection, use of condoms, avoidance of unintended pregnancy, and contraceptive alternatives.  Exercise: the importance of regular exercise/physical activity was discussed. Recommend exercise 3-5 times per week for at least 30 minutes.       Depression Screening and Follow-up Plan: Patient was screened for depression during today's encounter. They screened negative with a PHQ-9 score of 2.      Tobacco Cessation Counseling: Tobacco cessation counseling was not provided. The patient is sincerely urged to quit consumption of tobacco. She is not ready to quit tobacco.         History of Present Illness     Adult Annual Physical:  Patient presents for annual physical. Patient to the office today for annual physical.  She does have a form for completion.  She will be going to obtain her LPNs.  Surveillance blood work ordered.  She did have titers performed last week and is not immune to rubella.  She will receive an MMR today..     Diet and Physical Activity:  - Diet/Nutrition: well balanced diet, consuming 3-5 servings of fruits/vegetables daily and low carb diet.  - Exercise: walking and 5-7 times a week on average.    Depression Screening:    - PHQ-9 Score: 2    General Health:  - Sleep: 7-8 hours of sleep on average and sleeps well.  - Hearing: normal hearing bilateral ears.  - Vision: goes for regular eye exams and wears glasses.  - Dental: regular dental visits and brushes teeth twice daily.    /GYN Health:  - Follows with GYN: yes.   - Menopause: premenopausal.   - History of STDs: yes  - Contraception: IUD placement. gonorrhea, herpes      Review of Systems   Constitutional:  Positive for fatigue. Negative for activity change  and fever.   HENT:  Negative for congestion, hearing loss, rhinorrhea, trouble swallowing and voice change.    Eyes:  Negative for photophobia, pain, discharge and visual disturbance.   Respiratory:  Negative for cough, chest tightness and shortness of breath.    Cardiovascular:  Negative for chest pain, palpitations and leg swelling.   Gastrointestinal:  Negative for abdominal pain, blood in stool, constipation, nausea and vomiting.   Endocrine: Negative for cold intolerance and heat intolerance.   Genitourinary:  Negative for difficulty urinating, frequency, hematuria, urgency, vaginal bleeding and vaginal discharge.   Musculoskeletal:  Negative for arthralgias and myalgias.   Skin: Negative.    Neurological:  Negative for dizziness, weakness, numbness and headaches.   Psychiatric/Behavioral:  Negative for decreased concentration. The patient is not nervous/anxious.      Current Outpatient Medications on File Prior to Visit   Medication Sig Dispense Refill    acetaminophen (TYLENOL) 500 mg tablet Take 500 mg by mouth every 6 (six) hours as needed for mild pain      Levonorgestrel (Mirena, 52 MG,) 20 MCG/DAY IUD 1 INTRA UTERINE DEVICE BY INTRAUTERINE ROUTE ONCE FOR 1 DOSE 1 Intra Uterine Device 0     No current facility-administered medications on file prior to visit.      Social History     Tobacco Use    Smoking status: Some Days     Current packs/day: 0.25     Types: Cigarettes     Passive exposure: Past    Smokeless tobacco: Never    Tobacco comments:     Occasional smoker   Vaping Use    Vaping status: Former    Substances: Nicotine, THC, CBD   Substance and Sexual Activity    Alcohol use: Yes     Alcohol/week: 6.0 - 12.0 standard drinks of alcohol     Types: 6 - 12 Standard drinks or equivalent per week     Comment: 1-2x a week    Drug use: Yes     Types: Marijuana    Sexual activity: Not Currently     Partners: Male     Birth control/protection: Condom Male, I.U.D.       Objective   /62   Pulse 81    "Temp 98.9 °F (37.2 °C) (Temporal)   Resp 18   Ht 5' 7\" (1.702 m)   Wt 65 kg (143 lb 3.2 oz)   LMP 02/19/2025 (Approximate)   SpO2 98%   BMI 22.43 kg/m²     Physical Exam  Vitals and nursing note reviewed.   Constitutional:       General: She is not in acute distress.     Appearance: Normal appearance.   HENT:      Head: Normocephalic and atraumatic.      Right Ear: Tympanic membrane, ear canal and external ear normal. There is no impacted cerumen.      Left Ear: Tympanic membrane, ear canal and external ear normal. There is no impacted cerumen.      Nose: Nose normal.      Mouth/Throat:      Mouth: Mucous membranes are moist.      Pharynx: Oropharynx is clear. No posterior oropharyngeal erythema.   Eyes:      General:         Right eye: No discharge.         Left eye: No discharge.      Extraocular Movements: Extraocular movements intact.      Pupils: Pupils are equal, round, and reactive to light.   Cardiovascular:      Rate and Rhythm: Normal rate and regular rhythm.      Pulses: Normal pulses.      Heart sounds: Normal heart sounds. No murmur heard.  Pulmonary:      Effort: Pulmonary effort is normal.      Breath sounds: Normal breath sounds.   Abdominal:      General: Abdomen is flat. Bowel sounds are normal.      Palpations: Abdomen is soft.   Musculoskeletal:         General: Normal range of motion.      Cervical back: Normal range of motion.   Skin:     General: Skin is warm and dry.      Capillary Refill: Capillary refill takes less than 2 seconds.   Neurological:      General: No focal deficit present.      Mental Status: She is alert and oriented to person, place, and time. Mental status is at baseline.   Psychiatric:         Mood and Affect: Mood normal.         Behavior: Behavior normal.         Thought Content: Thought content normal.         Judgment: Judgment normal.         "

## 2025-03-10 DIAGNOSIS — Z00.6 ENCOUNTER FOR EXAMINATION FOR NORMAL COMPARISON OR CONTROL IN CLINICAL RESEARCH PROGRAM: ICD-10-CM

## 2025-05-12 ENCOUNTER — OFFICE VISIT (OUTPATIENT)
Dept: URGENT CARE | Age: 30
End: 2025-05-12
Payer: COMMERCIAL

## 2025-05-12 VITALS
SYSTOLIC BLOOD PRESSURE: 112 MMHG | RESPIRATION RATE: 18 BRPM | WEIGHT: 145 LBS | BODY MASS INDEX: 22.71 KG/M2 | TEMPERATURE: 99.1 F | DIASTOLIC BLOOD PRESSURE: 64 MMHG

## 2025-05-12 DIAGNOSIS — K59.00 ACUTE CONSTIPATION: Primary | ICD-10-CM

## 2025-05-12 PROCEDURE — 99213 OFFICE O/P EST LOW 20 MIN: CPT | Performed by: PHYSICIAN ASSISTANT

## 2025-05-12 NOTE — PATIENT INSTRUCTIONS
Medication Available without a prescription (over-the-counter)? Usual adult dose (oral, unless noted) Onset of action Common side effects   Bulk-forming laxatives*   Psyllium Yes Powder: Up to 1 teaspoon (?3.5 g fiber) 3 times per day 12 to 72 hours Impaction above strictures, fluid overload, gas and bloating   Methylcellulose Yes Caplet: Up to 4 caplets (500 mg fiber per caplet) 3 times per day  Powder: Up to 1 tablespoon (?2 g fiber) 3 times per day 12 to 72 hours    Polycarbophil Yes Tablet: 2 tablets (500 mg fiber per tablet) up to 4 times per day 24 to 48 hours    Wheat dextrin Yes Caplet: 3 caplets (1 g fiber per caplet) up to 3 times per day  Chewable tablet: 3 tablets (1 g fiber per tablet) up to 3 times per day  Gummy: 3 gummies (2 g fiber per gummy) up to 3 times per day  Powder: 2 teaspoons (?1.5 g fiber per teaspoon) up to 3 times per day 24 to 48 hours    Osmotic agents   Polyethylene glycol 3350 (PEG 3350; also called macrogol) Yes 8.5 to 34 g once daily; dissolve dose in 240 mL (8 ounces) liquid 1 to 4 days Nausea, bloating, cramping   Lactulose No 10 to 20 g (15 to 30 mL or 1 to 2 packets) once daily or every other day; may increase up to 2 times per day 24 to 48 hours Abdominal bloating, flatulence   Sorbitol Yes 30 to 45 mL (70% solution) once daily 24 to 48 hours Abdominal bloating, flatulence   Glycerin (glycerol) Yes One suppository (2 g) per rectum once daily; retain for approximately 15 minutes 15 to 60 minutes Rectal irritation   Magnesium sulfate Yes 2 to 4 level teaspoons (approximately 10 to 20 g) of granules once daily; may repeat in 4 hours. Dissolve each dose in 240 mL (8 ounces) of water. Do not exceed 2 doses per day. 0.5 to 3 hours Watery stools and urgency; caution in kidney insufficiency (magnesium toxicity)   Magnesium citrate Yes 200 mL (11.6 g) once daily 0.5 to 3 hours    Magnesium hydroxide Yes 400 mg/5 mL solution: 15 to 60 mL once daily 0.5 to 6 hours    Stimulant laxatives    Bisacodyl Yes Enteric-coated tablet: 5 to 15 once daily as needed 6 to 10 hours Gastric irritation    Yes Suppository: 10 mg per rectum once daily as needed; retain for approximately 15 minutes 15 to 60 minutes Rectal irritation   Senna Yes 2 to 4 tablets (8.6 mg sennosides per tablet) or 1 to 2 tablets (15 mg sennosides per tablet) as a single daily dose or divided twice daily as needed 6 to 12 hours Melanosis coli   Sodium picosulfate (where available; not available in the [place]) Yes Oral solution: 5 to 10 mg once daily as needed; dose volume differs based on formulation, refer to local product information  Tablet: 5 to 10 mg once daily as needed 6 to 12 hours Diarrhea, abdominal pain   Secretagogues   Linaclotide No 145 micrograms once daily on an empty stomach >=30 minutes before a meal 12 to 24 hours Diarrhea, bloating   Plecanatide No 3 mg once daily 12 to 24 hours Diarrhea   Lubiprostone No 24 micrograms twice daily 24 to 48 hours Nausea, diarrhea   Other agents   Tenapanor (sodium/hydrogen exchanger 3 inhibitor) No 50 mg twice daily 12 to 24 hours Diarrhea   Prucalopride (prokinetic agent) No 2 mg once daily 6 to 12 hours Nausea, headache, diarrhea   Vibrating capsule (nonpharmacologic) No 1 capsule once daily at bedtime, 5 days a week (ie, 3 days on, 1 day off, 2 days on, 1 day off) 6 to 18 hours Vibrating sensation   Stool softeners (surfactant agents)   Docusate sodium Yes 100 mg once or twice daily 24 to 72 hours Well tolerated, but less effective than other agents. Use lower dose if administered with another laxative. Contact dermatitis reported.   Docusate calcium Yes 240 mg once daily 24 to 72 hours

## 2025-05-12 NOTE — PROGRESS NOTES
[facility]        NAME: Alisson Wolf is a 30 y.o. female  : 1995    MRN: [id number]  DATE: May 12, 2025  TIME: 3:03 PM    Assessment and Plan   Acute constipation [K59.00]  1. Acute constipation            I advised the patient to continue with the suppository if it is effective she can also trial oral magnesium citrate as a stimulant laxative as well as take an osmotic laxative as well.  If these fail to produce a bowel movement in the next 24 hours or if she worsens anytime next 24 hours prior to seeing her PCP she should go to the ER for further management.  At this time she does have bowel sounds does not have any peritoneal signs on examination she does have some mild discomfort left lower quadrant on examination but no evidence to suggest bowel obstruction or Bowel perforation on examination.    The patient and/or parent/legal guardian verbalized understanding of exam findings and   Treatment plan. We engaged in the shared decision-making process and treatment options were   discussed at length with the patient.  All questions, concerns and complaints were answered and   addressed to the patient's' and/or parent/legal guardians's satisfaction.    Patient Instructions     Patient Instructions     Medication Available without a prescription (over-the-counter)? Usual adult dose (oral, unless noted) Onset of action Common side effects   Bulk-forming laxatives*   Psyllium Yes Powder: Up to 1 teaspoon (?3.5 g fiber) 3 times per day 12 to 72 hours Impaction above strictures, fluid overload, gas and bloating   Methylcellulose Yes Caplet: Up to 4 caplets (500 mg fiber per caplet) 3 times per day  Powder: Up to 1 tablespoon (?2 g fiber) 3 times per day 12 to 72 hours    Polycarbophil Yes Tablet: 2 tablets (500 mg fiber per tablet) up to 4 times per day 24 to 48 hours    Wheat dextrin Yes Caplet: 3 caplets (1 g fiber per caplet) up to 3 times per day  Chewable tablet: 3 tablets (1 g fiber per  tablet) up to 3 times per day  Gummy: 3 gummies (2 g fiber per gummy) up to 3 times per day  Powder: 2 teaspoons (?1.5 g fiber per teaspoon) up to 3 times per day 24 to 48 hours    Osmotic agents   Polyethylene glycol 3350 (PEG 3350; also called macrogol) Yes 8.5 to 34 g once daily; dissolve dose in 240 mL (8 ounces) liquid 1 to 4 days Nausea, bloating, cramping   Lactulose No 10 to 20 g (15 to 30 mL or 1 to 2 packets) once daily or every other day; may increase up to 2 times per day 24 to 48 hours Abdominal bloating, flatulence   Sorbitol Yes 30 to 45 mL (70% solution) once daily 24 to 48 hours Abdominal bloating, flatulence   Glycerin (glycerol) Yes One suppository (2 g) per rectum once daily; retain for approximately 15 minutes 15 to 60 minutes Rectal irritation   Magnesium sulfate Yes 2 to 4 level teaspoons (approximately 10 to 20 g) of granules once daily; may repeat in 4 hours. Dissolve each dose in 240 mL (8 ounces) of water. Do not exceed 2 doses per day. 0.5 to 3 hours Watery stools and urgency; caution in kidney insufficiency (magnesium toxicity)   Magnesium citrate Yes 200 mL (11.6 g) once daily 0.5 to 3 hours    Magnesium hydroxide Yes 400 mg/5 mL solution: 15 to 60 mL once daily 0.5 to 6 hours    Stimulant laxatives   Bisacodyl Yes Enteric-coated tablet: 5 to 15 once daily as needed 6 to 10 hours Gastric irritation    Yes Suppository: 10 mg per rectum once daily as needed; retain for approximately 15 minutes 15 to 60 minutes Rectal irritation   Senna Yes 2 to 4 tablets (8.6 mg sennosides per tablet) or 1 to 2 tablets (15 mg sennosides per tablet) as a single daily dose or divided twice daily as needed 6 to 12 hours Melanosis coli   Sodium picosulfate (where available; not available in the [place]) Yes Oral solution: 5 to 10 mg once daily as needed; dose volume differs based on formulation, refer to local product information  Tablet: 5 to 10 mg once daily as needed 6 to 12 hours Diarrhea, abdominal  pain   Secretagogues   Linaclotide No 145 micrograms once daily on an empty stomach >=30 minutes before a meal 12 to 24 hours Diarrhea, bloating   Plecanatide No 3 mg once daily 12 to 24 hours Diarrhea   Lubiprostone No 24 micrograms twice daily 24 to 48 hours Nausea, diarrhea   Other agents   Tenapanor (sodium/hydrogen exchanger 3 inhibitor) No 50 mg twice daily 12 to 24 hours Diarrhea   Prucalopride (prokinetic agent) No 2 mg once daily 6 to 12 hours Nausea, headache, diarrhea   Vibrating capsule (nonpharmacologic) No 1 capsule once daily at bedtime, 5 days a week (ie, 3 days on, 1 day off, 2 days on, 1 day off) 6 to 18 hours Vibrating sensation   Stool softeners (surfactant agents)   Docusate sodium Yes 100 mg once or twice daily 24 to 72 hours Well tolerated, but less effective than other agents. Use lower dose if administered with another laxative. Contact dermatitis reported.   Docusate calcium Yes 240 mg once daily 24 to 72 hours        Follow up with PCP in 3-5 days.  Proceed to  ER if symptoms worsen.    If tests are performed, our office will contact you with results only if   changes need to made to the care plan discussed with you at the visit.   You can review your full results on [facility].     Chief Complaint     Chief Complaint   Patient presents with   • Constipation     Pt reports constipation for past 10 days. Has done enemas x2 and suppositories, nothing PO. Intermittent flatulence. Bloating and distended. Normal appetite.          History of Present Illness       HPI  Patient is reporting constipation for the past 10 days had done 2 enemas and suppositories she does have flatus.  She reports normal appetite with some mild bloating. Pt reports cramping in abdomen about 3-4/10. Suppositories have produced small bowel movements. Denies any history of GI issues, no dietary or med changes. No abdominal surgeries in past. No fever or chills. No nausea.     Review of Systems   Review of  Systems  All other related systems reviewed with patient or accompanying historian and are negative except as noted in HPI    Current Medications       Current Outpatient Medications:   •  Levonorgestrel (Mirena, 52 MG,) 20 MCG/DAY IUD, 1 INTRA UTERINE DEVICE BY INTRAUTERINE ROUTE ONCE FOR 1 DOSE, Disp: 1 Intra Uterine Device, Rfl: 0  •  acetaminophen (TYLENOL) 500 mg tablet, Take 500 mg by mouth every 6 (six) hours as needed for mild pain, Disp: , Rfl:     Current Allergies     Allergies as of 05/12/2025   • (No Known Allergies)            The following portions of the patient's history were reviewed and updated as appropriate: allergies, current medications, past family history, past medical history, past social history, past surgical history and problem list.     Past Medical History:   Diagnosis Date   • Alcohol use 05/03/2023   • Anemia    • Facial abrasion 08/22/2018   • Herpes    • History of transfusion    • Nasal fracture 08/22/2018   • Syphilis     resolved       Past Surgical History:   Procedure Laterality Date   • EXAMINATION UNDER ANESTHESIA N/A 7/24/2023    Procedure: (EUA);  Surgeon: Yardlie Toussaint-Foster, DO;  Location: [facility];  Service: Gynecology   • HERNIA REPAIR     • HERNIA REPAIR  1999    umbilical hernia repair - resolved   • IL HYSTEROSCOPY BX ENDOMETRIUM&/POLYPC W/WO D&C N/A 7/24/2023    Procedure: (D&C) W/ HYSTEROSCOPY;  Surgeon: Yardlie Toussaint-Foster, DO;  Location: [facility];  Service: Gynecology       Family History   Problem Relation Age of Onset   • Thyroid disease Mother    • Asthma Mother    • Coronary artery disease Mother    • Hypertension Mother    • No Known Problems Brother    • No Known Problems Brother    • Diabetes Maternal Grandmother    • Hypertension Maternal Grandmother    • Stroke Maternal Grandfather         syndrome         Medications have been verified.        Objective   /64   Temp 99.1 °F (37.3 °C)   Resp 18   Wt 65.8 kg (145 lb)   BMI 22.71  "kg/m²   No LMP recorded. Patient has had an implant.       Physical Exam     Physical Exam  Constitutional:       General: She is not in acute distress.     Appearance: She is well-developed.   HENT:      Head: Normocephalic and atraumatic.   Eyes:      General: No scleral icterus.     Conjunctiva/sclera: Conjunctivae normal.   Neck:      Trachea: No tracheal deviation.   Pulmonary:      Effort: Pulmonary effort is normal. No respiratory distress.      Breath sounds: No stridor.   Abdominal:      General: Abdomen is flat. Bowel sounds are normal. There is no distension.      Palpations: Abdomen is soft.      Tenderness: There is abdominal tenderness (llq mild tenderness). There is no guarding or rebound.   Genitourinary:     Comments: Rectal exam deferred  Musculoskeletal:      Cervical back: Normal range of motion.   Skin:     General: Skin is warm and dry.      Findings: No erythema.   Neurological:      Mental Status: She is alert and oriented to person, place, and time.   Psychiatric:         Behavior: Behavior normal.         Ortho Exam        Procedures  No Procedures performed today        Note: Portions of this record may have been created with voice recognition software. Occasional wrong word or \"sound a like\" substitutions may have occurred due to the inherent limitations of voice recognition software. Please read the chart carefully and recognize, using context, where substitutions have occurred.*      "

## 2025-05-24 ENCOUNTER — APPOINTMENT (OUTPATIENT)
Dept: LAB | Facility: CLINIC | Age: 30
End: 2025-05-24
Attending: PATHOLOGY
Payer: COMMERCIAL

## 2025-05-24 DIAGNOSIS — Z00.6 ENCOUNTER FOR EXAMINATION FOR NORMAL COMPARISON OR CONTROL IN CLINICAL RESEARCH PROGRAM: ICD-10-CM

## 2025-05-24 DIAGNOSIS — Z13.1 SCREENING FOR DIABETES MELLITUS: ICD-10-CM

## 2025-05-24 DIAGNOSIS — Z11.59 ENCOUNTER FOR HEPATITIS C SCREENING TEST FOR LOW RISK PATIENT: ICD-10-CM

## 2025-05-24 DIAGNOSIS — Z13.6 SCREENING FOR CARDIOVASCULAR CONDITION: ICD-10-CM

## 2025-05-24 DIAGNOSIS — Z11.4 ENCOUNTER FOR SCREENING FOR HUMAN IMMUNODEFICIENCY VIRUS (HIV): ICD-10-CM

## 2025-05-24 DIAGNOSIS — D50.9 IRON DEFICIENCY ANEMIA, UNSPECIFIED IRON DEFICIENCY ANEMIA TYPE: ICD-10-CM

## 2025-05-24 LAB
BASOPHILS # BLD AUTO: 0.1 THOUSANDS/ÂΜL (ref 0–0.1)
BASOPHILS NFR BLD AUTO: 3 % (ref 0–1)
CHOLEST SERPL-MCNC: 139 MG/DL (ref ?–200)
EOSINOPHIL # BLD AUTO: 0.11 THOUSAND/ÂΜL (ref 0–0.61)
EOSINOPHIL NFR BLD AUTO: 3 % (ref 0–6)
ERYTHROCYTE [DISTWIDTH] IN BLOOD BY AUTOMATED COUNT: 19.4 % (ref 11.6–15.1)
FERRITIN SERPL-MCNC: 1 NG/ML (ref 30–307)
HCT VFR BLD AUTO: 30.8 % (ref 34.8–46.1)
HDLC SERPL-MCNC: 50 MG/DL
HGB BLD-MCNC: 8.2 G/DL (ref 11.5–15.4)
IMM GRANULOCYTES # BLD AUTO: 0.01 THOUSAND/UL (ref 0–0.2)
IMM GRANULOCYTES NFR BLD AUTO: 0 % (ref 0–2)
IRON SERPL-MCNC: <10 UG/DL (ref 50–212)
LDLC SERPL CALC-MCNC: 81 MG/DL (ref 0–100)
LYMPHOCYTES # BLD AUTO: 1.02 THOUSANDS/ÂΜL (ref 0.6–4.47)
LYMPHOCYTES NFR BLD AUTO: 30 % (ref 14–44)
MCH RBC QN AUTO: 18.6 PG (ref 26.8–34.3)
MCHC RBC AUTO-ENTMCNC: 26.6 G/DL (ref 31.4–37.4)
MCV RBC AUTO: 70 FL (ref 82–98)
MONOCYTES # BLD AUTO: 0.41 THOUSAND/ÂΜL (ref 0.17–1.22)
MONOCYTES NFR BLD AUTO: 12 % (ref 4–12)
NEUTROPHILS # BLD AUTO: 1.8 THOUSANDS/ÂΜL (ref 1.85–7.62)
NEUTS SEG NFR BLD AUTO: 52 % (ref 43–75)
NRBC BLD AUTO-RTO: 0 /100 WBCS
PLATELET # BLD AUTO: 458 THOUSANDS/UL (ref 149–390)
PMV BLD AUTO: 10.8 FL (ref 8.9–12.7)
RBC # BLD AUTO: 4.42 MILLION/UL (ref 3.81–5.12)
TIBC SERPL-MCNC: 473.2 UG/DL (ref 250–450)
TRANSFERRIN SERPL-MCNC: 338 MG/DL (ref 203–362)
TRIGL SERPL-MCNC: 41 MG/DL (ref ?–150)
WBC # BLD AUTO: 3.45 THOUSAND/UL (ref 4.31–10.16)

## 2025-05-24 PROCEDURE — 87389 HIV-1 AG W/HIV-1&-2 AB AG IA: CPT

## 2025-05-24 PROCEDURE — 36415 COLL VENOUS BLD VENIPUNCTURE: CPT

## 2025-05-24 PROCEDURE — 83550 IRON BINDING TEST: CPT

## 2025-05-24 PROCEDURE — 82728 ASSAY OF FERRITIN: CPT

## 2025-05-24 PROCEDURE — 80061 LIPID PANEL: CPT

## 2025-05-24 PROCEDURE — 83540 ASSAY OF IRON: CPT

## 2025-05-24 PROCEDURE — 86803 HEPATITIS C AB TEST: CPT

## 2025-05-24 PROCEDURE — 85025 COMPLETE CBC W/AUTO DIFF WBC: CPT

## 2025-05-24 PROCEDURE — 83036 HEMOGLOBIN GLYCOSYLATED A1C: CPT

## 2025-05-25 LAB
EST. AVERAGE GLUCOSE BLD GHB EST-MCNC: 114 MG/DL
HBA1C MFR BLD: 5.6 %
HCV AB SER QL: NORMAL
HIV 1+2 AB+HIV1 P24 AG SERPL QL IA: NORMAL

## 2025-05-30 ENCOUNTER — TELEPHONE (OUTPATIENT)
Dept: HEMATOLOGY ONCOLOGY | Facility: CLINIC | Age: 30
End: 2025-05-30

## 2025-05-30 ENCOUNTER — OFFICE VISIT (OUTPATIENT)
Dept: HEMATOLOGY ONCOLOGY | Facility: CLINIC | Age: 30
End: 2025-05-30
Payer: COMMERCIAL

## 2025-05-30 VITALS
TEMPERATURE: 98 F | BODY MASS INDEX: 23.07 KG/M2 | DIASTOLIC BLOOD PRESSURE: 64 MMHG | RESPIRATION RATE: 16 BRPM | WEIGHT: 147 LBS | HEIGHT: 67 IN | OXYGEN SATURATION: 99 % | SYSTOLIC BLOOD PRESSURE: 120 MMHG | HEART RATE: 70 BPM

## 2025-05-30 DIAGNOSIS — K59.09 OTHER CONSTIPATION: ICD-10-CM

## 2025-05-30 DIAGNOSIS — D50.9 IRON DEFICIENCY ANEMIA, UNSPECIFIED IRON DEFICIENCY ANEMIA TYPE: Primary | ICD-10-CM

## 2025-05-30 PROCEDURE — 99214 OFFICE O/P EST MOD 30 MIN: CPT | Performed by: PHYSICIAN ASSISTANT

## 2025-05-30 RX ORDER — SODIUM CHLORIDE 9 MG/ML
20 INJECTION, SOLUTION INTRAVENOUS ONCE
Status: CANCELLED | OUTPATIENT
Start: 2025-06-06

## 2025-05-30 RX ORDER — PNV NO.95/FERROUS FUM/FOLIC AC 28MG-0.8MG
1 TABLET ORAL 3 TIMES WEEKLY
COMMUNITY
End: 2025-05-30
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
## 2025-05-30 NOTE — PATIENT INSTRUCTIONS
[facility] Medical Oncology and Hematology Team  [place] - [phone]    Your Team Member:  Advanced Practitioner:  Chasidy Prescott PA-C    Please answer Private and Unavailable Calls - this may be your team(s) contacting you.  If you have medical questions/concerns/issues - contact us either by (1) My Chart (2) [place]

## 2025-05-30 NOTE — ASSESSMENT & PLAN NOTE
Severe iron deficiency anemia.    This is a 30-year-old female with long history of iron deficiency for the last 7 years.  Patient has been on and off birth control to help with menstrual bleeding.  Most recent event has occurred while the patient has an IUD placed.  Iron levels are barely detectable with a ferritin of 1.  Recommendation is to undergo Venofer treatments.  Hemoglobin is not as low as it has been in the past.  Patient is symptomatic and does require treatment soon as possible.  This office will help arrange.    Patient has not had any side effects or toxicities of IV iron.  Oral iron is not an option secondary to constipation and the symptomatic anemia for which we are trying to quickly reverse.  Other substrate deficiencies will be checked upon especially in light of lack of just pure blood loss related anemia due to heavy menstruation.  Follow-up in 4 months advised    Regimen:  Venofer 300 mg IV weekly x 6 doses  Orders:    Ambulatory referral to Gastroenterology; Future    CBC and differential; Future    Iron Panel (Includes Ferritin, Iron Sat%, Iron, and TIBC); Future    Vitamin B12; Future    Folate; Future

## 2025-06-06 ENCOUNTER — OFFICE VISIT (OUTPATIENT)
Dept: GASTROENTEROLOGY | Facility: CLINIC | Age: 30
End: 2025-06-06
Payer: COMMERCIAL

## 2025-06-06 ENCOUNTER — HOSPITAL ENCOUNTER (OUTPATIENT)
Dept: INFUSION CENTER | Facility: HOSPITAL | Age: 30
End: 2025-06-06
Attending: INTERNAL MEDICINE
Payer: COMMERCIAL

## 2025-06-06 VITALS
DIASTOLIC BLOOD PRESSURE: 80 MMHG | BODY MASS INDEX: 23.57 KG/M2 | HEIGHT: 67 IN | WEIGHT: 150.2 LBS | TEMPERATURE: 98.4 F | SYSTOLIC BLOOD PRESSURE: 120 MMHG

## 2025-06-06 VITALS
SYSTOLIC BLOOD PRESSURE: 134 MMHG | TEMPERATURE: 97.8 F | HEART RATE: 90 BPM | RESPIRATION RATE: 18 BRPM | DIASTOLIC BLOOD PRESSURE: 77 MMHG

## 2025-06-06 DIAGNOSIS — K21.9 GASTROESOPHAGEAL REFLUX DISEASE WITHOUT ESOPHAGITIS: ICD-10-CM

## 2025-06-06 DIAGNOSIS — K59.00 CONSTIPATION, UNSPECIFIED CONSTIPATION TYPE: ICD-10-CM

## 2025-06-06 DIAGNOSIS — D50.9 IRON DEFICIENCY ANEMIA, UNSPECIFIED IRON DEFICIENCY ANEMIA TYPE: Primary | ICD-10-CM

## 2025-06-06 DIAGNOSIS — N92.1 MENORRHAGIA WITH IRREGULAR CYCLE: ICD-10-CM

## 2025-06-06 DIAGNOSIS — D50.0 IRON DEFICIENCY ANEMIA DUE TO CHRONIC BLOOD LOSS: Primary | ICD-10-CM

## 2025-06-06 LAB
APOB+LDLR+PCSK9 GENE MUT ANL BLD/T: NOT DETECTED
BRCA1+BRCA2 DEL+DUP + FULL MUT ANL BLD/T: NOT DETECTED
MLH1+MSH2+MSH6+PMS2 GN DEL+DUP+FUL M: NOT DETECTED

## 2025-06-06 PROCEDURE — 96365 THER/PROPH/DIAG IV INF INIT: CPT

## 2025-06-06 PROCEDURE — 99244 OFF/OP CNSLTJ NEW/EST MOD 40: CPT | Performed by: INTERNAL MEDICINE

## 2025-06-06 RX ORDER — SODIUM CHLORIDE 9 MG/ML
20 INJECTION, SOLUTION INTRAVENOUS ONCE
Status: COMPLETED | OUTPATIENT
Start: 2025-06-06 | End: 2025-06-06

## 2025-06-06 RX ORDER — SODIUM CHLORIDE 9 MG/ML
20 INJECTION, SOLUTION INTRAVENOUS ONCE
Status: CANCELLED | OUTPATIENT
Start: 2025-06-13

## 2025-06-06 RX ORDER — SODIUM CHLORIDE, SODIUM LACTATE, POTASSIUM CHLORIDE, CALCIUM CHLORIDE 600; 310; 30; 20 MG/100ML; MG/100ML; MG/100ML; MG/100ML
125 INJECTION, SOLUTION INTRAVENOUS CONTINUOUS
OUTPATIENT
Start: 2025-06-06

## 2025-06-06 RX ADMIN — SODIUM CHLORIDE 20 ML/HR: 0.9 INJECTION, SOLUTION INTRAVENOUS at 10:02

## 2025-06-06 RX ADMIN — IRON SUCROSE 300 MG: 20 INJECTION, SOLUTION INTRAVENOUS at 10:07

## 2025-06-06 NOTE — PROGRESS NOTES
Name: Alisson Wolf      : 1995      MRN: [id number]  Encounter Provider: Roel Dockery MD  Encounter Date: 2025   Encounter department: [facility] [place]  :  Assessment & Plan  Iron deficiency anemia due to chronic blood loss  - Ferritin level at 1.  - Initiated IV iron infusions.  - Schedule endoscopy and colonoscopy to investigate potential causes, including celiac disease and other gastrointestinal issues.  - Explained procedures, including bowel prep for colonoscopy and same-day nature of both procedures.  Orders:    Colonoscopy; Future    EGD; Future    polyethylene glycol (GOLYTELY) 4000 mL solution; Take 4,000 mL by mouth once for 1 dose    Menorrhagia with irregular cycle  She has menorrhagia and this is probably the cause of her iron deficiency anemia.  She will continue to follow-up with her gynecologist for further evaluation and management.       Constipation, unspecified constipation type  - Recent constipation lasting >10 days with incomplete relief.  - Previous use of MiraLAX, senna, suppositories, and enemas without full relief.  - No diarrhea or blood in stool.  - Endoscopy and colonoscopy to rule out underlying causes.  Orders:    polyethylene glycol (GOLYTELY) 4000 mL solution; Take 4,000 mL by mouth once for 1 dose    Gastroesophageal reflux disease without esophagitis  - Episodes of heartburn.  - Endoscopy to investigate potential causes such as ulcers or other gastrointestinal issues.             History of Present Illness     History of Present Illness  The patient presents for evaluation of chronic iron deficiency, constipation, and heartburn.    She was referred to our gastroenterology department by her hematologist due to a long-standing history of chronic iron deficiency, which has been present since her teenage years. This condition initially manifested as heavy menstrual cycles, but these have since been regulated with the use of  Mirena for the past 3 years. Despite this, recent laboratory results indicate a significantly low ferritin level of 1. She is currently receiving iron infusions and has discontinued oral iron supplementation.    She also reports experiencing slow motility and constipation, which she attributes to potential dietary factors. The most recent episode of constipation lasted more than 10 days, and she reports incomplete bowel movements. These symptoms have been present for less than 5 years. She reports no diarrhea or blood in her stool. She has tried various treatments for her constipation, including Senokot, MiraLAX, suppositories, and enemas, but only found relief with MiraLAX. She discontinued MiraLAX after achieving relief but still has it at home. She reports no black-colored stools.    Additionally, she experiences occasional heartburn episodes. She believes her hematologist may want her to undergo an endoscopy and colonoscopy.    Review of Systems   Constitutional:  Negative for chills, fatigue, fever and unexpected weight change.   HENT:  Negative for trouble swallowing.    Eyes:  Negative for visual disturbance.   Respiratory:  Negative for cough and shortness of breath.    Cardiovascular:  Negative for chest pain.   Gastrointestinal:  Positive for constipation. Negative for abdominal distention, abdominal pain, blood in stool, diarrhea, nausea and vomiting.   Genitourinary:  Positive for menstrual problem and vaginal bleeding.   Musculoskeletal:  Negative for arthralgias, gait problem and myalgias.   Skin:  Negative for pallor and rash.   Neurological:  Negative for dizziness, weakness and headaches.   Hematological:  Negative for adenopathy. Does not bruise/bleed easily.   Psychiatric/Behavioral:  Negative for dysphoric mood. The patient is not nervous/anxious.     A complete review of systems is negative other than that noted above in the HPI.      Current Medications[1]  Objective   /80 (BP Location:  "Left arm, Patient Position: Sitting, Cuff Size: Standard)   Temp 98.4 °F (36.9 °C) (Tympanic)   Ht 5' 7\" (1.702 m)   Wt 68.1 kg (150 lb 3.2 oz)   BMI 23.52 kg/m²     Physical Exam  Constitutional:       Appearance: She is well-developed.   HENT:      Head: Normocephalic and atraumatic.     Eyes:      General: No scleral icterus.     Conjunctiva/sclera: Conjunctivae normal.       Cardiovascular:      Rate and Rhythm: Normal rate and regular rhythm.      Heart sounds: Normal heart sounds.   Pulmonary:      Effort: Pulmonary effort is normal.      Breath sounds: Normal breath sounds. No wheezing.   Abdominal:      General: Bowel sounds are normal. There is no distension.      Palpations: Abdomen is soft. There is no mass.      Tenderness: There is abdominal tenderness. There is no guarding or rebound.     Musculoskeletal:         General: Normal range of motion.      Cervical back: Normal range of motion and neck supple.   Lymphadenopathy:      Cervical: No cervical adenopathy.     Skin:     General: Skin is warm and dry.      Findings: No rash.     Neurological:      Mental Status: She is alert and oriented to person, place, and time.        Physical Exam  HEENT: Oropharynx clear, no abnormalities noted.  Heart: Heart sounds normal.  Lungs: Lungs clear to auscultation bilaterally.  Abdomen: Mild tenderness noted upon palpation.    Results  Labs   - Ferritin level: 1 ng/mL  Lab Results: I personally reviewed relevant lab results.                    [1]   Current Outpatient Medications   Medication Sig Dispense Refill    acetaminophen (TYLENOL) 500 mg tablet Take 500 mg by mouth every 6 (six) hours as needed for mild pain      Levonorgestrel (Mirena, 52 MG,) 20 MCG/DAY IUD 1 INTRA UTERINE DEVICE BY INTRAUTERINE ROUTE ONCE FOR 1 DOSE 1 Intra Uterine Device 0    polyethylene glycol (GOLYTELY) 4000 mL solution Take 4,000 mL by mouth once for 1 dose 4000 mL 0     No current facility-administered medications for this " visit.

## 2025-06-06 NOTE — ASSESSMENT & PLAN NOTE
- Ferritin level at 1.  - Initiated IV iron infusions.  - Schedule endoscopy and colonoscopy to investigate potential causes, including celiac disease and other gastrointestinal issues.  - Explained procedures, including bowel prep for colonoscopy and same-day nature of both procedures.  Orders:    Colonoscopy; Future    EGD; Future    polyethylene glycol (GOLYTELY) 4000 mL solution; Take 4,000 mL by mouth once for 1 dose

## 2025-06-06 NOTE — ASSESSMENT & PLAN NOTE
- Recent constipation lasting >10 days with incomplete relief.  - Previous use of MiraLAX, senna, suppositories, and enemas without full relief.  - No diarrhea or blood in stool.  - Endoscopy and colonoscopy to rule out underlying causes.  Orders:    polyethylene glycol (GOLYTELY) 4000 mL solution; Take 4,000 mL by mouth once for 1 dose

## 2025-06-06 NOTE — PROGRESS NOTES
Patient tolerated Venofer infusion without complications. AVS declined. Next appt confirmed for June 20, at 11:00am. Left unit ambulatory with a steady gait.

## 2025-06-06 NOTE — PATIENT INSTRUCTIONS
Scheduled date of colonoscopy/EGD (as of today): 07/31/2025  Physician performing colonoscopy: Dr. Dockery   Location of colonoscopy: [facility]  Bowel prep reviewed with patient: Golytely   Instructions reviewed with patient by: Lisa   Clearances:  N/A

## 2025-06-06 NOTE — ASSESSMENT & PLAN NOTE
She has menorrhagia and this is probably the cause of her iron deficiency anemia.  She will continue to follow-up with her gynecologist for further evaluation and management.

## 2025-06-13 NOTE — ASSESSMENT & PLAN NOTE
Patient has constipation in addition to other GI concerns such of active blood loss especially in light of use of IUD with menstruation.  Recommendation is to continue to follow-up with etiology investigation.  Orders:    Ambulatory referral to Gastroenterology; Future

## 2025-06-18 DIAGNOSIS — D50.9 IRON DEFICIENCY ANEMIA, UNSPECIFIED IRON DEFICIENCY ANEMIA TYPE: Primary | ICD-10-CM

## 2025-06-18 RX ORDER — SODIUM CHLORIDE 9 MG/ML
20 INJECTION, SOLUTION INTRAVENOUS ONCE
Status: CANCELLED | OUTPATIENT
Start: 2025-06-20

## 2025-06-20 ENCOUNTER — HOSPITAL ENCOUNTER (OUTPATIENT)
Dept: INFUSION CENTER | Facility: HOSPITAL | Age: 30
End: 2025-06-20
Attending: INTERNAL MEDICINE
Payer: COMMERCIAL

## 2025-06-20 VITALS
TEMPERATURE: 97.6 F | HEART RATE: 57 BPM | SYSTOLIC BLOOD PRESSURE: 128 MMHG | DIASTOLIC BLOOD PRESSURE: 71 MMHG | RESPIRATION RATE: 18 BRPM

## 2025-06-20 DIAGNOSIS — D50.9 IRON DEFICIENCY ANEMIA, UNSPECIFIED IRON DEFICIENCY ANEMIA TYPE: Primary | ICD-10-CM

## 2025-06-20 PROCEDURE — 96365 THER/PROPH/DIAG IV INF INIT: CPT

## 2025-06-20 RX ORDER — SODIUM CHLORIDE 9 MG/ML
20 INJECTION, SOLUTION INTRAVENOUS ONCE
Status: COMPLETED | OUTPATIENT
Start: 2025-06-20 | End: 2025-06-20

## 2025-06-20 RX ORDER — SODIUM CHLORIDE 9 MG/ML
20 INJECTION, SOLUTION INTRAVENOUS ONCE
Status: CANCELLED | OUTPATIENT
Start: 2025-06-27

## 2025-06-20 RX ADMIN — SODIUM CHLORIDE 20 ML/HR: 9 INJECTION, SOLUTION INTRAVENOUS at 11:54

## 2025-06-20 RX ADMIN — IRON SUCROSE 300 MG: 20 INJECTION, SOLUTION INTRAVENOUS at 11:59

## 2025-06-20 NOTE — PROGRESS NOTES
Patient tolerated Venofer infusion without complications. AVS declined. Next appt confirmed for June 26, at 2:30pm. Left unit ambulatory with a steady gait.

## 2025-07-02 DIAGNOSIS — D50.9 IRON DEFICIENCY ANEMIA, UNSPECIFIED IRON DEFICIENCY ANEMIA TYPE: Primary | ICD-10-CM

## 2025-07-02 RX ORDER — SODIUM CHLORIDE 9 MG/ML
20 INJECTION, SOLUTION INTRAVENOUS ONCE
Status: CANCELLED | OUTPATIENT
Start: 2025-07-07

## 2025-07-07 ENCOUNTER — HOSPITAL ENCOUNTER (OUTPATIENT)
Dept: INFUSION CENTER | Facility: HOSPITAL | Age: 30
Discharge: HOME | End: 2025-07-07
Attending: INTERNAL MEDICINE
Payer: COMMERCIAL

## 2025-07-07 VITALS
SYSTOLIC BLOOD PRESSURE: 113 MMHG | HEART RATE: 60 BPM | DIASTOLIC BLOOD PRESSURE: 67 MMHG | TEMPERATURE: 97.1 F | RESPIRATION RATE: 18 BRPM

## 2025-07-07 DIAGNOSIS — D50.9 IRON DEFICIENCY ANEMIA, UNSPECIFIED IRON DEFICIENCY ANEMIA TYPE: Primary | ICD-10-CM

## 2025-07-07 PROCEDURE — 96365 THER/PROPH/DIAG IV INF INIT: CPT

## 2025-07-07 RX ORDER — SODIUM CHLORIDE 9 MG/ML
20 INJECTION, SOLUTION INTRAVENOUS ONCE
Status: CANCELLED | OUTPATIENT
Start: 2025-07-14

## 2025-07-07 RX ORDER — SODIUM CHLORIDE 9 MG/ML
20 INJECTION, SOLUTION INTRAVENOUS ONCE
Status: COMPLETED | OUTPATIENT
Start: 2025-07-07 | End: 2025-07-07

## 2025-07-07 RX ADMIN — IRON SUCROSE 300 MG: 20 INJECTION, SOLUTION INTRAVENOUS at 13:55

## 2025-07-07 RX ADMIN — SODIUM CHLORIDE 20 ML/HR: 0.9 INJECTION, SOLUTION INTRAVENOUS at 13:50

## 2025-07-07 NOTE — PROGRESS NOTES
Pt tolerated venofer infusion without difficulty.  Confirmed next appt on 7/14 at 1100.  AVS declined.  Left ambulatory in stable condition.

## 2025-07-14 ENCOUNTER — HOSPITAL ENCOUNTER (OUTPATIENT)
Dept: INFUSION CENTER | Facility: HOSPITAL | Age: 30
Discharge: HOME | End: 2025-07-14
Attending: INTERNAL MEDICINE
Payer: COMMERCIAL

## 2025-07-14 VITALS
TEMPERATURE: 97.7 F | HEART RATE: 89 BPM | RESPIRATION RATE: 18 BRPM | DIASTOLIC BLOOD PRESSURE: 74 MMHG | SYSTOLIC BLOOD PRESSURE: 110 MMHG

## 2025-07-14 DIAGNOSIS — D50.9 IRON DEFICIENCY ANEMIA, UNSPECIFIED IRON DEFICIENCY ANEMIA TYPE: Primary | ICD-10-CM

## 2025-07-14 PROCEDURE — 96366 THER/PROPH/DIAG IV INF ADDON: CPT

## 2025-07-14 PROCEDURE — 96365 THER/PROPH/DIAG IV INF INIT: CPT

## 2025-07-14 RX ORDER — SODIUM CHLORIDE 9 MG/ML
20 INJECTION, SOLUTION INTRAVENOUS ONCE
OUTPATIENT
Start: 2025-07-21

## 2025-07-14 RX ORDER — SODIUM CHLORIDE 9 MG/ML
20 INJECTION, SOLUTION INTRAVENOUS ONCE
Status: COMPLETED | OUTPATIENT
Start: 2025-07-14 | End: 2025-07-14

## 2025-07-14 RX ADMIN — IRON SUCROSE 300 MG: 20 INJECTION, SOLUTION INTRAVENOUS at 11:20

## 2025-07-14 RX ADMIN — SODIUM CHLORIDE 20 ML/HR: 0.9 INJECTION, SOLUTION INTRAVENOUS at 11:20

## 2025-07-14 NOTE — PROGRESS NOTES
Venofer infused without incident, pt aware of next apt, AVS declined, left ambulatory with a steady gait.

## 2025-07-31 ENCOUNTER — HOSPITAL ENCOUNTER (OUTPATIENT)
Dept: GASTROENTEROLOGY | Facility: HOSPITAL | Age: 30
Setting detail: OUTPATIENT SURGERY
Discharge: HOME | End: 2025-07-31
Attending: INTERNAL MEDICINE | Admitting: INTERNAL MEDICINE
Payer: COMMERCIAL

## 2025-07-31 ENCOUNTER — ANESTHESIA EVENT (OUTPATIENT)
Dept: GASTROENTEROLOGY | Facility: HOSPITAL | Age: 30
End: 2025-07-31
Payer: COMMERCIAL

## 2025-07-31 ENCOUNTER — ANESTHESIA (OUTPATIENT)
Dept: GASTROENTEROLOGY | Facility: HOSPITAL | Age: 30
End: 2025-07-31
Payer: COMMERCIAL

## 2025-07-31 VITALS
BODY MASS INDEX: 23.54 KG/M2 | HEART RATE: 65 BPM | DIASTOLIC BLOOD PRESSURE: 71 MMHG | OXYGEN SATURATION: 99 % | SYSTOLIC BLOOD PRESSURE: 143 MMHG | HEIGHT: 67 IN | WEIGHT: 150 LBS | TEMPERATURE: 97.9 F | RESPIRATION RATE: 18 BRPM

## 2025-07-31 DIAGNOSIS — D50.0 IRON DEFICIENCY ANEMIA DUE TO CHRONIC BLOOD LOSS: ICD-10-CM

## 2025-07-31 LAB
EXT PREGNANCY TEST URINE: NEGATIVE
EXT. CONTROL: NORMAL

## 2025-07-31 PROCEDURE — 81025 URINE PREGNANCY TEST: CPT | Performed by: ANESTHESIOLOGY

## 2025-07-31 PROCEDURE — 45378 DIAGNOSTIC COLONOSCOPY: CPT | Performed by: INTERNAL MEDICINE

## 2025-07-31 PROCEDURE — 88305 TISSUE EXAM BY PATHOLOGIST: CPT | Performed by: PATHOLOGY

## 2025-07-31 PROCEDURE — 43239 EGD BIOPSY SINGLE/MULTIPLE: CPT | Performed by: INTERNAL MEDICINE

## 2025-07-31 RX ORDER — LIDOCAINE HYDROCHLORIDE 10 MG/ML
INJECTION, SOLUTION EPIDURAL; INFILTRATION; INTRACAUDAL; PERINEURAL AS NEEDED
Status: DISCONTINUED | OUTPATIENT
Start: 2025-07-31 | End: 2025-07-31

## 2025-07-31 RX ORDER — PROPOFOL 10 MG/ML
INJECTION, EMULSION INTRAVENOUS AS NEEDED
Status: DISCONTINUED | OUTPATIENT
Start: 2025-07-31 | End: 2025-07-31

## 2025-07-31 RX ORDER — SODIUM CHLORIDE 9 MG/ML
INJECTION, SOLUTION INTRAVENOUS CONTINUOUS PRN
Status: DISCONTINUED | OUTPATIENT
Start: 2025-07-31 | End: 2025-07-31

## 2025-07-31 RX ADMIN — Medication 40 MG: at 12:20

## 2025-07-31 RX ADMIN — SODIUM CHLORIDE: 0.9 INJECTION, SOLUTION INTRAVENOUS at 11:31

## 2025-07-31 RX ADMIN — PROPOFOL 150 MG: 10 INJECTION, EMULSION INTRAVENOUS at 11:52

## 2025-07-31 RX ADMIN — PROPOFOL 40 MG: 10 INJECTION, EMULSION INTRAVENOUS at 12:04

## 2025-07-31 RX ADMIN — PROPOFOL 120 MCG/KG/MIN: 10 INJECTION, EMULSION INTRAVENOUS at 11:53

## 2025-07-31 RX ADMIN — PROPOFOL 40 MG: 10 INJECTION, EMULSION INTRAVENOUS at 11:58

## 2025-07-31 RX ADMIN — LIDOCAINE HYDROCHLORIDE 50 MG: 10 INJECTION, SOLUTION EPIDURAL; INFILTRATION; INTRACAUDAL; PERINEURAL at 11:52

## 2025-08-05 PROCEDURE — 88305 TISSUE EXAM BY PATHOLOGIST: CPT | Performed by: PATHOLOGY

## (undated) DEVICE — PREMIUM DRY TRAY LF: Brand: MEDLINE INDUSTRIES, INC.

## (undated) DEVICE — BETHLEHEM UNIVERSAL MINOR VAG: Brand: CARDINAL HEALTH

## (undated) DEVICE — 2000CC GUARDIAN II: Brand: GUARDIAN

## (undated) DEVICE — GLOVE PI ULTRA TOUCH SZ.7.0

## (undated) DEVICE — DRAPE EQUIPMENT RF WAND

## (undated) DEVICE — PVC URETHRAL CATHETER: Brand: DOVER

## (undated) DEVICE — ENDOMETRIAL BX PIPELLE

## (undated) DEVICE — UNDER BUTTOCKS DRAPE W/FLUID CONTROL POUCH: Brand: CONVERTORS

## (undated) DEVICE — LIGHT GLOVE GREEN

## (undated) DEVICE — GLOVE PI ULTRA TOUCH SZ 6

## (undated) DEVICE — STRL ALLENTOWN HYSTEROSCOPY PK: Brand: CARDINAL HEALTH

## (undated) DEVICE — CYSTO TUBING SINGLE IRRIGATION

## (undated) DEVICE — GLOVE INDICATOR PI UNDERGLOVE SZ 7 BLUE

## (undated) DEVICE — IV EXTENSION TUBING 33 IN

## (undated) DEVICE — SCD SEQUENTIAL COMPRESSION COMFORT SLEEVE MEDIUM KNEE LENGTH: Brand: KENDALL SCD

## (undated) DEVICE — GLOVE INDICATOR PI UNDERGLOVE SZ 7.5 BLUE

## (undated) DEVICE — GLOVE INDICATOR PI UNDERGLOVE SZ 6.5 BLUE